# Patient Record
Sex: FEMALE | Race: WHITE | NOT HISPANIC OR LATINO | Employment: OTHER | URBAN - METROPOLITAN AREA
[De-identification: names, ages, dates, MRNs, and addresses within clinical notes are randomized per-mention and may not be internally consistent; named-entity substitution may affect disease eponyms.]

---

## 2017-12-19 PROBLEM — N93.8 OTHER SPECIFIED ABNORMAL UTERINE AND VAGINAL BLEEDING: Status: ACTIVE | Noted: 2017-12-19

## 2018-07-03 PROBLEM — I10 ESSENTIAL HYPERTENSION: Status: ACTIVE | Noted: 2018-07-03

## 2021-03-29 PROBLEM — J45.30 MILD PERSISTENT ASTHMA WITHOUT COMPLICATION: Chronic | Status: ACTIVE | Noted: 2021-03-29

## 2021-03-29 PROBLEM — E11.9 DIABETES MELLITUS (HCC): Chronic | Status: ACTIVE | Noted: 2021-03-29

## 2021-03-29 PROBLEM — F32.A DEPRESSION: Chronic | Status: ACTIVE | Noted: 2021-03-29

## 2021-03-29 PROBLEM — G45.9 TIA DUE TO EMBOLISM (HCC): Status: ACTIVE | Noted: 2021-03-29

## 2021-11-16 PROBLEM — E11.9 DIABETES MELLITUS (HCC): Chronic | Status: RESOLVED | Noted: 2021-03-29 | Resolved: 2021-11-16

## 2022-09-21 ENCOUNTER — OFFICE VISIT (OUTPATIENT)
Dept: URGENT CARE | Facility: CLINIC | Age: 52
End: 2022-09-21
Payer: COMMERCIAL

## 2022-09-21 ENCOUNTER — TELEPHONE (OUTPATIENT)
Dept: FAMILY MEDICINE CLINIC | Facility: CLINIC | Age: 52
End: 2022-09-21

## 2022-09-21 ENCOUNTER — HOSPITAL ENCOUNTER (EMERGENCY)
Facility: HOSPITAL | Age: 52
Discharge: HOME | End: 2022-09-21
Attending: EMERGENCY MEDICINE
Payer: COMMERCIAL

## 2022-09-21 ENCOUNTER — APPOINTMENT (EMERGENCY)
Dept: CT IMAGING | Facility: HOSPITAL | Age: 52
End: 2022-09-21
Payer: COMMERCIAL

## 2022-09-21 VITALS
OXYGEN SATURATION: 99 % | WEIGHT: 293 LBS | RESPIRATION RATE: 18 BRPM | SYSTOLIC BLOOD PRESSURE: 123 MMHG | TEMPERATURE: 97.8 F | BODY MASS INDEX: 47.09 KG/M2 | HEART RATE: 97 BPM | HEIGHT: 66 IN | DIASTOLIC BLOOD PRESSURE: 62 MMHG

## 2022-09-21 VITALS
HEART RATE: 83 BPM | TEMPERATURE: 98.6 F | RESPIRATION RATE: 18 BRPM | DIASTOLIC BLOOD PRESSURE: 80 MMHG | OXYGEN SATURATION: 99 % | SYSTOLIC BLOOD PRESSURE: 146 MMHG

## 2022-09-21 DIAGNOSIS — R11.0 NAUSEA: Primary | ICD-10-CM

## 2022-09-21 DIAGNOSIS — R19.7 DIARRHEA, UNSPECIFIED TYPE: ICD-10-CM

## 2022-09-21 DIAGNOSIS — R19.7 DIARRHEA: Primary | ICD-10-CM

## 2022-09-21 DIAGNOSIS — K21.9 GASTROESOPHAGEAL REFLUX DISEASE WITHOUT ESOPHAGITIS: Chronic | ICD-10-CM

## 2022-09-21 DIAGNOSIS — R14.2 BURPING: ICD-10-CM

## 2022-09-21 LAB
ALBUMIN SERPL BCP-MCNC: 4.2 G/DL (ref 3.5–5)
ALP SERPL-CCNC: 63 U/L (ref 34–104)
ALT SERPL W P-5'-P-CCNC: 11 U/L (ref 7–52)
ANION GAP SERPL CALCULATED.3IONS-SCNC: 9 MMOL/L (ref 4–13)
AST SERPL W P-5'-P-CCNC: 14 U/L (ref 13–39)
BASOPHILS # BLD AUTO: 0.01 THOUSANDS/ΜL (ref 0–0.1)
BASOPHILS NFR BLD AUTO: 0 % (ref 0–1)
BILIRUB SERPL-MCNC: 0.53 MG/DL (ref 0.2–1)
BUN SERPL-MCNC: 10 MG/DL (ref 5–25)
CALCIUM SERPL-MCNC: 9.1 MG/DL (ref 8.4–10.2)
CHLORIDE SERPL-SCNC: 105 MMOL/L (ref 96–108)
CO2 SERPL-SCNC: 23 MMOL/L (ref 21–32)
CREAT SERPL-MCNC: 1.09 MG/DL (ref 0.6–1.3)
EOSINOPHIL # BLD AUTO: 0.36 THOUSAND/ΜL (ref 0–0.61)
EOSINOPHIL NFR BLD AUTO: 6 % (ref 0–6)
ERYTHROCYTE [DISTWIDTH] IN BLOOD BY AUTOMATED COUNT: 13.1 % (ref 11.6–15.1)
GFR SERPL CREATININE-BSD FRML MDRD: 59 ML/MIN/1.73SQ M
GLUCOSE SERPL-MCNC: 114 MG/DL (ref 65–140)
HCT VFR BLD AUTO: 49.3 % (ref 34.8–46.1)
HGB BLD-MCNC: 15.6 G/DL (ref 11.5–15.4)
IMM GRANULOCYTES # BLD AUTO: 0.01 THOUSAND/UL (ref 0–0.2)
IMM GRANULOCYTES NFR BLD AUTO: 0 % (ref 0–2)
LIPASE SERPL-CCNC: 18 U/L (ref 11–82)
LYMPHOCYTES # BLD AUTO: 0.9 THOUSANDS/ΜL (ref 0.6–4.47)
LYMPHOCYTES NFR BLD AUTO: 16 % (ref 14–44)
MCH RBC QN AUTO: 28.5 PG (ref 26.8–34.3)
MCHC RBC AUTO-ENTMCNC: 31.6 G/DL (ref 31.4–37.4)
MCV RBC AUTO: 90 FL (ref 82–98)
MONOCYTES # BLD AUTO: 0.76 THOUSAND/ΜL (ref 0.17–1.22)
MONOCYTES NFR BLD AUTO: 13 % (ref 4–12)
NEUTROPHILS # BLD AUTO: 3.68 THOUSANDS/ΜL (ref 1.85–7.62)
NEUTS SEG NFR BLD AUTO: 65 % (ref 43–75)
NRBC BLD AUTO-RTO: 0 /100 WBCS
PLATELET # BLD AUTO: 161 THOUSANDS/UL (ref 149–390)
PMV BLD AUTO: 9.6 FL (ref 8.9–12.7)
POTASSIUM SERPL-SCNC: 3.7 MMOL/L (ref 3.5–5.3)
PROT SERPL-MCNC: 7.3 G/DL (ref 6.4–8.4)
RBC # BLD AUTO: 5.48 MILLION/UL (ref 3.81–5.12)
SODIUM SERPL-SCNC: 137 MMOL/L (ref 135–147)
WBC # BLD AUTO: 5.72 THOUSAND/UL (ref 4.31–10.16)

## 2022-09-21 PROCEDURE — 99284 EMERGENCY DEPT VISIT MOD MDM: CPT

## 2022-09-21 PROCEDURE — 83690 ASSAY OF LIPASE: CPT

## 2022-09-21 PROCEDURE — S9088 SERVICES PROVIDED IN URGENT: HCPCS | Performed by: PHYSICIAN ASSISTANT

## 2022-09-21 PROCEDURE — 85025 COMPLETE CBC W/AUTO DIFF WBC: CPT

## 2022-09-21 PROCEDURE — 80053 COMPREHEN METABOLIC PANEL: CPT

## 2022-09-21 PROCEDURE — 36415 COLL VENOUS BLD VENIPUNCTURE: CPT

## 2022-09-21 PROCEDURE — 99212 OFFICE O/P EST SF 10 MIN: CPT | Performed by: PHYSICIAN ASSISTANT

## 2022-09-21 PROCEDURE — 74177 CT ABD & PELVIS W/CONTRAST: CPT

## 2022-09-21 PROCEDURE — 96374 THER/PROPH/DIAG INJ IV PUSH: CPT

## 2022-09-21 RX ORDER — ONDANSETRON 4 MG/1
4 TABLET, ORALLY DISINTEGRATING ORAL EVERY 6 HOURS PRN
Qty: 20 TABLET | Refills: 0 | Status: SHIPPED | OUTPATIENT
Start: 2022-09-21

## 2022-09-21 RX ORDER — ONDANSETRON 2 MG/ML
4 INJECTION INTRAMUSCULAR; INTRAVENOUS ONCE
Status: COMPLETED | OUTPATIENT
Start: 2022-09-21 | End: 2022-09-21

## 2022-09-21 RX ADMIN — IOHEXOL 100 ML: 350 INJECTION, SOLUTION INTRAVENOUS at 20:14

## 2022-09-21 RX ADMIN — SODIUM CHLORIDE 1000 ML: 0.9 INJECTION, SOLUTION INTRAVENOUS at 19:45

## 2022-09-21 RX ADMIN — ONDANSETRON 4 MG: 2 INJECTION INTRAMUSCULAR; INTRAVENOUS at 19:25
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
## 2022-09-22 DIAGNOSIS — E11.65 TYPE 2 DIABETES MELLITUS WITH HYPERGLYCEMIA, WITH LONG-TERM CURRENT USE OF INSULIN (HCC): ICD-10-CM

## 2022-09-22 DIAGNOSIS — Z79.4 TYPE 2 DIABETES MELLITUS WITH HYPERGLYCEMIA, WITH LONG-TERM CURRENT USE OF INSULIN (HCC): ICD-10-CM

## 2022-09-22 RX ORDER — INSULIN PUMP CONTROLLER
1 EACH MISCELLANEOUS
Qty: 45 EACH | Refills: 1 | Status: SHIPPED | OUTPATIENT
Start: 2022-09-22

## 2022-10-07 DIAGNOSIS — Z79.4 TYPE 2 DIABETES MELLITUS WITH HYPERGLYCEMIA, WITH LONG-TERM CURRENT USE OF INSULIN (HCC): ICD-10-CM

## 2022-10-07 DIAGNOSIS — E11.65 TYPE 2 DIABETES MELLITUS WITH HYPERGLYCEMIA, WITH LONG-TERM CURRENT USE OF INSULIN (HCC): ICD-10-CM

## 2022-10-07 RX ORDER — EMPAGLIFLOZIN 25 MG/1
TABLET, FILM COATED ORAL
Qty: 30 TABLET | Refills: 2 | Status: SHIPPED | OUTPATIENT
Start: 2022-10-07

## 2022-10-11 NOTE — TELEPHONE ENCOUNTER
Called and left message with patient requesting a call back 
Jean Carlos Pop called and stated she has had Diarrhea for the past 4 days and not stopping  She is eating, but using the Brat Diet  She is burping a lot also  She is having a hard time keeping up her sugar and is increasing her intake of insulin    She is not taking OTC diarrhea medication    Please advise    Phone: [phone]
Patient called back was going to go over to Urgent care
Please call patient a verify what dose of insulin she has been taking  She should not be taking more insulin if she cannot keep her sugars up  Has she been hypoglycemic? What are her sugars? How many episodes of diarrhea per day?
She should continue to monitor sugars and hold insulin when low  Continues to increase fluids  Can take imodium as needed  Sounds like she might have gastroenteritis  Call the office if no improvement 
Spoke with Jorge Baig ( miss understood question on the first message about her insulin)  and she stated that she decreased the insulin  She is on a pump and just suspenses pump for a while  Has diarrhea to many time to count  Every thing she is eating is coming back out    Her sugers are running 70 to 150   And when the one time it was running low (60) is when she started to suspend the insulin    No belly pain, and constant burping   Drinking water so she is not dehydrated
Alcohol dependence with withdrawal

## 2022-10-12 ENCOUNTER — TELEPHONE (OUTPATIENT)
Dept: ENDOCRINOLOGY | Facility: CLINIC | Age: 52
End: 2022-10-12

## 2022-10-12 NOTE — TELEPHONE ENCOUNTER
Faxed over to [facility] patients Jacky BENITO paperwork to [phone]

## 2022-10-26 DIAGNOSIS — J45.30 MILD PERSISTENT ASTHMA WITHOUT COMPLICATION: Chronic | ICD-10-CM

## 2022-10-26 RX ORDER — ALBUTEROL SULFATE 90 UG/1
AEROSOL, METERED RESPIRATORY (INHALATION)
Qty: 18 G | Refills: 2 | Status: SHIPPED | OUTPATIENT
Start: 2022-10-26

## 2022-11-07 ENCOUNTER — TELEPHONE (OUTPATIENT)
Dept: ENDOCRINOLOGY | Facility: CLINIC | Age: 52
End: 2022-11-07

## 2022-11-07 DIAGNOSIS — E11.65 TYPE 2 DIABETES MELLITUS WITH HYPERGLYCEMIA, WITH LONG-TERM CURRENT USE OF INSULIN (HCC): Primary | ICD-10-CM

## 2022-11-07 DIAGNOSIS — Z79.4 TYPE 2 DIABETES MELLITUS WITH HYPERGLYCEMIA, WITH LONG-TERM CURRENT USE OF INSULIN (HCC): Primary | ICD-10-CM

## 2022-11-07 RX ORDER — BLOOD SUGAR DIAGNOSTIC
STRIP MISCELLANEOUS
Qty: 120 STRIP | Refills: 3 | Status: SHIPPED | OUTPATIENT
Start: 2022-11-07

## 2022-11-28 DIAGNOSIS — Z79.4 TYPE 2 DIABETES MELLITUS WITHOUT COMPLICATION, WITH LONG-TERM CURRENT USE OF INSULIN (HCC): ICD-10-CM

## 2022-11-28 DIAGNOSIS — E11.9 TYPE 2 DIABETES MELLITUS WITHOUT COMPLICATION, WITH LONG-TERM CURRENT USE OF INSULIN (HCC): ICD-10-CM

## 2022-11-28 DIAGNOSIS — E66.01 MORBID OBESITY (HCC): ICD-10-CM

## 2022-11-28 RX ORDER — SEMAGLUTIDE 1.34 MG/ML
INJECTION, SOLUTION SUBCUTANEOUS
Qty: 9 ML | Refills: 1 | Status: SHIPPED | OUTPATIENT
Start: 2022-11-28 | End: 2022-11-29 | Stop reason: SDUPTHER

## 2022-11-28 NOTE — TELEPHONE ENCOUNTER
Patient stated she went to  [facility] where they only gave her one pen instead of 3  Pharmacy is going to look back at the cameras and determine if they only gave her one pen or not

## 2022-11-29 DIAGNOSIS — E66.01 MORBID OBESITY (HCC): ICD-10-CM

## 2022-11-29 DIAGNOSIS — Z79.4 TYPE 2 DIABETES MELLITUS WITHOUT COMPLICATION, WITH LONG-TERM CURRENT USE OF INSULIN (HCC): ICD-10-CM

## 2022-11-29 DIAGNOSIS — E11.9 TYPE 2 DIABETES MELLITUS WITHOUT COMPLICATION, WITH LONG-TERM CURRENT USE OF INSULIN (HCC): ICD-10-CM

## 2022-11-29 RX ORDER — SEMAGLUTIDE 1.34 MG/ML
INJECTION, SOLUTION SUBCUTANEOUS
Qty: 9 ML | Refills: 1 | Status: SHIPPED | OUTPATIENT
Start: 2022-11-29

## 2022-11-29 NOTE — TELEPHONE ENCOUNTER
[facility] did not have patients ozempic in stock patient requested to have records transferred to [facility] from now on

## 2022-12-07 ENCOUNTER — HOSPITAL ENCOUNTER (OUTPATIENT)
Dept: MAMMOGRAPHY | Facility: HOSPITAL | Age: 52
Discharge: HOME | End: 2022-12-07

## 2022-12-07 VITALS — WEIGHT: 293 LBS | HEIGHT: 66 IN | BODY MASS INDEX: 47.09 KG/M2

## 2022-12-07 DIAGNOSIS — Z12.31 ENCOUNTER FOR SCREENING MAMMOGRAM FOR BREAST CANCER: ICD-10-CM

## 2022-12-07 DIAGNOSIS — Z01.419 ROUTINE GYNECOLOGICAL EXAMINATION: ICD-10-CM

## 2022-12-15 DIAGNOSIS — R10.10 PAIN OF UPPER ABDOMEN: ICD-10-CM

## 2022-12-15 RX ORDER — OMEPRAZOLE 40 MG/1
40 CAPSULE, DELAYED RELEASE ORAL DAILY
Qty: 30 CAPSULE | Refills: 1 | Status: SHIPPED | OUTPATIENT
Start: 2022-12-15

## 2022-12-15 NOTE — TELEPHONE ENCOUNTER
Patient requesting refill(s) of: Prilosec    Last filled: 10/21/22 #30x1  Last appt: 6/22/22  Next appt: none  Pharmacy: [facility]

## 2022-12-21 ENCOUNTER — APPOINTMENT (OUTPATIENT)
Dept: LAB | Facility: CLINIC | Age: 52
End: 2022-12-21

## 2022-12-21 ENCOUNTER — OFFICE VISIT (OUTPATIENT)
Dept: FAMILY MEDICINE CLINIC | Facility: CLINIC | Age: 52
End: 2022-12-21

## 2022-12-21 VITALS
SYSTOLIC BLOOD PRESSURE: 102 MMHG | DIASTOLIC BLOOD PRESSURE: 62 MMHG | TEMPERATURE: 96.9 F | BODY MASS INDEX: 47.09 KG/M2 | HEART RATE: 82 BPM | HEIGHT: 66 IN | RESPIRATION RATE: 18 BRPM | WEIGHT: 293 LBS | OXYGEN SATURATION: 99 %

## 2022-12-21 DIAGNOSIS — G47.09 OTHER INSOMNIA: Primary | ICD-10-CM

## 2022-12-21 DIAGNOSIS — F41.1 GAD (GENERALIZED ANXIETY DISORDER): ICD-10-CM

## 2022-12-21 DIAGNOSIS — Z79.4 TYPE 2 DIABETES MELLITUS WITH HYPERGLYCEMIA, WITH LONG-TERM CURRENT USE OF INSULIN (HCC): ICD-10-CM

## 2022-12-21 DIAGNOSIS — E11.65 TYPE 2 DIABETES MELLITUS WITH HYPERGLYCEMIA, WITH LONG-TERM CURRENT USE OF INSULIN (HCC): ICD-10-CM

## 2022-12-21 LAB
ALBUMIN SERPL BCP-MCNC: 3.6 G/DL (ref 3.5–5)
ALP SERPL-CCNC: 64 U/L (ref 46–116)
ALT SERPL W P-5'-P-CCNC: 18 U/L (ref 12–78)
ANION GAP SERPL CALCULATED.3IONS-SCNC: 3 MMOL/L (ref 4–13)
AST SERPL W P-5'-P-CCNC: 14 U/L (ref 5–45)
BILIRUB SERPL-MCNC: 0.69 MG/DL (ref 0.2–1)
BUN SERPL-MCNC: 18 MG/DL (ref 5–25)
CALCIUM SERPL-MCNC: 9.2 MG/DL (ref 8.3–10.1)
CHLORIDE SERPL-SCNC: 110 MMOL/L (ref 96–108)
CO2 SERPL-SCNC: 26 MMOL/L (ref 21–32)
CREAT SERPL-MCNC: 0.9 MG/DL (ref 0.6–1.3)
EST. AVERAGE GLUCOSE BLD GHB EST-MCNC: 126 MG/DL
GFR SERPL CREATININE-BSD FRML MDRD: 74 ML/MIN/1.73SQ M
GLUCOSE P FAST SERPL-MCNC: 106 MG/DL (ref 65–99)
HBA1C MFR BLD: 6 %
POTASSIUM SERPL-SCNC: 3.9 MMOL/L (ref 3.5–5.3)
PROT SERPL-MCNC: 7.2 G/DL (ref 6.4–8.4)
SODIUM SERPL-SCNC: 139 MMOL/L (ref 135–147)

## 2022-12-21 RX ORDER — BUSPIRONE HYDROCHLORIDE 5 MG/1
5 TABLET ORAL 3 TIMES DAILY
Qty: 90 TABLET | Refills: 1 | Status: SHIPPED | OUTPATIENT
Start: 2022-12-21

## 2022-12-21 RX ORDER — TRAZODONE HYDROCHLORIDE 50 MG/1
50 TABLET ORAL
Qty: 30 TABLET | Refills: 1 | Status: SHIPPED | OUTPATIENT
Start: 2022-12-21

## 2022-12-21 RX ORDER — DIPHENOXYLATE HYDROCHLORIDE AND ATROPINE SULFATE 2.5; .025 MG/1; MG/1
1 TABLET ORAL DAILY
COMMUNITY

## 2022-12-21 NOTE — PATIENT INSTRUCTIONS
Tips for Healthy Sleep   AMBULATORY CARE:   What you need to know about healthy sleep:  Healthy sleep, or sleep hygiene, is important to your physical, mental, and emotional health  Sleep affects almost every part of your body, including your brain, heart, metabolism, immune system, and mood  Good sleep habits can help you fall asleep and stay asleep during the night  Poor quality sleep or a long-term lack of sleep increases your risk for certain disorders  These include high blood pressure, cardiovascular disease, obesity, depression, and diabetes  What you need to know about sleep stages: The 2 main kinds of sleep are rapid eye movement (REM) and non-REM  You cycle through REM and non-REM many times during the night  Stage 1 non-REM sleep  is when you first fall asleep  This stage is short  Your brain waves slow and your body relaxes  Stage 2 non-REM sleep  is a period of light sleep before you move into deeper sleep  You spend the most time in this stage during the night  Your body temperature drops and your body relaxes even more  Stage 3 non-REM sleep  is a period of deep sleep that starts after stage 2  This stage is needed to feel refreshed in the morning  REM sleep  starts about 90 minutes after you fall asleep  Your eyes move from side to side  Your heart rate, blood pressure, and breathing become faster  Most of your dreams occur during REM sleep  As you age, you spend less time in REM sleep  How much sleep you need:  Each person needs a different amount of sleep  Your sleep patterns and needs change as you age  In general, school-aged children and teenagers need about 9 to 10 hours of sleep a night  Most adults need about 7 to 9 hours of sleep a night  After age 61 years, sleep may be lighter and for less time, with more periods of wakefulness  Older adults may fall asleep and wake up earlier than they did at a younger age   Medicines or conditions, such as chronic pain, may keep older adults awake  How to know you are getting healthy sleep:   Keep a 2-week log of your sleep  Write down what time you got up, what you did that day, and anything else that could affect your sleep  Keep a record of your sleep patterns, and any sleeping problems you have  Bring the record to your follow-up visits  Ask someone who lives with you if they notice anything about your sleep  For example, maybe you snore loudly or stop breathing for short periods  Tell your healthcare provider if your legs twitch or you feel like you can't keep them still  Your healthcare provider may refer to you a sleep specialist or cognitive behavioral therapy  Call your doctor if:   Someone has told you that you stop breathing when you sleep  Your legs twitch and it keeps you from sleeping  You begin to use drugs or alcohol to fall asleep  You have questions or concerns about your sleep habits  How to improve your sleep:  Your daily routines influence your sleep  Nutrition, medicines, your schedule, and what you do in the evenings can affect your sleep  Do the following to help improve your sleep:  Create a sleep schedule  Go to sleep and wake up at the same time every day  Be consistent, even on weekends or when you travel  Do not go to bed unless you are sleepy  Set up a calming routine before bed  Soak in a warm bath, listen to relaxing music, or read a book  Turn off all electronics at least 30 minutes before bedtime  Try not to watch television or use any electronics in the bedroom  Limit naps to 20 or 30 minutes, earlier in the day  Naps could make it hard for you to fall asleep at bedtime  Keep your bedroom cool, quiet, and dark  Turn on white noise, such as a fan, to help you relax  Get up if you do not fall asleep within 20 minutes  Move to another room and do something relaxing until you become sleepy  Limit caffeine, alcohol, and food to earlier in the day    Only drink caffeine in the morning  Do not drink alcohol within 6 hours of bedtime  Do not eat a heavy meal right before you go to bed  Limit how much liquid you drink in the evenings and right before bed  If you are prescribed diuretics, or water pills, take them early in the day  Exercise regularly  Daily exercise may help you sleep better  Do not exercise within 3 hours of bedtime  Follow up with your doctor as directed:  Bring your sleep log with you  Write down your questions so you remember to ask them during your visits  © Copyright [employer] 2022 Information is for End User's use only and may not be sold, redistributed or otherwise used for commercial purposes  All illustrations and images included in CareNotes® are the copyrighted property of [employer]  or [facility] Bethea   The above information is an  only  It is not intended as medical advice for individual conditions or treatments  Talk to your doctor, nurse or pharmacist before following any medical regimen to see if it is safe and effective for you
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
## 2022-12-22 ENCOUNTER — TELEPHONE (OUTPATIENT)
Dept: ENDOCRINOLOGY | Facility: CLINIC | Age: 52
End: 2022-12-22

## 2022-12-22 ENCOUNTER — OFFICE VISIT (OUTPATIENT)
Dept: ENDOCRINOLOGY | Facility: CLINIC | Age: 52
End: 2022-12-22

## 2022-12-22 VITALS
HEIGHT: 66 IN | HEART RATE: 82 BPM | DIASTOLIC BLOOD PRESSURE: 68 MMHG | BODY MASS INDEX: 47.09 KG/M2 | SYSTOLIC BLOOD PRESSURE: 118 MMHG | WEIGHT: 293 LBS | OXYGEN SATURATION: 98 %

## 2022-12-22 DIAGNOSIS — Z79.4 TYPE 2 DIABETES MELLITUS WITH HYPERGLYCEMIA, WITH LONG-TERM CURRENT USE OF INSULIN (HCC): Primary | ICD-10-CM

## 2022-12-22 DIAGNOSIS — E66.01 MORBID OBESITY (HCC): ICD-10-CM

## 2022-12-22 DIAGNOSIS — E11.65 TYPE 2 DIABETES MELLITUS WITH HYPERGLYCEMIA, WITH LONG-TERM CURRENT USE OF INSULIN (HCC): ICD-10-CM

## 2022-12-22 DIAGNOSIS — G47.33 OSA (OBSTRUCTIVE SLEEP APNEA): ICD-10-CM

## 2022-12-22 DIAGNOSIS — Z79.4 TYPE 2 DIABETES MELLITUS WITH HYPERGLYCEMIA, WITH LONG-TERM CURRENT USE OF INSULIN (HCC): ICD-10-CM

## 2022-12-22 DIAGNOSIS — I10 ESSENTIAL HYPERTENSION: ICD-10-CM

## 2022-12-22 DIAGNOSIS — E11.65 TYPE 2 DIABETES MELLITUS WITH HYPERGLYCEMIA, WITH LONG-TERM CURRENT USE OF INSULIN (HCC): Primary | ICD-10-CM

## 2022-12-22 RX ORDER — INSULIN PUMP CONTROLLER
1 EACH MISCELLANEOUS
Qty: 45 EACH | Refills: 1 | Status: SHIPPED | OUTPATIENT
Start: 2022-12-22 | End: 2022-12-22 | Stop reason: SDUPTHER

## 2022-12-22 RX ORDER — INSULIN PUMP CONTROLLER
1 EACH MISCELLANEOUS
Qty: 45 EACH | Refills: 1 | Status: SHIPPED | OUTPATIENT
Start: 2022-12-22
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
## 2022-12-22 NOTE — TELEPHONE ENCOUNTER
Pt is unable to get the Gen 4 Omni Pods at [facility]  They only carry the 5 Gen 6  She would like to have the Gen 4's sent to the [address] [place] [address] for now until she finds out if she is getting the gen 5

## 2022-12-22 NOTE — ASSESSMENT & PLAN NOTE
Patient is very well controlled at this time  While I do not prefer patients to be on such a high percent of basal insulin, patient is doing well on current regimen and she will not bolus more frequently as she does not eat frequently  Counseled again on the higher risk of hypoglycemia given her inconsistent eating patterns  Also counseled on the importance of checking her blood sugar with a finger stick if her CGM alerts her to hypoglycemia but she does not trust the reading/is asymptomatic  Patient knows to notify me with persistent hyperglycemia or episodes of hypoglycemia  F/U in 4 months     Lab Results   Component Value Date    HGBA1C 6 0 (H) 12/21/2022

## 2023-01-04 ENCOUNTER — RA CDI HCC (OUTPATIENT)
Dept: OTHER | Facility: HOSPITAL | Age: 53
End: 2023-01-04

## 2023-01-04 NOTE — PROGRESS NOTES
[facility]  coding opportunities       Chart reviewed, no opportunity found:   [address]        Patients Insurance     Medicare Insurance: [employer]

## 2023-01-11 ENCOUNTER — TELEPHONE (OUTPATIENT)
Dept: FAMILY MEDICINE CLINIC | Facility: CLINIC | Age: 53
End: 2023-01-11

## 2023-01-20 ENCOUNTER — OFFICE VISIT (OUTPATIENT)
Dept: FAMILY MEDICINE CLINIC | Facility: CLINIC | Age: 53
End: 2023-01-20

## 2023-01-20 VITALS
DIASTOLIC BLOOD PRESSURE: 82 MMHG | WEIGHT: 293 LBS | BODY MASS INDEX: 47.09 KG/M2 | HEART RATE: 80 BPM | OXYGEN SATURATION: 97 % | SYSTOLIC BLOOD PRESSURE: 134 MMHG | TEMPERATURE: 98.7 F | HEIGHT: 66 IN

## 2023-01-20 DIAGNOSIS — Z12.11 SCREENING FOR COLON CANCER: Primary | ICD-10-CM

## 2023-01-20 DIAGNOSIS — F41.1 GAD (GENERALIZED ANXIETY DISORDER): ICD-10-CM

## 2023-01-20 DIAGNOSIS — Z00.00 MEDICARE ANNUAL WELLNESS VISIT, SUBSEQUENT: ICD-10-CM

## 2023-01-20 DIAGNOSIS — C69.42 MALIGNANT MELANOMA OF UVEA OF LEFT EYE (HCC): ICD-10-CM

## 2023-01-20 DIAGNOSIS — E66.01 MORBID OBESITY (HCC): ICD-10-CM

## 2023-01-20 DIAGNOSIS — F51.01 PRIMARY INSOMNIA: ICD-10-CM

## 2023-01-20 DIAGNOSIS — E11.65 TYPE 2 DIABETES MELLITUS WITH HYPERGLYCEMIA, WITH LONG-TERM CURRENT USE OF INSULIN (HCC): ICD-10-CM

## 2023-01-20 DIAGNOSIS — E11.9 ENCOUNTER FOR DIABETIC FOOT EXAM (HCC): ICD-10-CM

## 2023-01-20 DIAGNOSIS — Z79.4 TYPE 2 DIABETES MELLITUS WITH HYPERGLYCEMIA, WITH LONG-TERM CURRENT USE OF INSULIN (HCC): ICD-10-CM

## 2023-01-20 RX ORDER — DOXEPIN HYDROCHLORIDE 10 MG/1
10 CAPSULE ORAL
Qty: 30 CAPSULE | Refills: 1 | Status: SHIPPED | OUTPATIENT
Start: 2023-01-20

## 2023-01-20 RX ORDER — BUSPIRONE HYDROCHLORIDE 10 MG/1
10 TABLET ORAL 2 TIMES DAILY
Qty: 60 TABLET | Refills: 3 | Status: SHIPPED | OUTPATIENT
Start: 2023-01-20
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
## 2023-01-20 NOTE — PATIENT INSTRUCTIONS
Medicare Preventive Visit Patient Instructions  Thank you for completing your Welcome to Medicare Visit or Medicare Annual Wellness Visit today  Your next wellness visit will be due in one year (1/21/2024)  The screening/preventive services that you may require over the next 5-10 years are detailed below  Some tests may not apply to you based off risk factors and/or age  Screening tests ordered at today's visit but not completed yet may show as past due  Also, please note that scanned in results may not display below  Preventive Screenings:  Service Recommendations Previous Testing/Comments   Colorectal Cancer Screening  * Colonoscopy    * Fecal Occult Blood Test (FOBT)/Fecal Immunochemical Test (FIT)  * Fecal DNA/Cologuard Test  * Flexible Sigmoidoscopy Age: 39-70 years old   Colonoscopy: every 10 years (may be performed more frequently if at higher risk)  OR  FOBT/FIT: every 1 year  OR  Cologuard: every 3 years  OR  Sigmoidoscopy: every 5 years  Screening may be recommended earlier than age 39 if at higher risk for colorectal cancer  Also, an individualized decision between you and your healthcare provider will decide whether screening between the ages of 74-80 would be appropriate  Colonoscopy: Not on file  FOBT/FIT: Not on file  Cologuard: Not on file  Sigmoidoscopy: Not on file          Breast Cancer Screening Age: 36 years old  Frequency: every 1-2 years  Not required if history of left and right mastectomy Mammogram: 12/07/2022    Screening Current   Cervical Cancer Screening Between the ages of 21-29, pap smear recommended once every 3 years  Between the ages of 33-67, can perform pap smear with HPV co-testing every 5 years     Recommendations may differ for women with a history of total hysterectomy, cervical cancer, or abnormal pap smears in past  Pap Smear: 05/10/2022    Screening Current   Hepatitis C Screening Once for adults born between 1945 and 1965  More frequently in patients at high risk for Hepatitis C Hep C Antibody: Not on file        Diabetes Screening 1-2 times per year if you're at risk for diabetes or have pre-diabetes Fasting glucose: 106 mg/dL (12/21/2022)  A1C: 6 0 % (12/21/2022)  Screening Not Indicated  History Diabetes   Cholesterol Screening Once every 5 years if you don't have a lipid disorder  May order more often based on risk factors  Lipid panel: 09/01/2022    Screening Not Indicated  History Lipid Disorder     Other Preventive Screenings Covered by Medicare:  1  Abdominal Aortic Aneurysm (AAA) Screening: covered once if your at risk  You're considered to be at risk if you have a family history of AAA  2  Lung Cancer Screening: covers low dose CT scan once per year if you meet all of the following conditions: (1) Age 50-69; (2) No signs or symptoms of lung cancer; (3) Current smoker or have quit smoking within the last 15 years; (4) You have a tobacco smoking history of at least 20 pack years (packs per day multiplied by number of years you smoked); (5) You get a written order from a healthcare provider  3  Glaucoma Screening: covered annually if you're considered high risk: (1) You have diabetes OR (2) Family history of glaucoma OR (3)  aged 48 and older OR (3)  American aged 72 and older  3  Osteoporosis Screening: covered every 2 years if you meet one of the following conditions: (1) You're estrogen deficient and at risk for osteoporosis based off medical history and other findings; (2) Have a vertebral abnormality; (3) On glucocorticoid therapy for more than 3 months; (4) Have primary hyperparathyroidism; (5) On osteoporosis medications and need to assess response to drug therapy  · Last bone density test (DXA Scan): Not on file  5  HIV Screening: covered annually if you're between the age of 12-76  Also covered annually if you are younger than 13 and older than 72 with risk factors for HIV infection   For pregnant patients, it is covered up to 3 times per pregnancy  Immunizations:  Immunization Recommendations   Influenza Vaccine Annual influenza vaccination during flu season is recommended for all persons aged >= 6 months who do not have contraindications   Pneumococcal Vaccine   * Pneumococcal conjugate vaccine = PCV13 (Prevnar 13), PCV15 (Vaxneuvance), PCV20 (Prevnar 20)  * Pneumococcal polysaccharide vaccine = PPSV23 (Pneumovax) Adults 25-60 years old: 1-3 doses may be recommended based on certain risk factors  Adults 72 years old: 1-2 doses may be recommended based off what pneumonia vaccine you previously received   Hepatitis B Vaccine 3 dose series if at intermediate or high risk (ex: diabetes, end stage renal disease, liver disease)   Tetanus (Td) Vaccine - COST NOT COVERED BY MEDICARE PART B Following completion of primary series, a booster dose should be given every 10 years to maintain immunity against tetanus  Td may also be given as tetanus wound prophylaxis  Tdap Vaccine - COST NOT COVERED BY MEDICARE PART B Recommended at least once for all adults  For pregnant patients, recommended with each pregnancy  Shingles Vaccine (Shingrix) - COST NOT COVERED BY MEDICARE PART B  2 shot series recommended in those aged 48 and above     Health Maintenance Due:      Topic Date Due   • Hepatitis C Screening  Never done   • HIV Screening  Never done   • Colorectal Cancer Screening  Never done   • Breast Cancer Screening: Mammogram  12/07/2023   • Cervical Cancer Screening  05/10/2027     Immunizations Due:      Topic Date Due   • COVID-19 Vaccine (1) Never done   • Influenza Vaccine (1) 09/01/2022   • Pneumococcal Vaccine: Pediatrics (0 to 5 Years) and At-Risk Patients (6 to 59 Years) (2 - PCV) 11/16/2022     Advance Directives   What are advance directives? Advance directives are legal documents that state your wishes and plans for medical care  These plans are made ahead of time in case you lose your ability to make decisions for yourself   Advance directives can apply to any medical decision, such as the treatments you want, and if you want to donate organs  What are the types of advance directives? There are many types of advance directives, and each state has rules about how to use them  You may choose a combination of any of the following:  · Living will: This is a written record of the treatment you want  You can also choose which treatments you do not want, which to limit, and which to stop at a certain time  This includes surgery, medicine, IV fluid, and tube feedings  · Durable power of  for healthcare [facility]): This is a written record that states who you want to make healthcare choices for you when you are unable to make them for yourself  This person, called a proxy, is usually a family member or a friend  You may choose more than 1 proxy  · Do not resuscitate (DNR) order:  A DNR order is used in case your heart stops beating or you stop breathing  It is a request not to have certain forms of treatment, such as CPR  A DNR order may be included in other types of advance directives  · Medical directive: This covers the care that you want if you are in a coma, near death, or unable to make decisions for yourself  You can list the treatments you want for each condition  Treatment may include pain medicine, surgery, blood transfusions, dialysis, IV or tube feedings, and a ventilator (breathing machine)  · Values history: This document has questions about your views, beliefs, and how you feel and think about life  This information can help others choose the care that you would choose  Why are advance directives important? An advance directive helps you control your care  Although spoken wishes may be used, it is better to have your wishes written down  Spoken wishes can be misunderstood, or not followed  Treatments may be given even if you do not want them   An advance directive may make it easier for your family to make difficult choices about your care  Cigarette Smoking and Your Health   Risks to your health if you smoke:  Nicotine and other chemicals found in tobacco damage every cell in your body  Even if you are a light smoker, you have an increased risk for cancer, heart disease, and lung disease  If you are pregnant or have diabetes, smoking increases your risk for complications  Benefits to your health if you stop smoking:   · You decrease respiratory symptoms such as coughing, wheezing, and shortness of breath  · You reduce your risk for cancers of the lung, mouth, throat, kidney, bladder, pancreas, stomach, and cervix  If you already have cancer, you increase the benefits of chemotherapy  You also reduce your risk for cancer returning or a second cancer from developing  · You reduce your risk for heart disease, blood clots, heart attack, and stroke  · You reduce your risk for lung infections, and diseases such as pneumonia, asthma, chronic bronchitis, and emphysema  · Your circulation improves  More oxygen can be delivered to your body  If you have diabetes, you lower your risk for complications, such as kidney, artery, and eye diseases  You also lower your risk for nerve damage  Nerve damage can lead to amputations, poor vision, and blindness  · You improve your body's ability to heal and to fight infections  For more information and support to stop smoking:   · [employer]  Phone: 7- 672 - 634-1204  Web Address: www I and love and you  Weight Management   Why it is important to manage your weight:  Being overweight increases your risk of health conditions such as heart disease, high blood pressure, type 2 diabetes, and certain types of cancer  It can also increase your risk for osteoarthritis, sleep apnea, and other respiratory problems  Aim for a slow, steady weight loss  Even a small amount of weight loss can lower your risk of health problems    How to lose weight safely:  A safe and healthy way to lose weight is to eat fewer calories and get regular exercise  You can lose up about 1 pound a week by decreasing the number of calories you eat by 500 calories each day  Healthy meal plan for weight management:  A healthy meal plan includes a variety of foods, contains fewer calories, and helps you stay healthy  A healthy meal plan includes the following:  · Eat whole-grain foods more often  A healthy meal plan should contain fiber  Fiber is the part of grains, fruits, and vegetables that is not broken down by your body  Whole-grain foods are healthy and provide extra fiber in your diet  Some examples of whole-grain foods are whole-wheat breads and pastas, oatmeal, brown rice, and bulgur  · Eat a variety of vegetables every day  Include dark, leafy greens such as spinach, kale, xochitl greens, and mustard greens  Eat yellow and orange vegetables such as carrots, sweet potatoes, and winter squash  · Eat a variety of fruits every day  Choose fresh or canned fruit (canned in its own juice or light syrup) instead of juice  Fruit juice has very little or no fiber  · Eat low-fat dairy foods  Drink fat-free (skim) milk or 1% milk  Eat fat-free yogurt and low-fat cottage cheese  Try low-fat cheeses such as mozzarella and other reduced-fat cheeses  · Choose meat and other protein foods that are low in fat  Choose beans or other legumes such as split peas or lentils  Choose fish, skinless poultry (chicken or turkey), or lean cuts of red meat (beef or pork)  Before you cook meat or poultry, cut off any visible fat  · Use less fat and oil  Try baking foods instead of frying them  Add less fat, such as margarine, sour cream, regular salad dressing and mayonnaise to foods  Eat fewer high-fat foods  Some examples of high-fat foods include french fries, doughnuts, ice cream, and cakes  · Eat fewer sweets  Limit foods and drinks that are high in sugar  This includes candy, cookies, regular soda, and sweetened drinks    Exercise: Exercise at least 30 minutes per day on most days of the week  Some examples of exercise include walking, biking, dancing, and swimming  You can also fit in more physical activity by taking the stairs instead of the elevator or parking farther away from stores  Ask your healthcare provider about the best exercise plan for you  © Copyright [employer] 2018 Information is for End User's use only and may not be sold, redistributed or otherwise used for commercial purposes  All illustrations and images included in CareNotes® are the copyrighted property of A D A M , Inc  or Mikey Bethea     Type 2 Diabetes Management for Adults   AMBULATORY CARE:   Type 2 diabetes  is a disease that affects how your body uses glucose (sugar)  Either your body cannot make enough insulin, or it cannot use the insulin correctly  It is important to keep diabetes controlled to prevent damage to your heart, blood vessels, and other organs  Have someone call your local emergency number (911 in the [address] [facility]) if:   · You cannot be woken  · You have signs of diabetic ketoacidosis:     ? confusion, fatigue    ? vomiting    ? rapid heartbeat    ? fruity smelling breath    ? extreme thirst    ? dry mouth and skin    · You have any of the following signs of a heart attack:      ? Squeezing, pressure, or pain in your chest    ? You may  also have any of the following:     § Discomfort or pain in your back, neck, jaw, stomach, or arm    § Shortness of breath    § Nausea or vomiting    § Lightheadedness or a sudden cold sweat    · You have any of the following signs of a stroke:      ? Numbness or drooping on one side of your face     ? Weakness in an arm or leg    ? Confusion or difficulty speaking    ? Dizziness, a severe headache, or vision loss    Call your doctor or diabetes care team if:   · You have a sore or wound that will not heal     · You have a change in the amount you urinate      · Your blood sugar levels are higher than your target goals  · You often have lower blood sugar levels than your target goals  · Your skin is red, dry, warm, or swollen  · You have trouble coping with diabetes, or you feel anxious or depressed  · You have questions or concerns about your condition or care  What you need to know about high blood sugar levels:  High blood sugar levels may not cause any symptoms  You may feel more thirsty or urinate more often than usual  Over time, high blood sugar levels can damage your nerves, blood vessels, tissues, and organs  The following can increase your blood sugar levels:  · Large meals or large amounts of carbohydrates at one time    · Less physical activity    · Stress    · Illness    · A lower dose of medicine or insulin, or a late dose    What you need to know about low blood sugar levels: You can prevent symptoms such as shakiness, dizziness, irritability, or confusion by preventing your blood sugar levels from going too low  · Treat a low blood sugar level right away  ? Drink 4 ounces of juice or have 1 tube of glucose gel  ? Check your blood sugar level again 10 to 15 minutes later  ? When the level goes back to normal, eat a meal or snack to prevent another decrease  · Keep glucose gel, raisins, or hard candy with you at all times to treat a low blood sugar level  · Your blood sugar level can get too low if you take diabetes medicine or insulin and do not eat enough food  · If you use insulin, check your blood sugar level before you exercise  ? If your blood sugar level is below 100 mg/dL, eat 4 crackers or 2 ounces of raisins, or drink 4 ounces of juice  ? Check your level every 30 minutes if you exercise more than 1 hour  ? You may need a snack during or after exercise  What you can do to manage your blood sugar levels:   · Check your blood sugar levels as directed and as needed  Several items are available to use to check your levels   You may need to check by testing a drop of blood in a glucose monitor  You may instead be given a continuous glucose monitoring (CGM) device  The device is worn at all times  The CGM checks your blood sugar level every 5 minutes  It sends results to an electronic device such as a smart phone  A CGM can be used with or without an insulin pump  Talk with your provider to find out which method is best for you  The goal for blood sugar levels before meals  is between 80 and 130 mg/dL and 2 hours after eating  is lower than 180 mg/dL  · Make healthy food choices  Work with a dietitian to develop a meal plan that works for you and your schedule  A dietitian can help you learn how to eat the right amount of carbohydrates during your meals and snacks  Carbohydrates can raise your blood sugar level if you eat too many at one time  Examples of foods that contain carbohydrates are breads, cereals, rice, pasta, and sweets  · Get regular physical activity  Physical activity can help you get to your target blood sugar level goal and manage your weight  Get at least 150 minutes of moderate to vigorous aerobic physical activity each week  Do not miss more than 2 days in a row  Do not sit longer than 30 minutes at a time  Your healthcare provider can help you create an activity plan  The plan can include the best activities for you and can help you build your strength and endurance  · Maintain a healthy weight  Ask your healthcare provider what a healthy weight is for you  Ask him or her to help you create a safe weight loss plan if you are overweight  · Take your diabetes medicine or insulin as directed  You may need diabetes medicine, insulin, or both to help control your blood sugar levels  Your healthcare provider will teach you how and when to take your diabetes medicine or insulin  You will also be taught about side effects oral diabetes medicine can cause   Insulin may be injected, or given through a pump or pen  You and your care team will discuss which method is best for you  ? An insulin pump  is an implanted device that gives your insulin 24 hours a day  An insulin pump prevents the need for multiple insulin injections in a day  ? An insulin pen  is a device prefilled with the right amount of insulin  ? You and your family members will be taught how to draw up and give insulin  if this is the best method for you  Your education team will also teach you how to dispose of needles and syringes  ? You will learn how much insulin you need  and when to give it  You will be taught when not to give insulin  You will also be taught what to do if your blood sugar level drops too low  This may happen if you take insulin and do not eat the right amount of carbohydrates  Other things you can do to manage type 2 diabetes:   · Wear medical alert identification  Wear medical alert jewelry or carry a card that says you have diabetes  Ask your provider where to get these items  · Do not smoke  Nicotine and other chemicals in cigarettes and cigars can cause lung and blood vessel damage  It also makes it more difficult to manage your diabetes  Ask your provider for information if you currently smoke and need help to quit  Do not use e-cigarettes or smokeless tobacco in place of cigarettes or to help you quit  They still contain nicotine  · Check your feet each day for cuts, scratches, calluses, or other wounds  Look for redness and swelling, and feel for warmth  Wear shoes that fit well  Check your shoes for rocks or other objects that can hurt your feet  Do not walk barefoot or wear shoes without socks  Wear cotton socks to help keep your feet dry  · Ask about vaccines you may need  You have a higher risk for serious illness if you get the flu, pneumonia, COVID-19, or hepatitis   Ask your provider if you should get vaccines to prevent these or other diseases, and when to get the vaccines  · Talk to your care team if you become stressed about diabetes care  Sometimes being able to fit diabetes care into your life can cause increased stress  The stress can cause you not to take care of yourself properly  Your care team can help by offering tips about self-care  Your care team may suggest you talk to a mental health provider  The provider can listen and offer help with self-care issues  Follow up with your doctor or diabetes care team as directed: You may need to have blood tests done before your follow-up visit  The test results will show if changes need to be made in your treatment or self-care  Write down your questions so you remember to ask them during your visits  Talk to your provider if you cannot afford your medicine  © Copyright [employer] 2022 Information is for End User's use only and may not be sold, redistributed or otherwise used for commercial purposes  All illustrations and images included in CareNotes® are the copyrighted property of A D A M , Inc  or [facility] Maria G Bethea   The above information is an  only  It is not intended as medical advice for individual conditions or treatments  Talk to your doctor, nurse or pharmacist before following any medical regimen to see if it is safe and effective for you

## 2023-01-23 ENCOUNTER — TELEPHONE (OUTPATIENT)
Dept: ADMINISTRATIVE | Facility: OTHER | Age: 53
End: 2023-01-23

## 2023-01-23 NOTE — TELEPHONE ENCOUNTER
Upon review of the In Basket request and the patient's chart, initial outreach has been made via fax to facility  Please see Contacts section for details       Thank you  Gold Morrissey

## 2023-01-23 NOTE — LETTER
[facility] Diabetic Eye Exam Form    Date Requested: 23  Patient: Lelia Sandhoff  Patient : [id number]   Referring Provider: DMITRY Tran      DIABETIC Eye Exam Date _______________________________      Type of Exam MUST be documented for Diabetic Eye Exams  Please CHECK ONE  Retinal Exam       Dilated Retinal Exam       OCT       Optomap-Iris Exam      Fundus Photography       Left Eye - Please check Retinopathy or No Retinopathy        Exam did show retinopathy    Exam did not show retinopathy       Right Eye - Please check Retinopathy or No Retinopathy       Exam did show retinopathy    Exam did not show retinopathy       Comments __ ________________________________________________________    Practice Providing Exam ______________________________________________    Exam Performed By (print name) _______________________________________      Provider Signature ___________________________________________________      These reports are needed for  compliance  Please fax this completed form and a copy of the Diabetic Eye Exam report to our office located at [place] [address] as soon as possible via Fax  Fax [phone] attention [employer]: Phone [phone]  We thank you for your assistance in treating our mutual patient

## 2023-01-23 NOTE — TELEPHONE ENCOUNTER
----- Message from Ronald Rubalcava sent at 1/20/2023  2:29 PM EST -----  Regarding: Diabetic Eye Exam  01/20/23 2:31 PM    Hello, our patient Conception Simple has had Diabetic Eye Exam completed/performed  Please assist in updating the patient chart by making an External outreach to [employer] located in [place], [place]  The date of service is around 11/20/2022      Thank you,  Ronald Key

## 2023-01-24 ENCOUNTER — TELEPHONE (OUTPATIENT)
Dept: GASTROENTEROLOGY | Facility: CLINIC | Age: 53
End: 2023-01-24

## 2023-01-24 ENCOUNTER — PREP FOR PROCEDURE (OUTPATIENT)
Dept: GASTROENTEROLOGY | Facility: CLINIC | Age: 53
End: 2023-01-24

## 2023-01-24 DIAGNOSIS — Z12.11 SCREENING FOR COLON CANCER: Primary | ICD-10-CM

## 2023-01-24 NOTE — TELEPHONE ENCOUNTER
OA colonoscopy scheduled  Prep instructions reviewed  Patient is diabetic  Will email prep instructions and diabetic instructions to patient as requested       Please approve [employer] to [employer]

## 2023-01-30 NOTE — TELEPHONE ENCOUNTER
Upon review of the In Basket request we have found as a result of outreach that patient did not have the requested item(s) completed  Any additional questions or concerns should be emailed to the Practice Liaisons via the appropriate education email address, please do not reply via In Basket      Thank you  Olivier Serrano

## 2023-03-03 DIAGNOSIS — F51.01 PRIMARY INSOMNIA: ICD-10-CM

## 2023-03-03 RX ORDER — DOXEPIN HYDROCHLORIDE 10 MG/1
10 CAPSULE ORAL
Qty: 30 CAPSULE | Refills: 1 | Status: SHIPPED | OUTPATIENT
Start: 2023-03-03

## 2023-03-15 ENCOUNTER — TELEPHONE (OUTPATIENT)
Dept: GASTROENTEROLOGY | Facility: CLINIC | Age: 53
End: 2023-03-15

## 2023-03-15 DIAGNOSIS — E11.65 TYPE 2 DIABETES MELLITUS WITH HYPERGLYCEMIA, WITH LONG-TERM CURRENT USE OF INSULIN (HCC): ICD-10-CM

## 2023-03-15 DIAGNOSIS — Z79.4 TYPE 2 DIABETES MELLITUS WITH HYPERGLYCEMIA, WITH LONG-TERM CURRENT USE OF INSULIN (HCC): ICD-10-CM

## 2023-03-15 DIAGNOSIS — E11.9 TYPE 2 DIABETES MELLITUS WITHOUT COMPLICATION, WITHOUT LONG-TERM CURRENT USE OF INSULIN (HCC): ICD-10-CM

## 2023-03-15 RX ORDER — INSULIN ASPART 100 [IU]/ML
INJECTION, SOLUTION INTRAVENOUS; SUBCUTANEOUS
Qty: 15 ML | Refills: 1 | Status: SHIPPED | OUTPATIENT
Start: 2023-03-15

## 2023-03-15 RX ORDER — INSULIN ASPART 100 [IU]/ML
INJECTION, SOLUTION INTRAVENOUS; SUBCUTANEOUS
Qty: 90 ML | Refills: 1 | Status: SHIPPED | OUTPATIENT
Start: 2023-03-15

## 2023-03-15 NOTE — TELEPHONE ENCOUNTER
Name of medication/s patient is requesting to be refilled  Novolog vials and Pens (hers previously , she uses these for emergency )      Medication dosage  - Pt doesn't know, it's in the pump      How often is medication being taken      Is patient requesting 30 or 90 day supply, 90      Name of [address] [place] [address]       Last Visit 2023  Next Visit Visit date not found

## 2023-03-27 ENCOUNTER — TELEPHONE (OUTPATIENT)
Dept: ENDOCRINOLOGY | Facility: CLINIC | Age: 53
End: 2023-03-27

## 2023-03-27 DIAGNOSIS — E11.9 TYPE 2 DIABETES MELLITUS WITHOUT COMPLICATION, WITHOUT LONG-TERM CURRENT USE OF INSULIN (HCC): ICD-10-CM

## 2023-03-27 DIAGNOSIS — Z79.4 TYPE 2 DIABETES MELLITUS WITH HYPERGLYCEMIA, WITH LONG-TERM CURRENT USE OF INSULIN (HCC): ICD-10-CM

## 2023-03-27 DIAGNOSIS — E11.65 TYPE 2 DIABETES MELLITUS WITH HYPERGLYCEMIA, WITH LONG-TERM CURRENT USE OF INSULIN (HCC): ICD-10-CM

## 2023-03-27 NOTE — TELEPHONE ENCOUNTER
Patient needs refill for the freestyle jeanie sensors and freestyle jeanie test strips her insurance told her that these need to [facility]

## 2023-03-28 DIAGNOSIS — E11.9 TYPE 2 DIABETES MELLITUS WITHOUT COMPLICATION, WITHOUT LONG-TERM CURRENT USE OF INSULIN (HCC): ICD-10-CM

## 2023-03-28 DIAGNOSIS — E11.65 TYPE 2 DIABETES MELLITUS WITH HYPERGLYCEMIA, WITH LONG-TERM CURRENT USE OF INSULIN (HCC): ICD-10-CM

## 2023-03-28 DIAGNOSIS — Z79.4 TYPE 2 DIABETES MELLITUS WITH HYPERGLYCEMIA, WITH LONG-TERM CURRENT USE OF INSULIN (HCC): ICD-10-CM

## 2023-03-28 RX ORDER — BLOOD SUGAR DIAGNOSTIC
STRIP MISCELLANEOUS
Qty: 120 STRIP | Refills: 3 | Status: SHIPPED | OUTPATIENT
Start: 2023-03-28

## 2023-03-28 RX ORDER — FLASH GLUCOSE SENSOR
KIT MISCELLANEOUS
Qty: 6 EACH | Refills: 1 | Status: SHIPPED | OUTPATIENT
Start: 2023-03-28

## 2023-03-28 NOTE — TELEPHONE ENCOUNTER
Called and spoke to Roger Osgood at [employer]  Will fax over documentation that is needed for patient to [phone]

## 2023-04-28 DIAGNOSIS — E11.65 TYPE 2 DIABETES MELLITUS WITH HYPERGLYCEMIA, WITH LONG-TERM CURRENT USE OF INSULIN (HCC): Primary | ICD-10-CM

## 2023-04-28 DIAGNOSIS — Z79.4 TYPE 2 DIABETES MELLITUS WITH HYPERGLYCEMIA, WITH LONG-TERM CURRENT USE OF INSULIN (HCC): Primary | ICD-10-CM

## 2023-05-03 DIAGNOSIS — F51.01 PRIMARY INSOMNIA: ICD-10-CM

## 2023-05-03 RX ORDER — DOXEPIN HYDROCHLORIDE 10 MG/1
10 CAPSULE ORAL
Qty: 30 CAPSULE | Refills: 1 | Status: SHIPPED | OUTPATIENT
Start: 2023-05-03

## 2023-05-31 ENCOUNTER — TELEPHONE (OUTPATIENT)
Dept: FAMILY MEDICINE CLINIC | Facility: CLINIC | Age: 53
End: 2023-05-31

## 2023-05-31 NOTE — TELEPHONE ENCOUNTER
"Patient called complaining of possible UTI  Antonette Lowers that she has been having burning towards the end of emptying her bladder  Asked if something could be sent into the pharmacy today for her  Informed that provider is no longer in the office  Patient asked if I could send msg to another provider to see if they would call something in because she is \"very uncomfortable\"  She is coming in tomorrow morning to give a urine sample but would like a medication sent in tonight  Please advise     "

## 2023-06-01 ENCOUNTER — OFFICE VISIT (OUTPATIENT)
Dept: FAMILY MEDICINE CLINIC | Facility: CLINIC | Age: 53
End: 2023-06-01

## 2023-06-01 ENCOUNTER — APPOINTMENT (OUTPATIENT)
Dept: RADIOLOGY | Facility: CLINIC | Age: 53
End: 2023-06-01
Payer: COMMERCIAL

## 2023-06-01 VITALS
WEIGHT: 293 LBS | BODY MASS INDEX: 47.09 KG/M2 | TEMPERATURE: 98.4 F | HEIGHT: 66 IN | SYSTOLIC BLOOD PRESSURE: 130 MMHG | HEART RATE: 80 BPM | OXYGEN SATURATION: 95 % | DIASTOLIC BLOOD PRESSURE: 80 MMHG

## 2023-06-01 DIAGNOSIS — R16.1 SPLENOMEGALY: ICD-10-CM

## 2023-06-01 DIAGNOSIS — R30.0 BURNING WITH URINATION: ICD-10-CM

## 2023-06-01 DIAGNOSIS — Z79.4 TYPE 2 DIABETES MELLITUS WITHOUT COMPLICATION, WITH LONG-TERM CURRENT USE OF INSULIN (HCC): ICD-10-CM

## 2023-06-01 DIAGNOSIS — R16.0 HEPATOMEGALY: ICD-10-CM

## 2023-06-01 DIAGNOSIS — N39.0 URINARY TRACT INFECTION WITHOUT HEMATURIA, SITE UNSPECIFIED: ICD-10-CM

## 2023-06-01 DIAGNOSIS — R35.0 URINE FREQUENCY: Primary | ICD-10-CM

## 2023-06-01 DIAGNOSIS — C69.42 MALIGNANT MELANOMA OF UVEA OF LEFT EYE (HCC): ICD-10-CM

## 2023-06-01 DIAGNOSIS — E11.9 TYPE 2 DIABETES MELLITUS WITHOUT COMPLICATION, WITH LONG-TERM CURRENT USE OF INSULIN (HCC): ICD-10-CM

## 2023-06-01 DIAGNOSIS — R35.0 URINE FREQUENCY: ICD-10-CM

## 2023-06-01 DIAGNOSIS — E66.01 MORBID OBESITY (HCC): ICD-10-CM

## 2023-06-01 LAB
SL AMB  POCT GLUCOSE, UA: NEGATIVE
SL AMB LEUKOCYTE ESTERASE,UA: ABNORMAL
SL AMB POCT BILIRUBIN,UA: NEGATIVE
SL AMB POCT BLOOD,UA: ABNORMAL
SL AMB POCT CLARITY,UA: ABNORMAL
SL AMB POCT COLOR,UA: YELLOW
SL AMB POCT KETONES,UA: NEGATIVE
SL AMB POCT NITRITE,UA: NEGATIVE
SL AMB POCT PH,UA: 5
SL AMB POCT SPECIFIC GRAVITY,UA: >=1.03
SL AMB POCT URINE PROTEIN: 30
SL AMB POCT UROBILINOGEN: 0.2

## 2023-06-01 PROCEDURE — 71046 X-RAY EXAM CHEST 2 VIEWS: CPT

## 2023-06-01 PROCEDURE — 87077 CULTURE AEROBIC IDENTIFY: CPT | Performed by: NURSE PRACTITIONER

## 2023-06-01 PROCEDURE — 87186 SC STD MICRODIL/AGAR DIL: CPT | Performed by: NURSE PRACTITIONER

## 2023-06-01 PROCEDURE — 87086 URINE CULTURE/COLONY COUNT: CPT | Performed by: NURSE PRACTITIONER

## 2023-06-01 RX ORDER — SULFAMETHOXAZOLE AND TRIMETHOPRIM 800; 160 MG/1; MG/1
1 TABLET ORAL EVERY 12 HOURS SCHEDULED
Qty: 10 TABLET | Refills: 0 | Status: SHIPPED | OUTPATIENT
Start: 2023-06-01 | End: 2023-06-06

## 2023-06-01 RX ORDER — PHENAZOPYRIDINE HYDROCHLORIDE 100 MG/1
100 TABLET, FILM COATED ORAL 3 TIMES DAILY PRN
Qty: 10 TABLET | Refills: 0 | Status: SHIPPED | OUTPATIENT
Start: 2023-06-01
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
## 2023-06-03 LAB
BACTERIA UR CULT: ABNORMAL
BACTERIA UR CULT: ABNORMAL

## 2023-06-05 ENCOUNTER — TELEPHONE (OUTPATIENT)
Dept: FAMILY MEDICINE CLINIC | Facility: CLINIC | Age: 53
End: 2023-06-05
Payer: COMMERCIAL

## 2023-06-05 DIAGNOSIS — C69.42 MALIGNANT MELANOMA OF UVEA OF LEFT EYE (HCC): Primary | ICD-10-CM

## 2023-06-05 PROCEDURE — 99214 OFFICE O/P EST MOD 30 MIN: CPT

## 2023-06-05 NOTE — TELEPHONE ENCOUNTER
Juan Miller stopped in and she will be going to see Dr Juan Seymour and Juan Seymour Md (Oncology) July 5th at 9:20 AM    She will need an MRI done before this appointment  And all other testing or results to be completed      She will scheduled her [address]    She wasn't sure if she needs a referral and insurance referral    MRI is of abdomen t focus on liver with and without contrast   A information page is scanned in media and placed in Jj folder    Please advise    Phone: [phone]

## 2023-06-06 NOTE — TELEPHONE ENCOUNTER
MRI ordered  BW orders are already in the system  Chest xray was already done  Attempted to contact patient to inform  Line was busy  Unable to speak to patient

## 2023-06-08 ENCOUNTER — TELEPHONE (OUTPATIENT)
Dept: PULMONOLOGY | Facility: CLINIC | Age: 53
End: 2023-06-08

## 2023-06-08 NOTE — TELEPHONE ENCOUNTER
Patient is having trouble with her  Freestyle Rico 2 sensors  It is tellling her that they are are not compatible  She would like to know if we have a sensor here for her?

## 2023-06-08 NOTE — TELEPHONE ENCOUNTER
Spoke to patient she will be stopping by the office for the sample sensor we have for the [place] 2

## 2023-06-20 ENCOUNTER — TELEPHONE (OUTPATIENT)
Dept: FAMILY MEDICINE CLINIC | Facility: CLINIC | Age: 53
End: 2023-06-20

## 2023-06-20 ENCOUNTER — APPOINTMENT (OUTPATIENT)
Dept: LAB | Facility: HOSPITAL | Age: 53
End: 2023-06-20
Payer: COMMERCIAL

## 2023-06-20 DIAGNOSIS — E11.65 TYPE 2 DIABETES MELLITUS WITH HYPERGLYCEMIA, WITH LONG-TERM CURRENT USE OF INSULIN (HCC): Primary | ICD-10-CM

## 2023-06-20 DIAGNOSIS — Z79.4 TYPE 2 DIABETES MELLITUS WITH HYPERGLYCEMIA, WITH LONG-TERM CURRENT USE OF INSULIN (HCC): Primary | ICD-10-CM

## 2023-06-20 DIAGNOSIS — E11.65 TYPE 2 DIABETES MELLITUS WITH HYPERGLYCEMIA, WITH LONG-TERM CURRENT USE OF INSULIN (HCC): ICD-10-CM

## 2023-06-20 DIAGNOSIS — Z79.4 TYPE 2 DIABETES MELLITUS WITH HYPERGLYCEMIA, WITH LONG-TERM CURRENT USE OF INSULIN (HCC): ICD-10-CM

## 2023-06-20 LAB
ANION GAP SERPL CALCULATED.3IONS-SCNC: 7 MMOL/L
BUN SERPL-MCNC: 18 MG/DL (ref 5–25)
CALCIUM SERPL-MCNC: 9.5 MG/DL (ref 8.4–10.2)
CHLORIDE SERPL-SCNC: 105 MMOL/L (ref 96–108)
CO2 SERPL-SCNC: 24 MMOL/L (ref 21–32)
CREAT SERPL-MCNC: 0.93 MG/DL (ref 0.6–1.3)
GFR SERPL CREATININE-BSD FRML MDRD: 71 ML/MIN/1.73SQ M
GLUCOSE P FAST SERPL-MCNC: 114 MG/DL (ref 65–99)
POTASSIUM SERPL-SCNC: 4.8 MMOL/L (ref 3.5–5.3)
SODIUM SERPL-SCNC: 136 MMOL/L (ref 135–147)

## 2023-06-20 PROCEDURE — 36415 COLL VENOUS BLD VENIPUNCTURE: CPT

## 2023-06-20 PROCEDURE — 80048 BASIC METABOLIC PNL TOTAL CA: CPT

## 2023-06-20 NOTE — TELEPHONE ENCOUNTER
Pt called back she said that she needs to have blood work done prior to her MRI and it needs to be ordered STAT    Told her we will call her to let her know that it is in they system

## 2023-06-21 ENCOUNTER — OFFICE VISIT (OUTPATIENT)
Dept: ENDOCRINOLOGY | Facility: CLINIC | Age: 53
End: 2023-06-21
Payer: COMMERCIAL

## 2023-06-21 ENCOUNTER — HOSPITAL ENCOUNTER (OUTPATIENT)
Dept: ULTRASOUND IMAGING | Facility: HOSPITAL | Age: 53
Discharge: HOME | End: 2023-06-21
Payer: COMMERCIAL

## 2023-06-21 ENCOUNTER — HOSPITAL ENCOUNTER (OUTPATIENT)
Dept: MRI IMAGING | Facility: HOSPITAL | Age: 53
Discharge: HOME | End: 2023-06-21
Payer: COMMERCIAL

## 2023-06-21 VITALS
HEART RATE: 80 BPM | SYSTOLIC BLOOD PRESSURE: 130 MMHG | DIASTOLIC BLOOD PRESSURE: 80 MMHG | HEIGHT: 66 IN | BODY MASS INDEX: 47.09 KG/M2 | RESPIRATION RATE: 18 BRPM | OXYGEN SATURATION: 97 % | WEIGHT: 293 LBS

## 2023-06-21 DIAGNOSIS — R16.1 SPLENOMEGALY: ICD-10-CM

## 2023-06-21 DIAGNOSIS — Z79.4 TYPE 2 DIABETES MELLITUS WITH HYPERGLYCEMIA, WITH LONG-TERM CURRENT USE OF INSULIN (HCC): Primary | ICD-10-CM

## 2023-06-21 DIAGNOSIS — R16.0 HEPATOMEGALY: ICD-10-CM

## 2023-06-21 DIAGNOSIS — C69.42 MALIGNANT MELANOMA OF UVEA OF LEFT EYE (HCC): ICD-10-CM

## 2023-06-21 DIAGNOSIS — E11.65 TYPE 2 DIABETES MELLITUS WITH HYPERGLYCEMIA, WITH LONG-TERM CURRENT USE OF INSULIN (HCC): Primary | ICD-10-CM

## 2023-06-21 DIAGNOSIS — E66.01 MORBID OBESITY (HCC): ICD-10-CM

## 2023-06-21 DIAGNOSIS — G47.33 OSA (OBSTRUCTIVE SLEEP APNEA): ICD-10-CM

## 2023-06-21 DIAGNOSIS — I10 ESSENTIAL HYPERTENSION: ICD-10-CM

## 2023-06-21 LAB — SL AMB POCT HEMOGLOBIN AIC: 6.8 (ref ?–6.5)

## 2023-06-21 PROCEDURE — 95251 CONT GLUC MNTR ANALYSIS I&R: CPT | Performed by: NURSE PRACTITIONER

## 2023-06-21 PROCEDURE — 99214 OFFICE O/P EST MOD 30 MIN: CPT | Performed by: NURSE PRACTITIONER

## 2023-06-21 PROCEDURE — 83036 HEMOGLOBIN GLYCOSYLATED A1C: CPT | Performed by: NURSE PRACTITIONER

## 2023-06-21 PROCEDURE — 76700 US EXAM ABDOM COMPLETE: CPT

## 2023-06-21 PROCEDURE — A9585 GADOBUTROL INJECTION: HCPCS | Performed by: NURSE PRACTITIONER

## 2023-06-21 PROCEDURE — 74183 MRI ABD W/O CNTR FLWD CNTR: CPT

## 2023-06-21 RX ADMIN — GADOBUTROL 14 ML: 604.72 INJECTION INTRAVENOUS at 07:42
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
## 2023-06-21 NOTE — ASSESSMENT & PLAN NOTE
Patient remains well controlled with minimal episodes of hyperglycemia  No changes made to pump settings today  Reminded patient to bolus prior to meals/high carb snacks  Instructions provided on how to switch her PDM devices  Patient knows to notify me with persistent hyperglycemia or episodes of hypoglycemia  F/u in 4 months    Lab Results   Component Value Date    HGBA1C 6 8 (A) 06/21/2023

## 2023-07-05 ENCOUNTER — HOSPITAL ENCOUNTER (EMERGENCY)
Facility: HOSPITAL | Age: 53
Discharge: HOME | End: 2023-07-05
Attending: EMERGENCY MEDICINE
Payer: COMMERCIAL

## 2023-07-05 ENCOUNTER — OFFICE VISIT (OUTPATIENT)
Dept: URGENT CARE | Facility: CLINIC | Age: 53
End: 2023-07-05
Payer: COMMERCIAL

## 2023-07-05 VITALS
HEART RATE: 87 BPM | RESPIRATION RATE: 16 BRPM | HEIGHT: 66 IN | BODY MASS INDEX: 47.09 KG/M2 | WEIGHT: 293 LBS | TEMPERATURE: 98.6 F | SYSTOLIC BLOOD PRESSURE: 132 MMHG | DIASTOLIC BLOOD PRESSURE: 80 MMHG | OXYGEN SATURATION: 96 %

## 2023-07-05 VITALS
WEIGHT: 293 LBS | SYSTOLIC BLOOD PRESSURE: 157 MMHG | HEART RATE: 79 BPM | OXYGEN SATURATION: 96 % | TEMPERATURE: 97.2 F | RESPIRATION RATE: 16 BRPM | DIASTOLIC BLOOD PRESSURE: 72 MMHG | BODY MASS INDEX: 50.04 KG/M2

## 2023-07-05 DIAGNOSIS — L03.312 CELLULITIS OF SKIN OF BACK: Primary | ICD-10-CM

## 2023-07-05 DIAGNOSIS — L08.9 INFECTED WOUND: Primary | ICD-10-CM

## 2023-07-05 DIAGNOSIS — R73.9 HYPERGLYCEMIA: ICD-10-CM

## 2023-07-05 DIAGNOSIS — T14.8XXA INFECTED WOUND: Primary | ICD-10-CM

## 2023-07-05 LAB
ALBUMIN SERPL BCP-MCNC: 3.9 G/DL (ref 3.5–5)
ALP SERPL-CCNC: 65 U/L (ref 34–104)
ALT SERPL W P-5'-P-CCNC: 12 U/L (ref 7–52)
ANION GAP SERPL CALCULATED.3IONS-SCNC: 9 MMOL/L
APTT PPP: 29 SECONDS (ref 23–37)
AST SERPL W P-5'-P-CCNC: 12 U/L (ref 13–39)
BASOPHILS # BLD AUTO: 0.02 THOUSANDS/ÂΜL (ref 0–0.1)
BASOPHILS NFR BLD AUTO: 0 % (ref 0–1)
BILIRUB SERPL-MCNC: 0.46 MG/DL (ref 0.2–1)
BUN SERPL-MCNC: 11 MG/DL (ref 5–25)
CALCIUM SERPL-MCNC: 9.1 MG/DL (ref 8.4–10.2)
CHLORIDE SERPL-SCNC: 105 MMOL/L (ref 96–108)
CO2 SERPL-SCNC: 23 MMOL/L (ref 21–32)
CREAT SERPL-MCNC: 0.81 MG/DL (ref 0.6–1.3)
EOSINOPHIL # BLD AUTO: 0.2 THOUSAND/ÂΜL (ref 0–0.61)
EOSINOPHIL NFR BLD AUTO: 3 % (ref 0–6)
ERYTHROCYTE [DISTWIDTH] IN BLOOD BY AUTOMATED COUNT: 12.7 % (ref 11.6–15.1)
GFR SERPL CREATININE-BSD FRML MDRD: 84 ML/MIN/1.73SQ M
GLUCOSE SERPL-MCNC: 177 MG/DL (ref 65–140)
HCT VFR BLD AUTO: 44.5 % (ref 34.8–46.1)
HGB BLD-MCNC: 14.2 G/DL (ref 11.5–15.4)
IMM GRANULOCYTES # BLD AUTO: 0.01 THOUSAND/UL (ref 0–0.2)
IMM GRANULOCYTES NFR BLD AUTO: 0 % (ref 0–2)
INR PPP: 1.02 (ref 0.84–1.19)
LACTATE SERPL-SCNC: 1.3 MMOL/L (ref 0.5–2)
LYMPHOCYTES # BLD AUTO: 1.44 THOUSANDS/ÂΜL (ref 0.6–4.47)
LYMPHOCYTES NFR BLD AUTO: 22 % (ref 14–44)
MCH RBC QN AUTO: 28.5 PG (ref 26.8–34.3)
MCHC RBC AUTO-ENTMCNC: 31.9 G/DL (ref 31.4–37.4)
MCV RBC AUTO: 89 FL (ref 82–98)
MONOCYTES # BLD AUTO: 0.67 THOUSAND/ÂΜL (ref 0.17–1.22)
MONOCYTES NFR BLD AUTO: 10 % (ref 4–12)
NEUTROPHILS # BLD AUTO: 4.08 THOUSANDS/ÂΜL (ref 1.85–7.62)
NEUTS SEG NFR BLD AUTO: 65 % (ref 43–75)
NRBC BLD AUTO-RTO: 0 /100 WBCS
PLATELET # BLD AUTO: 196 THOUSANDS/UL (ref 149–390)
PMV BLD AUTO: 9.6 FL (ref 8.9–12.7)
POTASSIUM SERPL-SCNC: 3.8 MMOL/L (ref 3.5–5.3)
PROCALCITONIN SERPL-MCNC: <0.05 NG/ML
PROT SERPL-MCNC: 6.9 G/DL (ref 6.4–8.4)
PROTHROMBIN TIME: 13.4 SECONDS (ref 11.6–14.5)
RBC # BLD AUTO: 4.99 MILLION/UL (ref 3.81–5.12)
SODIUM SERPL-SCNC: 137 MMOL/L (ref 135–147)
WBC # BLD AUTO: 6.42 THOUSAND/UL (ref 4.31–10.16)

## 2023-07-05 PROCEDURE — 36415 COLL VENOUS BLD VENIPUNCTURE: CPT | Performed by: EMERGENCY MEDICINE

## 2023-07-05 PROCEDURE — 83605 ASSAY OF LACTIC ACID: CPT | Performed by: EMERGENCY MEDICINE

## 2023-07-05 PROCEDURE — 85610 PROTHROMBIN TIME: CPT | Performed by: EMERGENCY MEDICINE

## 2023-07-05 PROCEDURE — 84145 PROCALCITONIN (PCT): CPT | Performed by: EMERGENCY MEDICINE

## 2023-07-05 PROCEDURE — 80053 COMPREHEN METABOLIC PANEL: CPT | Performed by: EMERGENCY MEDICINE

## 2023-07-05 PROCEDURE — 99213 OFFICE O/P EST LOW 20 MIN: CPT | Performed by: PHYSICIAN ASSISTANT

## 2023-07-05 PROCEDURE — 85730 THROMBOPLASTIN TIME PARTIAL: CPT | Performed by: EMERGENCY MEDICINE

## 2023-07-05 PROCEDURE — 85025 COMPLETE CBC W/AUTO DIFF WBC: CPT | Performed by: EMERGENCY MEDICINE

## 2023-07-05 PROCEDURE — S9088 SERVICES PROVIDED IN URGENT: HCPCS | Performed by: PHYSICIAN ASSISTANT

## 2023-07-05 PROCEDURE — 87040 BLOOD CULTURE FOR BACTERIA: CPT | Performed by: EMERGENCY MEDICINE

## 2023-07-05 RX ORDER — DOXYCYCLINE HYCLATE 100 MG/1
100 CAPSULE ORAL ONCE
Status: COMPLETED | OUTPATIENT
Start: 2023-07-05 | End: 2023-07-05

## 2023-07-05 RX ORDER — DOXYCYCLINE 100 MG/1
100 TABLET ORAL 2 TIMES DAILY
Qty: 14 TABLET | Refills: 0 | Status: SHIPPED | OUTPATIENT
Start: 2023-07-05 | End: 2023-07-12

## 2023-07-05 RX ADMIN — DOXYCYCLINE 100 MG: 100 CAPSULE ORAL at 16:35
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
## 2023-07-05 NOTE — DISCHARGE INSTRUCTIONS
Please start the medication as prescribed to by previous provider specifically the doxycycline. If you do not see any significant movement in the next already 48 -72 hours or start to have any fever, chills return without delay to a medical provider.
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
## 2023-07-06 PROBLEM — L03.312 CELLULITIS OF SKIN OF BACK: Status: ACTIVE | Noted: 2023-07-06

## 2023-07-09 LAB
BACTERIA BLD CULT: NORMAL
BACTERIA BLD CULT: NORMAL

## 2023-07-10 LAB
BACTERIA BLD CULT: NORMAL
BACTERIA BLD CULT: NORMAL

## 2023-07-10 PROCEDURE — 99283 EMERGENCY DEPT VISIT LOW MDM: CPT

## 2023-07-11 ENCOUNTER — HOSPITAL ENCOUNTER (EMERGENCY)
Facility: HOSPITAL | Age: 53
Discharge: HOME | End: 2023-07-11
Attending: EMERGENCY MEDICINE
Payer: COMMERCIAL

## 2023-07-11 VITALS
HEIGHT: 66 IN | OXYGEN SATURATION: 100 % | WEIGHT: 293 LBS | DIASTOLIC BLOOD PRESSURE: 90 MMHG | BODY MASS INDEX: 47.09 KG/M2 | RESPIRATION RATE: 18 BRPM | SYSTOLIC BLOOD PRESSURE: 179 MMHG | HEART RATE: 77 BPM

## 2023-07-11 DIAGNOSIS — S81.812A LACERATION OF LEFT LOWER EXTREMITY, INITIAL ENCOUNTER: Primary | ICD-10-CM

## 2023-07-11 RX ORDER — CEPHALEXIN 500 MG/1
500 CAPSULE ORAL EVERY 8 HOURS SCHEDULED
Qty: 21 CAPSULE | Refills: 0 | Status: SHIPPED | OUTPATIENT
Start: 2023-07-11 | End: 2023-07-18

## 2023-07-11 RX ORDER — LIDOCAINE HYDROCHLORIDE AND EPINEPHRINE 10; 10 MG/ML; UG/ML
1 INJECTION, SOLUTION INFILTRATION; PERINEURAL ONCE
Status: COMPLETED | OUTPATIENT
Start: 2023-07-11 | End: 2023-07-11

## 2023-07-11 RX ORDER — CEPHALEXIN 500 MG/1
500 CAPSULE ORAL ONCE
Status: COMPLETED | OUTPATIENT
Start: 2023-07-11 | End: 2023-07-11

## 2023-07-11 RX ADMIN — CEPHALEXIN 500 MG: 500 CAPSULE ORAL at 00:55

## 2023-07-11 RX ADMIN — LIDOCAINE HYDROCHLORIDE,EPINEPHRINE BITARTRATE 1 ML: 10; .01 INJECTION, SOLUTION INFILTRATION; PERINEURAL at 00:41
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
## 2023-07-18 ENCOUNTER — OFFICE VISIT (OUTPATIENT)
Dept: URGENT CARE | Facility: CLINIC | Age: 53
End: 2023-07-18
Payer: COMMERCIAL

## 2023-07-18 VITALS
OXYGEN SATURATION: 99 % | SYSTOLIC BLOOD PRESSURE: 126 MMHG | WEIGHT: 293 LBS | BODY MASS INDEX: 47.09 KG/M2 | HEART RATE: 68 BPM | DIASTOLIC BLOOD PRESSURE: 80 MMHG | RESPIRATION RATE: 18 BRPM | HEIGHT: 66 IN

## 2023-07-18 DIAGNOSIS — Z48.02 ENCOUNTER FOR REMOVAL OF SUTURES: Primary | ICD-10-CM

## 2023-07-18 PROCEDURE — 99211 OFF/OP EST MAY X REQ PHY/QHP: CPT
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
## 2023-07-20 ENCOUNTER — RA CDI HCC (OUTPATIENT)
Dept: OTHER | Facility: HOSPITAL | Age: 53
End: 2023-07-20

## 2023-07-20 NOTE — PROGRESS NOTES
[facility] [address] [facility] coding opportunities       Chart reviewed, no opportunity found: CHART REVIEWED, [facility]     Patients Insurance     Medicare Insurance: [employer]

## 2023-07-25 ENCOUNTER — TELEPHONE (OUTPATIENT)
Dept: ENDOCRINOLOGY | Facility: CLINIC | Age: 53
End: 2023-07-25

## 2023-07-25 NOTE — TELEPHONE ENCOUNTER
Pt is getting winston bladder surgery on august 9th. Patient is supposed to start a liquid diet tomorrow, but is concerned about going on this with having her insulin pump.      Please advise

## 2023-07-26 NOTE — TELEPHONE ENCOUNTER
Spoke to patient tried guiding her through how to make the changes to her basal rate per hour. She stated her dash pdm did not go down to 0.8 units per hour. Advised patient we will have our Omnipod Rep to contact her for assistance.

## 2023-07-26 NOTE — TELEPHONE ENCOUNTER
I recommend that patient reduce her basal rate to 0.8 units per hour. Do not bolus for carbs. She will likely run higher than she prefers but this is preferable to fighting low blood sugars.

## 2023-07-31 ENCOUNTER — APPOINTMENT (OUTPATIENT)
Dept: LAB | Facility: HOSPITAL | Age: 53
End: 2023-07-31
Payer: COMMERCIAL

## 2023-07-31 ENCOUNTER — OFFICE VISIT (OUTPATIENT)
Dept: LAB | Facility: HOSPITAL | Age: 53
End: 2023-07-31
Payer: COMMERCIAL

## 2023-07-31 DIAGNOSIS — Z01.818 PRE-OP TESTING: ICD-10-CM

## 2023-07-31 LAB
ATRIAL RATE: 62 BPM
P AXIS: 39 DEGREES
PR INTERVAL: 204 MS
QRS AXIS: 12 DEGREES
QRSD INTERVAL: 88 MS
QT INTERVAL: 428 MS
QTC INTERVAL: 434 MS
T WAVE AXIS: 39 DEGREES
VENTRICULAR RATE: 62 BPM

## 2023-07-31 PROCEDURE — 93010 ELECTROCARDIOGRAM REPORT: CPT | Performed by: INTERNAL MEDICINE

## 2023-07-31 PROCEDURE — 93005 ELECTROCARDIOGRAM TRACING: CPT

## 2023-08-02 ENCOUNTER — CONSULT (OUTPATIENT)
Dept: FAMILY MEDICINE CLINIC | Facility: CLINIC | Age: 53
End: 2023-08-02
Payer: COMMERCIAL

## 2023-08-02 VITALS
DIASTOLIC BLOOD PRESSURE: 84 MMHG | HEIGHT: 66 IN | TEMPERATURE: 98.2 F | WEIGHT: 293 LBS | OXYGEN SATURATION: 98 % | BODY MASS INDEX: 47.09 KG/M2 | SYSTOLIC BLOOD PRESSURE: 134 MMHG | HEART RATE: 80 BPM

## 2023-08-02 DIAGNOSIS — K81.9 CHOLECYSTITIS: ICD-10-CM

## 2023-08-02 DIAGNOSIS — I10 ESSENTIAL HYPERTENSION: ICD-10-CM

## 2023-08-02 DIAGNOSIS — Z79.4 TYPE 2 DIABETES MELLITUS WITH HYPERGLYCEMIA, WITH LONG-TERM CURRENT USE OF INSULIN (HCC): ICD-10-CM

## 2023-08-02 DIAGNOSIS — E11.65 TYPE 2 DIABETES MELLITUS WITH HYPERGLYCEMIA, WITH LONG-TERM CURRENT USE OF INSULIN (HCC): ICD-10-CM

## 2023-08-02 DIAGNOSIS — R10.10 PAIN OF UPPER ABDOMEN: ICD-10-CM

## 2023-08-02 DIAGNOSIS — K21.9 GASTROESOPHAGEAL REFLUX DISEASE WITHOUT ESOPHAGITIS: Chronic | ICD-10-CM

## 2023-08-02 DIAGNOSIS — Z01.818 PRE-OP EVALUATION: Primary | ICD-10-CM

## 2023-08-02 PROCEDURE — 99214 OFFICE O/P EST MOD 30 MIN: CPT | Performed by: NURSE PRACTITIONER

## 2023-08-02 RX ORDER — EMPAGLIFLOZIN 25 MG/1
25 TABLET, FILM COATED ORAL EVERY MORNING
Qty: 90 TABLET | Refills: 1 | Status: SHIPPED | OUTPATIENT
Start: 2023-08-02

## 2023-08-02 RX ORDER — OMEPRAZOLE 40 MG/1
40 CAPSULE, DELAYED RELEASE ORAL DAILY
Qty: 90 CAPSULE | Refills: 3 | Status: SHIPPED | OUTPATIENT
Start: 2023-08-02

## 2023-08-02 RX ORDER — INSULIN ASPART 100 [IU]/ML
INJECTION, SOLUTION INTRAVENOUS; SUBCUTANEOUS
Qty: 90 ML | Refills: 1 | Status: SHIPPED | OUTPATIENT
Start: 2023-08-02
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
## 2023-08-16 ENCOUNTER — TELEPHONE (OUTPATIENT)
Dept: DIABETES SERVICES | Facility: CLINIC | Age: 53
End: 2023-08-16

## 2023-08-16 NOTE — TELEPHONE ENCOUNTER
Called and spoke to patient. She said the screen cracked on her [employer] PDM so they sent her a replacement. She needs help setting it up.  Referred to Omnipod rep

## 2023-08-16 NOTE — TELEPHONE ENCOUNTER
Akshat Sauceda,     Pt left message that she needs to set up an appointment to be trained with her new pump she recieved- Omnipod Dash.      Thank You

## 2023-08-22 ENCOUNTER — TELEPHONE (OUTPATIENT)
Dept: ENDOCRINOLOGY | Facility: CLINIC | Age: 53
End: 2023-08-22

## 2023-08-22 DIAGNOSIS — E11.9 TYPE 2 DIABETES MELLITUS WITHOUT COMPLICATION, WITHOUT LONG-TERM CURRENT USE OF INSULIN (HCC): ICD-10-CM

## 2023-08-22 DIAGNOSIS — Z79.4 TYPE 2 DIABETES MELLITUS WITHOUT COMPLICATION, WITH LONG-TERM CURRENT USE OF INSULIN (HCC): ICD-10-CM

## 2023-08-22 DIAGNOSIS — E11.9 TYPE 2 DIABETES MELLITUS WITHOUT COMPLICATION, WITH LONG-TERM CURRENT USE OF INSULIN (HCC): ICD-10-CM

## 2023-08-22 RX ORDER — INSULIN ASPART 100 [IU]/ML
INJECTION, SOLUTION INTRAVENOUS; SUBCUTANEOUS
Qty: 15 ML | Refills: 1 | Status: SHIPPED | OUTPATIENT
Start: 2023-08-22

## 2023-08-22 RX ORDER — INSULIN DETEMIR 100 [IU]/ML
36 INJECTION, SOLUTION SUBCUTANEOUS
Qty: 30 ML | Refills: 1 | Status: SHIPPED | OUTPATIENT
Start: 2023-08-22

## 2023-08-22 RX ORDER — FLURBIPROFEN SODIUM 0.3 MG/ML
SOLUTION/ DROPS OPHTHALMIC
Qty: 300 EACH | Refills: 1 | Status: SHIPPED | OUTPATIENT
Start: 2023-08-22

## 2023-08-22 NOTE — TELEPHONE ENCOUNTER
The following refills have been sent:    Pen needles to be used 4x daily to inject insulin. As patient is not currently using her pump:    Inject 36 units of levemir nightly.      Novolog flexpen: Inject 12-14 units three times daily before meals based on following sliding scale:    1 units for ever 5 grams of carbohydrates  1 units of insulin for every 20 mg/dL above 100 mg/dL      Her previous pump settings are:    Current Insulin pump settings:  Basal rate:1.6 u/hr  Insulin to carb ratio:1:5  Insulin sensitivity factor: 20  BG target:100 mg/dL  Active Insulin time: 4     Type of insulin:  Novolog

## 2023-08-22 NOTE — TELEPHONE ENCOUNTER
Patient needs a refill for the novolog flexpen refill sent to [facility].     Patient also called reuqesting a sliding scale script for her old pump, due to her pump breaking

## 2023-08-22 NOTE — TELEPHONE ENCOUNTER
Pt called for a status update of request.     Pt states she needs a refill of her Treadwell. Pt states she is going to a [facility] either today or tomorrow and is asking for a rush on this.

## 2023-08-23 NOTE — PROGRESS NOTES
Patient is admitting herself to [facility]. Staff is not medical. Specific insulin administration orders requested as patient is not currently using her Omnipod 5 pump. Communication form faxed to [phone].

## 2023-08-23 NOTE — TELEPHONE ENCOUNTER
Per Marcelle Maldonado to ask patient if she has any idea when she'd be getting back on her pump. I called Pt and spoke with her. She stated that she doesn't know. She spoke to someone a few days ago from [address] She states someone from [address] is supposed to be coming out to Pt's home to help get her set up but she has not heard back from [address]

## 2023-08-23 NOTE — LETTER
August 23, 2023     Vi Motta    Patient: Femi Bright   YOB: 1970   Date of Visit: 8/23/2023         Insulin Administration Orders    Inject 36 units of levemir nightly. Inject 12 units of Novolog three times daily before breakfast, lunch, and dinner. Please administer approximately 15 minutes before meal.    In case of blood sugar less than 70 mg/dL, please administer 15 g of carbohydrates. Wait 15 minutes. If blood sugar remains below 70 mg/dL, administer another 15 g of carbohydrates. Repeat until blood sugar is above 70 mg/dL, then allow patient to eat a small meal with protein/fat (peanut butter crackers, cheese and crackers, apple and peanut butter). If patient is having frequent episodes of blood sugars less than 70 mg/dL, please call the office.      If blood sugar is greater than 180 mg/dL prior to meals, use correction scale in addition to pre-meal bolus of 12 units:    180-200 +1  201-220 +2  221-240 +3  241-260 +4  261-280 +5  281-300 +6  >300 mg/dL Please call office and provide blood sugars for review      If patient is able to resume her pump, which is preferable, please use following pump settings:    Basal rate: 1.6 units/hr  Insulin to carb ratio: 1:5  Insulin sensitivity factor: 20  BG target: 100 mg/dL  Active insulin time: 4 hours    Type of insulin to be used in pump: Novolog

## 2023-09-05 DIAGNOSIS — Z79.4 TYPE 2 DIABETES MELLITUS WITH HYPERGLYCEMIA, WITH LONG-TERM CURRENT USE OF INSULIN (HCC): ICD-10-CM

## 2023-09-05 DIAGNOSIS — E11.65 TYPE 2 DIABETES MELLITUS WITH HYPERGLYCEMIA, WITH LONG-TERM CURRENT USE OF INSULIN (HCC): ICD-10-CM

## 2023-09-05 RX ORDER — INSULIN ASPART 100 [IU]/ML
INJECTION, SOLUTION INTRAVENOUS; SUBCUTANEOUS
Qty: 90 ML | Refills: 1 | Status: SHIPPED | OUTPATIENT
Start: 2023-09-05

## 2023-09-11 DIAGNOSIS — F51.01 PRIMARY INSOMNIA: ICD-10-CM

## 2023-09-13 RX ORDER — DOXEPIN HYDROCHLORIDE 10 MG/1
10 CAPSULE ORAL
Qty: 30 CAPSULE | Refills: 1 | Status: SHIPPED | OUTPATIENT
Start: 2023-09-13

## 2023-09-20 DIAGNOSIS — E78.00 HYPERCHOLESTEROLEMIA: ICD-10-CM

## 2023-09-20 RX ORDER — ATORVASTATIN CALCIUM 10 MG/1
10 TABLET, FILM COATED ORAL DAILY
Qty: 90 TABLET | Refills: 3 | Status: ON HOLD | OUTPATIENT
Start: 2023-09-20 | End: 2023-10-11 | Stop reason: SDUPTHER

## 2023-10-02 ENCOUNTER — HOSPITAL ENCOUNTER (EMERGENCY)
Facility: HOSPITAL | Age: 53
End: 2023-10-04
Attending: EMERGENCY MEDICINE | Admitting: EMERGENCY MEDICINE
Payer: COMMERCIAL

## 2023-10-02 DIAGNOSIS — J45.30 MILD PERSISTENT ASTHMA WITHOUT COMPLICATION: ICD-10-CM

## 2023-10-02 DIAGNOSIS — E11.9 DM (DIABETES MELLITUS) (HCC): ICD-10-CM

## 2023-10-02 DIAGNOSIS — R45.851 DEPRESSION WITH SUICIDAL IDEATION: Primary | ICD-10-CM

## 2023-10-02 DIAGNOSIS — F32.A DEPRESSION WITH SUICIDAL IDEATION: Primary | ICD-10-CM

## 2023-10-02 DIAGNOSIS — K21.9 GASTROESOPHAGEAL REFLUX DISEASE WITHOUT ESOPHAGITIS: ICD-10-CM

## 2023-10-02 DIAGNOSIS — E11.65 TYPE 2 DIABETES MELLITUS WITH HYPERGLYCEMIA, WITH LONG-TERM CURRENT USE OF INSULIN (HCC): ICD-10-CM

## 2023-10-02 DIAGNOSIS — Z79.4 TYPE 2 DIABETES MELLITUS WITH HYPERGLYCEMIA, WITH LONG-TERM CURRENT USE OF INSULIN (HCC): ICD-10-CM

## 2023-10-02 DIAGNOSIS — I10 ESSENTIAL HYPERTENSION: ICD-10-CM

## 2023-10-02 DIAGNOSIS — E66.9 OBESITY: ICD-10-CM

## 2023-10-02 LAB
ALBUMIN SERPL BCP-MCNC: 4.3 G/DL (ref 3.5–5)
ALP SERPL-CCNC: 75 U/L (ref 34–104)
ALT SERPL W P-5'-P-CCNC: 16 U/L (ref 7–52)
AMPHETAMINES SERPL QL SCN: NEGATIVE
ANION GAP SERPL CALCULATED.3IONS-SCNC: 10 MMOL/L
AST SERPL W P-5'-P-CCNC: 19 U/L (ref 13–39)
BARBITURATES UR QL: NEGATIVE
BASOPHILS # BLD AUTO: 0.03 THOUSANDS/ÂΜL (ref 0–0.1)
BASOPHILS NFR BLD AUTO: 1 % (ref 0–1)
BENZODIAZ UR QL: NEGATIVE
BILIRUB SERPL-MCNC: 0.8 MG/DL (ref 0.2–1)
BUN SERPL-MCNC: 12 MG/DL (ref 5–25)
CALCIUM SERPL-MCNC: 9.1 MG/DL (ref 8.4–10.2)
CHLORIDE SERPL-SCNC: 106 MMOL/L (ref 96–108)
CO2 SERPL-SCNC: 23 MMOL/L (ref 21–32)
COCAINE UR QL: NEGATIVE
CREAT SERPL-MCNC: 0.95 MG/DL (ref 0.6–1.3)
EOSINOPHIL # BLD AUTO: 0.19 THOUSAND/ÂΜL (ref 0–0.61)
EOSINOPHIL NFR BLD AUTO: 3 % (ref 0–6)
ERYTHROCYTE [DISTWIDTH] IN BLOOD BY AUTOMATED COUNT: 13 % (ref 11.6–15.1)
ETHANOL SERPL-MCNC: <10 MG/DL
GFR SERPL CREATININE-BSD FRML MDRD: 70 ML/MIN/1.73SQ M
GLUCOSE SERPL-MCNC: 135 MG/DL (ref 65–140)
HCT VFR BLD AUTO: 47.3 % (ref 34.8–46.1)
HGB BLD-MCNC: 15.2 G/DL (ref 11.5–15.4)
IMM GRANULOCYTES # BLD AUTO: 0.01 THOUSAND/UL (ref 0–0.2)
IMM GRANULOCYTES NFR BLD AUTO: 0 % (ref 0–2)
LYMPHOCYTES # BLD AUTO: 1.51 THOUSANDS/ÂΜL (ref 0.6–4.47)
LYMPHOCYTES NFR BLD AUTO: 23 % (ref 14–44)
MCH RBC QN AUTO: 28.6 PG (ref 26.8–34.3)
MCHC RBC AUTO-ENTMCNC: 32.1 G/DL (ref 31.4–37.4)
MCV RBC AUTO: 89 FL (ref 82–98)
MONOCYTES # BLD AUTO: 0.49 THOUSAND/ÂΜL (ref 0.17–1.22)
MONOCYTES NFR BLD AUTO: 7 % (ref 4–12)
NEUTROPHILS # BLD AUTO: 4.35 THOUSANDS/ÂΜL (ref 1.85–7.62)
NEUTS SEG NFR BLD AUTO: 66 % (ref 43–75)
NRBC BLD AUTO-RTO: 0 /100 WBCS
OPIATES UR QL SCN: NEGATIVE
OXYCODONE+OXYMORPHONE UR QL SCN: NEGATIVE
PCP UR QL: NEGATIVE
PLATELET # BLD AUTO: 195 THOUSANDS/UL (ref 149–390)
PMV BLD AUTO: 9.2 FL (ref 8.9–12.7)
POTASSIUM SERPL-SCNC: 4 MMOL/L (ref 3.5–5.3)
PROT SERPL-MCNC: 7.2 G/DL (ref 6.4–8.4)
RBC # BLD AUTO: 5.32 MILLION/UL (ref 3.81–5.12)
SODIUM SERPL-SCNC: 139 MMOL/L (ref 135–147)
THC UR QL: NEGATIVE
TSH SERPL DL<=0.05 MIU/L-ACNC: 0.9 UIU/ML (ref 0.45–4.5)
WBC # BLD AUTO: 6.58 THOUSAND/UL (ref 4.31–10.16)

## 2023-10-02 PROCEDURE — 84703 CHORIONIC GONADOTROPIN ASSAY: CPT | Performed by: EMERGENCY MEDICINE

## 2023-10-02 PROCEDURE — 80307 DRUG TEST PRSMV CHEM ANLYZR: CPT | Performed by: EMERGENCY MEDICINE

## 2023-10-02 PROCEDURE — 85025 COMPLETE CBC W/AUTO DIFF WBC: CPT | Performed by: EMERGENCY MEDICINE

## 2023-10-02 PROCEDURE — 99291 CRITICAL CARE FIRST HOUR: CPT | Performed by: EMERGENCY MEDICINE

## 2023-10-02 PROCEDURE — 84443 ASSAY THYROID STIM HORMONE: CPT | Performed by: EMERGENCY MEDICINE

## 2023-10-02 PROCEDURE — 99285 EMERGENCY DEPT VISIT HI MDM: CPT | Performed by: EMERGENCY MEDICINE

## 2023-10-02 PROCEDURE — 80053 COMPREHEN METABOLIC PANEL: CPT | Performed by: EMERGENCY MEDICINE

## 2023-10-02 PROCEDURE — 82077 ASSAY SPEC XCP UR&BREATH IA: CPT | Performed by: EMERGENCY MEDICINE

## 2023-10-02 PROCEDURE — 99285 EMERGENCY DEPT VISIT HI MDM: CPT

## 2023-10-02 PROCEDURE — 93005 ELECTROCARDIOGRAM TRACING: CPT

## 2023-10-02 PROCEDURE — 36415 COLL VENOUS BLD VENIPUNCTURE: CPT | Performed by: EMERGENCY MEDICINE

## 2023-10-02 NOTE — LETTER
[address] [address]  Dept: [phone]      EMTALA TRANSFER CONSENT    NAME Vega Sauer                                         1970                              MRN [id number]    I have been informed of my rights regarding examination, treatment, and transfer   by Dr. Lizzie Barnes DO    Benefits: Specialized equipment and/or services available at the receiving facility (Include comment)________________________    Risks: Potential for delay in receiving treatment      Transfer Request   I acknowledge that my medical condition has been evaluated and explained to me by the emergency department physician or other qualified medical person and/or my attending physician who has recommended and offered to me further medical examination and treatment. I understand the Hospital's obligation with respect to the treatment and stabilization of my emergency medical condition. I nevertheless request to be transferred. I release the Hospital, the doctor, and any other persons caring for me from all responsibility or liability for any injury or ill effects that may result from my transfer and agree to accept all responsibility for the consequences of my choice to transfer, rather than receive stabilizing treatment at the Hospital. I understand that because the transfer is my request, my insurance may not provide reimbursement for the services. The Hospital will assist and direct me and my family in how to make arrangements for transfer, but the hospital is not liable for any fees charged by the transport service. In spite of this understanding, I refuse to consent to further medical examination and treatment which has been offered to me, and request transfer to [address] [place] [address] [address] Name, [facility] [facility] : [facility].  I authorize the performance of emergency medical procedures and treatments upon me in both transit and upon arrival at the receiving facility. Additionally, I authorize the release of any and all medical records to the receiving facility and request they be transported with me, if possible. I authorize the performance of emergency medical procedures and treatments upon me in both transit and upon arrival at the receiving facility. Additionally, I authorize the release of any and all medical records to the receiving facility and request they be transported with me, if possible. I understand that the safest mode of transportation during a medical emergency is an ambulance and that the Hospital advocates the use of this mode of transport. Risks of traveling to the receiving facility by car, including absence of medical control, life sustaining equipment, such as oxygen, and medical personnel has been explained to me and I fully understand them. (WILLY CORRECT BOX BELOW)  [  ]  I consent to the stated transfer and to be transported by ambulance/helicopter. [  ]  I consent to the stated transfer, but refuse transportation by ambulance and accept full responsibility for my transportation by car. I understand the risks of non-ambulance transfers and I exonerate the Hospital and its staff from any deterioration in my condition that results from this refusal.    X___________________________________________    DATE  10/04/23  TIME________  Signature of patient or legally responsible individual signing on patient behalf           RELATIONSHIP TO PATIENT_________________________          Provider Certification    NAME Gloria Garcia                                        [facility] 1970                              MRN [id number]    A medical screening exam was performed on the above named patient. Based on the examination:    Condition Necessitating Transfer The primary encounter diagnosis was Depression with suicidal ideation.  Diagnoses of Obesity, DM (diabetes mellitus) ([address]), Type 2 diabetes mellitus with hyperglycemia, with long-term current use of insulin ([address]), Gastroesophageal reflux disease without esophagitis, Mild persistent asthma without complication, and Essential hypertension were also pertinent to this visit. Patient Condition: The patient has been stabilized such that within reasonable medical probability, no material deterioration of the patient condition or the condition of the unborn child(yadira) is likely to result from the transfer    Reason for Transfer: Level of Care needed not available at this facility    Transfer Requirements: [address] available and qualified personnel available for treatment as acknowledged by Juliette Estrada  Agreed to accept transfer and to provide appropriate medical treatment as acknowledged by       Dr. Kyle Vargas  Appropriate medical records of the examination and treatment of the patient are provided at the time of transfer   [address] _______  Transfer will be performed by qualified personnel from [facility]  and appropriate transfer equipment as required, including the use of necessary and appropriate life support measures.     Provider Certification: I have examined the patient and explained the following risks and benefits of being transferred/refusing transfer to the patient/family:  General risk, such as traffic hazards, adverse weather conditions, rough terrain or turbulence, possible failure of equipment (including vehicle or aircraft), or consequences of actions of persons outside the control of the transport personnel      Based on these reasonable risks and benefits to the patient and/or the unborn child(yadira), and based upon the information available at the time of the patient’s examination, I certify that the medical benefits reasonably to be expected from the provision of appropriate medical treatments at another medical facility outweigh the increasing risks, if any, to the individual’s medical condition, and in the case of labor to the unborn child, from effecting the transfer.     X____________________________________________ DATE 10/04/23        TIME_______      ORIGINAL - SEND TO MEDICAL RECORDS   COPY - SEND WITH PATIENT DURING TRANSFER

## 2023-10-03 ENCOUNTER — DOCUMENTATION (OUTPATIENT)
Dept: BEHAVIORAL/MENTAL HEALTH CLINIC | Facility: CLINIC | Age: 53
End: 2023-10-03

## 2023-10-03 LAB
ATRIAL RATE: 67 BPM
EXT PREGNANCY TEST URINE: NEGATIVE
EXT. CONTROL: NORMAL
FLUAV RNA RESP QL NAA+PROBE: NEGATIVE
FLUBV RNA RESP QL NAA+PROBE: NEGATIVE
GLUCOSE SERPL-MCNC: 121 MG/DL (ref 65–140)
GLUCOSE SERPL-MCNC: 136 MG/DL (ref 65–140)
GLUCOSE SERPL-MCNC: 137 MG/DL (ref 65–140)
GLUCOSE SERPL-MCNC: 161 MG/DL (ref 65–140)
HCG SERPL QL: NEGATIVE
P AXIS: 58 DEGREES
PR INTERVAL: 190 MS
QRS AXIS: 76 DEGREES
QRSD INTERVAL: 88 MS
QT INTERVAL: 438 MS
QTC INTERVAL: 462 MS
RSV RNA RESP QL NAA+PROBE: NEGATIVE
SARS-COV-2 RNA RESP QL NAA+PROBE: NEGATIVE
T WAVE AXIS: 42 DEGREES
VENTRICULAR RATE: 67 BPM

## 2023-10-03 PROCEDURE — 81025 URINE PREGNANCY TEST: CPT | Performed by: EMERGENCY MEDICINE

## 2023-10-03 PROCEDURE — 93010 ELECTROCARDIOGRAM REPORT: CPT | Performed by: INTERNAL MEDICINE

## 2023-10-03 PROCEDURE — 0241U HB NFCT DS VIR RESP RNA 4 TRGT: CPT | Performed by: EMERGENCY MEDICINE

## 2023-10-03 PROCEDURE — 96372 THER/PROPH/DIAG INJ SC/IM: CPT

## 2023-10-03 PROCEDURE — 82948 REAGENT STRIP/BLOOD GLUCOSE: CPT

## 2023-10-03 RX ORDER — BUSPIRONE HYDROCHLORIDE 5 MG/1
10 TABLET ORAL 2 TIMES DAILY
Status: DISCONTINUED | OUTPATIENT
Start: 2023-10-03 | End: 2023-10-04 | Stop reason: HOSPADM

## 2023-10-03 RX ORDER — INSULIN LISPRO 100 [IU]/ML
2-12 INJECTION, SOLUTION INTRAVENOUS; SUBCUTANEOUS
Status: DISCONTINUED | OUTPATIENT
Start: 2023-10-03 | End: 2023-10-04

## 2023-10-03 RX ORDER — HALOPERIDOL 5 MG/ML
5 INJECTION INTRAMUSCULAR ONCE
Status: COMPLETED | OUTPATIENT
Start: 2023-10-03 | End: 2023-10-03

## 2023-10-03 RX ORDER — LORAZEPAM 2 MG/ML
INJECTION INTRAMUSCULAR
Status: COMPLETED
Start: 2023-10-03 | End: 2023-10-03

## 2023-10-03 RX ORDER — DOXEPIN HYDROCHLORIDE 10 MG/1
10 CAPSULE ORAL
Status: DISCONTINUED | OUTPATIENT
Start: 2023-10-03 | End: 2023-10-04 | Stop reason: HOSPADM

## 2023-10-03 RX ORDER — OMEPRAZOLE 20 MG/1
40 CAPSULE, DELAYED RELEASE ORAL DAILY
Status: DISCONTINUED | OUTPATIENT
Start: 2023-10-03 | End: 2023-10-03

## 2023-10-03 RX ORDER — LORAZEPAM 2 MG/ML
2 INJECTION INTRAMUSCULAR ONCE
Status: COMPLETED | OUTPATIENT
Start: 2023-10-03 | End: 2023-10-03

## 2023-10-03 RX ORDER — HALOPERIDOL 5 MG/ML
INJECTION INTRAMUSCULAR
Status: COMPLETED
Start: 2023-10-03 | End: 2023-10-03

## 2023-10-03 RX ORDER — PHENAZOPYRIDINE HYDROCHLORIDE 100 MG/1
100 TABLET, FILM COATED ORAL 3 TIMES DAILY PRN
Status: DISCONTINUED | OUTPATIENT
Start: 2023-10-03 | End: 2023-10-04 | Stop reason: HOSPADM

## 2023-10-03 RX ORDER — FLUTICASONE PROPIONATE 50 MCG
1 SPRAY, SUSPENSION (ML) NASAL DAILY
Status: DISCONTINUED | OUTPATIENT
Start: 2023-10-03 | End: 2023-10-04 | Stop reason: HOSPADM

## 2023-10-03 RX ORDER — ATORVASTATIN CALCIUM 10 MG/1
10 TABLET, FILM COATED ORAL
Status: DISCONTINUED | OUTPATIENT
Start: 2023-10-03 | End: 2023-10-04 | Stop reason: HOSPADM

## 2023-10-03 RX ADMIN — ESCITALOPRAM OXALATE 15 MG: 5 TABLET, FILM COATED ORAL at 09:33

## 2023-10-03 RX ADMIN — LORAZEPAM 2 MG: 2 INJECTION INTRAMUSCULAR; INTRAVENOUS at 23:05

## 2023-10-03 RX ADMIN — INSULIN DETEMIR 36 UNITS: 100 INJECTION, SOLUTION SUBCUTANEOUS at 22:15

## 2023-10-03 RX ADMIN — Medication 40 MG: at 09:25

## 2023-10-03 RX ADMIN — FLUTICASONE PROPIONATE 1 SPRAY: 50 SPRAY, METERED NASAL at 09:27

## 2023-10-03 RX ADMIN — PHENAZOPYRIDINE 100 MG: 100 TABLET ORAL at 09:24

## 2023-10-03 RX ADMIN — ATORVASTATIN CALCIUM 10 MG: 10 TABLET, FILM COATED ORAL at 19:11

## 2023-10-03 RX ADMIN — INSULIN LISPRO 2 UNITS: 100 INJECTION, SOLUTION INTRAVENOUS; SUBCUTANEOUS at 13:31

## 2023-10-03 RX ADMIN — HALOPERIDOL LACTATE 5 MG: 5 INJECTION, SOLUTION INTRAMUSCULAR at 23:04

## 2023-10-03 RX ADMIN — EMPAGLIFLOZIN 25 MG: 25 TABLET, FILM COATED ORAL at 09:25

## 2023-10-03 RX ADMIN — LORAZEPAM 2 MG: 2 INJECTION INTRAMUSCULAR at 23:05

## 2023-10-03 RX ADMIN — DOXEPIN HYDROCHLORIDE 10 MG: 10 CAPSULE ORAL at 21:17

## 2023-10-03 RX ADMIN — HALOPERIDOL 5 MG: 5 INJECTION INTRAMUSCULAR at 23:04

## 2023-10-03 RX ADMIN — BUSPIRONE HYDROCHLORIDE 10 MG: 5 TABLET ORAL at 21:17

## 2023-10-03 RX ADMIN — BUSPIRONE HYDROCHLORIDE 10 MG: 5 TABLET ORAL at 09:24

## 2023-10-03 RX ADMIN — B-COMPLEX W/ C & FOLIC ACID TAB 1 TABLET: TAB at 09:24
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
## 2023-10-03 NOTE — ED NOTES
Per [facility] admissions, appropriate bed not available to accommodate pt as he has insulin pump.

## 2023-10-03 NOTE — ED NOTES
Patient was brought to the ED due to fleeting suicidal thoughts after finding out her SSD has been cut off. Patient has a history of mental health: Depression, PTSD, and Anxiety. Patient is struggling with racing thoughts triggered by the possibility of losing her home, which patient shares with her . Patient verbalized she would "blow her brains out. .. for her  to have the insurance money to have a place to live." Patient states if she is to go home she could "overdose" on her diabetic medication. Patient prefers to go [facility] ([facility] and if there is no bed available is willing to go inpatient. Crisis worker contacted [facility], referal will be faxed, possible bed, and [facility] will follow up with AM crisis worker.

## 2023-10-03 NOTE — ED NOTES
Crisis reached out to [facility]. If bed available pt to be reviewed in the morning by treatment team due to medical issues.

## 2023-10-03 NOTE — ED NOTES
See previous [facility] Suicide Risk Assessment. Re-screening not required unless change in behavior or suicidal ideation. Behavioral Health Assessment deferred as patient is sleeping and would benefit from additional rest.  Vital signs deferred until patient awake, no signs or symptoms of respiratory distress at this time. Once patient is awake and able to participate, will complete assessments.       Gianna, [address] [place] [address]  10/03/23 5279

## 2023-10-03 NOTE — ED NOTES
Pt was denied due to presenting to the ED with a gun and planning on killing herself while OD on medication. The patient was not able to immediately contract for safety with [facility] as well.

## 2023-10-03 NOTE — ED NOTES
201 bed search:    Juliana Rinaldi, per [facility] for review. [facility] admissions-send for review. Sent clinicals & 201 to [address] for review.

## 2023-10-03 NOTE — ED NOTES
When going through patient belongings, pt notified this writer about a gun in her purse. Heidi RN notified, Security notified, and Hospital Supervisor notified. [facility] notified. Officer Justice Early came to retrieve firearm.         Savannah Frausto  10/02/23 9611

## 2023-10-03 NOTE — ED CARE HANDOFF
Emergency Department Sign Out Note        Sign out and transfer of care from Kai Gilliam. See Separate Emergency Department note. The patient, Richard Rodriguez, was evaluated by the previous provider for psychiatric evaluation. Workup Completed:  Psychiatric evaluation    ED Course / Workup Pending (followup): None, pending placement. Medically cleared for psychiatric placement                                     Procedures  MDM        Disposition  Final diagnoses:   Depression with suicidal ideation   Obesity   DM (diabetes mellitus) ([address])     Time reflects when diagnosis was documented in both MDM as applicable and the Disposition within this note     Time User Action Codes Description Comment    10/3/2023 12:32 AM You Ormond Add [R45.851] Suicidal ideations     10/3/2023 12:32 AM You Ormond Remove [R45.851] Suicidal ideations     10/3/2023 12:32 AM You Ormond Add Dominick.Baptise. A,  R45.851] Depression with suicidal ideation     10/3/2023 12:32 AM You Ormond Add [E66.9] Obesity     10/3/2023 12:32 AM You Ormond Add [E11.9] DM (diabetes mellitus) [facility])       ED Disposition     None      MD Documentation    Aurea Siddiqui Most Recent Value   Sending MD Dr Neisha Upton      Follow-up Information    None       Patient's Medications   Discharge Prescriptions    No medications on file     No discharge procedures on file.        ED Provider  Electronically Signed by     Carmita Baig MD  10/03/23 9180

## 2023-10-03 NOTE — ED NOTES
Patient also has belongings in bubble (too big to fit in locker) and is labeled     Debbi Sadaflane  10/03/23 [address] Bailee Diogenes  10/03/23 0187

## 2023-10-04 ENCOUNTER — DOCUMENTATION (OUTPATIENT)
Dept: PSYCHIATRY | Facility: CLINIC | Age: 53
End: 2023-10-04

## 2023-10-04 ENCOUNTER — HOSPITAL ENCOUNTER (INPATIENT)
Facility: HOSPITAL | Age: 53
LOS: 8 days | Discharge: HOME | DRG: 885 | End: 2023-10-12
Attending: PSYCHIATRY & NEUROLOGY | Admitting: PSYCHIATRY & NEUROLOGY
Payer: COMMERCIAL

## 2023-10-04 VITALS
DIASTOLIC BLOOD PRESSURE: 74 MMHG | TEMPERATURE: 97.9 F | RESPIRATION RATE: 20 BRPM | OXYGEN SATURATION: 97 % | HEART RATE: 80 BPM | SYSTOLIC BLOOD PRESSURE: 128 MMHG

## 2023-10-04 DIAGNOSIS — F17.200 SMOKER: ICD-10-CM

## 2023-10-04 DIAGNOSIS — E55.9 VITAMIN D DEFICIENCY: ICD-10-CM

## 2023-10-04 DIAGNOSIS — J45.30 MILD PERSISTENT ASTHMA WITHOUT COMPLICATION: ICD-10-CM

## 2023-10-04 DIAGNOSIS — F43.10 PTSD (POST-TRAUMATIC STRESS DISORDER): ICD-10-CM

## 2023-10-04 DIAGNOSIS — I10 ESSENTIAL HYPERTENSION: ICD-10-CM

## 2023-10-04 DIAGNOSIS — Z79.4 TYPE 2 DIABETES MELLITUS WITH HYPERGLYCEMIA, WITH LONG-TERM CURRENT USE OF INSULIN (HCC): ICD-10-CM

## 2023-10-04 DIAGNOSIS — K21.9 GASTROESOPHAGEAL REFLUX DISEASE WITHOUT ESOPHAGITIS: ICD-10-CM

## 2023-10-04 DIAGNOSIS — F41.1 GENERALIZED ANXIETY DISORDER: ICD-10-CM

## 2023-10-04 DIAGNOSIS — L30.4 INTERTRIGO: ICD-10-CM

## 2023-10-04 DIAGNOSIS — E53.8 VITAMIN B12 DEFICIENCY: ICD-10-CM

## 2023-10-04 DIAGNOSIS — E78.00 HYPERCHOLESTEROLEMIA: ICD-10-CM

## 2023-10-04 DIAGNOSIS — F33.2 SEVERE EPISODE OF RECURRENT MAJOR DEPRESSIVE DISORDER, WITHOUT PSYCHOTIC FEATURES (HCC): Primary | ICD-10-CM

## 2023-10-04 DIAGNOSIS — R10.10 PAIN OF UPPER ABDOMEN: ICD-10-CM

## 2023-10-04 DIAGNOSIS — E11.65 TYPE 2 DIABETES MELLITUS WITH HYPERGLYCEMIA, WITH LONG-TERM CURRENT USE OF INSULIN (HCC): ICD-10-CM

## 2023-10-04 LAB
GLUCOSE SERPL-MCNC: 117 MG/DL (ref 65–140)
GLUCOSE SERPL-MCNC: 121 MG/DL (ref 65–140)
GLUCOSE SERPL-MCNC: 143 MG/DL (ref 65–140)
GLUCOSE SERPL-MCNC: 186 MG/DL (ref 65–140)

## 2023-10-04 PROCEDURE — 90686 IIV4 VACC NO PRSV 0.5 ML IM: CPT | Performed by: FAMILY MEDICINE

## 2023-10-04 PROCEDURE — 82948 REAGENT STRIP/BLOOD GLUCOSE: CPT

## 2023-10-04 PROCEDURE — G0008 ADMIN INFLUENZA VIRUS VAC: HCPCS | Performed by: FAMILY MEDICINE

## 2023-10-04 RX ORDER — PHENAZOPYRIDINE HYDROCHLORIDE 100 MG/1
100 TABLET, FILM COATED ORAL 3 TIMES DAILY PRN
Status: CANCELLED | OUTPATIENT
Start: 2023-10-04

## 2023-10-04 RX ORDER — LORAZEPAM 0.5 MG/1
0.5 TABLET ORAL
Status: DISCONTINUED | OUTPATIENT
Start: 2023-10-04 | End: 2023-10-12 | Stop reason: HOSPADM

## 2023-10-04 RX ORDER — BUSPIRONE HYDROCHLORIDE 10 MG/1
10 TABLET ORAL 2 TIMES DAILY
Status: DISCONTINUED | OUTPATIENT
Start: 2023-10-04 | End: 2023-10-05

## 2023-10-04 RX ORDER — PHENAZOPYRIDINE HYDROCHLORIDE 100 MG/1
100 TABLET, FILM COATED ORAL 3 TIMES DAILY PRN
Status: DISCONTINUED | OUTPATIENT
Start: 2023-10-04 | End: 2023-10-12 | Stop reason: HOSPADM

## 2023-10-04 RX ORDER — INSULIN LISPRO 100 [IU]/ML
2-12 INJECTION, SOLUTION INTRAVENOUS; SUBCUTANEOUS
Status: CANCELLED | OUTPATIENT
Start: 2023-10-04

## 2023-10-04 RX ORDER — BENZTROPINE MESYLATE 0.5 MG/1
0.5 TABLET ORAL
Status: DISCONTINUED | OUTPATIENT
Start: 2023-10-04 | End: 2023-10-12 | Stop reason: HOSPADM

## 2023-10-04 RX ORDER — LORAZEPAM 1 MG/1
1 TABLET ORAL
Status: CANCELLED | OUTPATIENT
Start: 2023-10-04

## 2023-10-04 RX ORDER — RISPERIDONE 0.25 MG/1
0.25 TABLET ORAL
Status: DISCONTINUED | OUTPATIENT
Start: 2023-10-04 | End: 2023-10-12 | Stop reason: HOSPADM

## 2023-10-04 RX ORDER — LORAZEPAM 2 MG/ML
1 INJECTION INTRAMUSCULAR
Status: DISCONTINUED | OUTPATIENT
Start: 2023-10-04 | End: 2023-10-12 | Stop reason: HOSPADM

## 2023-10-04 RX ORDER — RISPERIDONE 1 MG/1
1 TABLET ORAL
Status: CANCELLED | OUTPATIENT
Start: 2023-10-04

## 2023-10-04 RX ORDER — LORAZEPAM 2 MG/ML
1 INJECTION INTRAMUSCULAR
Status: CANCELLED | OUTPATIENT
Start: 2023-10-04

## 2023-10-04 RX ORDER — RISPERIDONE 0.25 MG/1
0.25 TABLET ORAL
Status: CANCELLED | OUTPATIENT
Start: 2023-10-04

## 2023-10-04 RX ORDER — HYDROXYZINE 50 MG/1
25 TABLET, FILM COATED ORAL
Status: CANCELLED | OUTPATIENT
Start: 2023-10-04

## 2023-10-04 RX ORDER — DOXEPIN HYDROCHLORIDE 10 MG/1
10 CAPSULE ORAL
Status: DISCONTINUED | OUTPATIENT
Start: 2023-10-04 | End: 2023-10-05

## 2023-10-04 RX ORDER — ALBUTEROL SULFATE 90 UG/1
2 AEROSOL, METERED RESPIRATORY (INHALATION) EVERY 4 HOURS PRN
Status: DISCONTINUED | OUTPATIENT
Start: 2023-10-04 | End: 2023-10-12 | Stop reason: HOSPADM

## 2023-10-04 RX ORDER — INSULIN LISPRO 100 [IU]/ML
2-12 INJECTION, SOLUTION INTRAVENOUS; SUBCUTANEOUS
Status: DISCONTINUED | OUTPATIENT
Start: 2023-10-04 | End: 2023-10-04 | Stop reason: HOSPADM

## 2023-10-04 RX ORDER — INSULIN LISPRO 100 [IU]/ML
2-12 INJECTION, SOLUTION INTRAVENOUS; SUBCUTANEOUS
Status: DISCONTINUED | OUTPATIENT
Start: 2023-10-04 | End: 2023-10-12 | Stop reason: HOSPADM

## 2023-10-04 RX ORDER — LANOLIN ALCOHOL/MO/W.PET/CERES
3 CREAM (GRAM) TOPICAL
Status: DISCONTINUED | OUTPATIENT
Start: 2023-10-04 | End: 2023-10-12 | Stop reason: HOSPADM

## 2023-10-04 RX ORDER — RISPERIDONE 0.25 MG/1
0.5 TABLET ORAL
Status: CANCELLED | OUTPATIENT
Start: 2023-10-04

## 2023-10-04 RX ORDER — HALOPERIDOL 5 MG/ML
5 INJECTION INTRAMUSCULAR
Status: DISCONTINUED | OUTPATIENT
Start: 2023-10-04 | End: 2023-10-12 | Stop reason: HOSPADM

## 2023-10-04 RX ORDER — VENLAFAXINE HYDROCHLORIDE 75 MG/1
75 CAPSULE, EXTENDED RELEASE ORAL DAILY
COMMUNITY
End: 2023-10-12

## 2023-10-04 RX ORDER — ACETAMINOPHEN 325 MG/1
650 TABLET ORAL EVERY 6 HOURS PRN
Status: DISCONTINUED | OUTPATIENT
Start: 2023-10-04 | End: 2023-10-12 | Stop reason: HOSPADM

## 2023-10-04 RX ORDER — RISPERIDONE 0.5 MG/1
0.5 TABLET ORAL
Status: DISCONTINUED | OUTPATIENT
Start: 2023-10-04 | End: 2023-10-12 | Stop reason: HOSPADM

## 2023-10-04 RX ORDER — NICOTINE 21 MG/24HR
1 PATCH, TRANSDERMAL 24 HOURS TRANSDERMAL DAILY
Status: DISCONTINUED | OUTPATIENT
Start: 2023-10-04 | End: 2023-10-12 | Stop reason: HOSPADM

## 2023-10-04 RX ORDER — INSULIN LISPRO 100 [IU]/ML
2-12 INJECTION, SOLUTION INTRAVENOUS; SUBCUTANEOUS
Status: DISCONTINUED | OUTPATIENT
Start: 2023-10-04 | End: 2023-10-05

## 2023-10-04 RX ORDER — FLUTICASONE PROPIONATE 50 MCG
1 SPRAY, SUSPENSION (ML) NASAL DAILY
Status: CANCELLED | OUTPATIENT
Start: 2023-10-05

## 2023-10-04 RX ORDER — LANOLIN ALCOHOL/MO/W.PET/CERES
3 CREAM (GRAM) TOPICAL
Status: CANCELLED | OUTPATIENT
Start: 2023-10-04

## 2023-10-04 RX ORDER — BUSPIRONE HYDROCHLORIDE 5 MG/1
10 TABLET ORAL 2 TIMES DAILY
Status: CANCELLED | OUTPATIENT
Start: 2023-10-04

## 2023-10-04 RX ORDER — LISINOPRIL 2.5 MG/1
2.5 TABLET ORAL DAILY
Status: DISCONTINUED | OUTPATIENT
Start: 2023-10-04 | End: 2023-10-05

## 2023-10-04 RX ORDER — HALOPERIDOL 5 MG/ML
5 INJECTION INTRAMUSCULAR
Status: CANCELLED | OUTPATIENT
Start: 2023-10-04

## 2023-10-04 RX ORDER — FLUTICASONE PROPIONATE 50 MCG
1 SPRAY, SUSPENSION (ML) NASAL DAILY
Status: DISCONTINUED | OUTPATIENT
Start: 2023-10-05 | End: 2023-10-12 | Stop reason: HOSPADM

## 2023-10-04 RX ORDER — DOXEPIN HYDROCHLORIDE 10 MG/1
10 CAPSULE ORAL
Status: CANCELLED | OUTPATIENT
Start: 2023-10-04

## 2023-10-04 RX ORDER — HYDROXYZINE HYDROCHLORIDE 25 MG/1
25 TABLET, FILM COATED ORAL
Status: DISCONTINUED | OUTPATIENT
Start: 2023-10-04 | End: 2023-10-05

## 2023-10-04 RX ORDER — ATORVASTATIN CALCIUM 10 MG/1
10 TABLET, FILM COATED ORAL
Status: DISCONTINUED | OUTPATIENT
Start: 2023-10-04 | End: 2023-10-12 | Stop reason: HOSPADM

## 2023-10-04 RX ORDER — BENZTROPINE MESYLATE 0.5 MG/1
0.5 TABLET ORAL
Status: CANCELLED | OUTPATIENT
Start: 2023-10-04

## 2023-10-04 RX ORDER — LORAZEPAM 1 MG/1
1 TABLET ORAL
Status: DISCONTINUED | OUTPATIENT
Start: 2023-10-04 | End: 2023-10-12 | Stop reason: HOSPADM

## 2023-10-04 RX ORDER — LORAZEPAM 1 MG/1
0.5 TABLET ORAL
Status: CANCELLED | OUTPATIENT
Start: 2023-10-04

## 2023-10-04 RX ORDER — RISPERIDONE 1 MG/1
1 TABLET ORAL
Status: DISCONTINUED | OUTPATIENT
Start: 2023-10-04 | End: 2023-10-12 | Stop reason: HOSPADM

## 2023-10-04 RX ORDER — PANTOPRAZOLE SODIUM 40 MG/1
40 TABLET, DELAYED RELEASE ORAL
Status: DISCONTINUED | OUTPATIENT
Start: 2023-10-05 | End: 2023-10-12 | Stop reason: HOSPADM

## 2023-10-04 RX ORDER — ATORVASTATIN CALCIUM 10 MG/1
10 TABLET, FILM COATED ORAL
Status: CANCELLED | OUTPATIENT
Start: 2023-10-04

## 2023-10-04 RX ADMIN — ATORVASTATIN CALCIUM 10 MG: 10 TABLET, FILM COATED ORAL at 16:42

## 2023-10-04 RX ADMIN — EMPAGLIFLOZIN 25 MG: 25 TABLET, FILM COATED ORAL at 08:08

## 2023-10-04 RX ADMIN — BUSPIRONE HYDROCHLORIDE 10 MG: 5 TABLET ORAL at 08:09

## 2023-10-04 RX ADMIN — ESCITALOPRAM OXALATE 15 MG: 5 TABLET, FILM COATED ORAL at 08:09

## 2023-10-04 RX ADMIN — FLUTICASONE PROPIONATE 1 SPRAY: 50 SPRAY, METERED NASAL at 08:09

## 2023-10-04 RX ADMIN — NICOTINE 1 PATCH: 21 PATCH, EXTENDED RELEASE TRANSDERMAL at 16:44

## 2023-10-04 RX ADMIN — BUSPIRONE HYDROCHLORIDE 10 MG: 10 TABLET ORAL at 21:29

## 2023-10-04 RX ADMIN — SEMAGLUTIDE 1 MG: 1.34 INJECTION, SOLUTION SUBCUTANEOUS at 16:47

## 2023-10-04 RX ADMIN — INFLUENZA VIRUS VACCINE 0.5 ML: 15; 15; 15; 15 SUSPENSION INTRAMUSCULAR at 16:45

## 2023-10-04 RX ADMIN — INSULIN DETEMIR 36 UNITS: 100 INJECTION, SOLUTION SUBCUTANEOUS at 21:29

## 2023-10-04 RX ADMIN — B-COMPLEX W/ C & FOLIC ACID TAB 1 TABLET: TAB at 08:09

## 2023-10-04 RX ADMIN — INSULIN LISPRO 2 UNITS: 100 INJECTION, SOLUTION INTRAVENOUS; SUBCUTANEOUS at 16:43

## 2023-10-04 RX ADMIN — DOXEPIN HYDROCHLORIDE 10 MG: 10 CAPSULE ORAL at 21:29

## 2023-10-04 RX ADMIN — Medication 40 MG: at 10:13

## 2023-10-04 RX ADMIN — HYDROXYZINE HYDROCHLORIDE 25 MG: 25 TABLET ORAL at 21:34

## 2023-10-04 NOTE — ED NOTES
See previous [facility] Suicide Risk Assessment. Re-screening not required unless change in behavior or suicidal ideation. Behavioral Health Assessment deferred as patient is sleeping and would benefit from additional rest.  Vital signs deferred until patient awake, no signs or symptoms of respiratory distress at this time. Once patient is awake and able to participate, will complete assessments.        Joshua Sandhoff, RN  10/04/23 5778

## 2023-10-04 NOTE — ED NOTES
201 not signed. Intake 201 not signed [facility]. Jose obtained signatures for the pt and attending Ricardo Guerra. Pt requested to wait for in network.

## 2023-10-04 NOTE — ED NOTES
Insurance Authorization for admission:   Phone call placed to Stuart Cheatham number: [phone]. Spoke to [place]    3 days approved.   Level of care: 201/IP  Review on 10/6/2023  Authorization # [id number]

## 2023-10-04 NOTE — ED NOTES
See previous [facility] Suicide Risk Assessment. Re-screening not required unless change in behavior or suicidal ideation. Behavioral Health Assessment deferred as patient is sleeping and would benefit from additional rest.  Vital signs deferred until patient awake, no signs or symptoms of respiratory distress at this time. Once patient is awake and able to participate, will complete assessments.       [place], [place]  10/04/23 5802

## 2023-10-04 NOTE — ED CARE HANDOFF
Emergency Department Sign Out Note        Sign out and transfer of care from Dr. Linda Melo See Separate Emergency Department note. The patient, Huy Campoverde, was evaluated by the previous provider for psych eval.    Workup Completed:  Psych clearance labs    ED Course / Workup Pending (followup): Patient medically cleared by previous provider, patient pending bed placement      No issues overnight, continue home medications                                     Procedures  MDM        Disposition  Final diagnoses:   Depression with suicidal ideation   Obesity   DM (diabetes mellitus) ([address])     Time reflects when diagnosis was documented in both MDM as applicable and the Disposition within this note     Time User Action Codes Description Comment    10/3/2023 12:32 AM Rosemary Incki Add [R45.851] Suicidal ideations     10/3/2023 12:32 AM Rosemary Nicki Remove [R45.851] Suicidal ideations     10/3/2023 12:32 AM Rosemary Nicki Add Feroz.Kristina. A,  R45.851] Depression with suicidal ideation     10/3/2023 12:32 AM Rosemary Nicki Add [E66.9] Obesity     10/3/2023 12:32 AM Rosemary Nicki Add [E11.9] DM (diabetes mellitus) [facility])       ED Disposition     None      MD Documentation    Lenin Howard Most Recent Value   Sending MD Dr Rocío Tee      Follow-up Information    None       Patient's Medications   Discharge Prescriptions    No medications on file     No discharge procedures on file.        ED Provider  Electronically Signed by     Nigel Valiente MD  10/04/23 3761

## 2023-10-04 NOTE — PLAN OF CARE
Problem: Ineffective Coping  Goal: Cooperates with admission process  Description: Interventions:   - Complete admission process  Outcome: Progressing  Goal: Identifies ineffective coping skills  Outcome: Progressing  Goal: Identifies healthy coping skills  Outcome: Progressing  Goal: Demonstrates healthy coping skills  Outcome: Progressing  Goal: Participates in unit activities  Description: Interventions:  - Provide therapeutic environment   - Provide required programming   - Redirect inappropriate behaviors   Outcome: Progressing  Goal: Patient/Family participate in treatment and DC plans  Description: Interventions:  - Provide therapeutic environment  Outcome: Progressing  Goal: Patient/Family verbalizes awareness of resources  Outcome: Progressing  Goal: Understands least restrictive measures  Description: Interventions:  - Utilize least restrictive behavior  Outcome: Progressing  Goal: Free from restraint events  Description: - Utilize least restrictive measures   - Provide behavioral interventions   - Redirect inappropriate behaviors   Outcome: Progressing     Problem: Risk for Self Injury/Neglect  Goal: Treatment Goal: Remain safe during length of stay, learn and adopt new coping skills, and be free of self-injurious ideation, impulses and acts at the time of discharge  Outcome: Progressing  Goal: Verbalize thoughts and feelings  Description: Interventions:  - Assess and re-assess patient's lethality and potential for self-injury  - Engage patient in 1:1 interactions, daily, for a minimum of 15 minutes  - Encourage patient to express feelings, fears, frustrations, hopes  - Establish rapport/trust with patient   Outcome: Progressing  Goal: Refrain from harming self  Description: Interventions:  - Monitor patient closely, per order  - Develop a trusting relationship  - Supervise medication ingestion, monitor effects and side effects   Outcome: Progressing  Goal: Attend and participate in unit activities, including therapeutic, recreational, and educational groups  Description: Interventions:  - Provide therapeutic and educational activities daily, encourage attendance and participation, and document same in the medical record  - Obtain collateral information, encourage visitation and family involvement in care   Outcome: Progressing  Goal: Recognize maladaptive responses and adopt new coping mechanisms  Outcome: Progressing  Goal: Complete daily ADLs, including personal hygiene independently, as able  Description: Interventions:  - Observe, teach, and assist patient with ADLS  - Monitor and promote a balance of rest/activity, with adequate nutrition and elimination  Outcome: Progressing     Problem: Depression  Goal: Treatment Goal: Demonstrate behavioral control of depressive symptoms, verbalize feelings of improved mood/affect, and adopt new coping skills prior to discharge  Outcome: Progressing  Goal: Verbalize thoughts and feelings  Description: Interventions:  - Assess and re-assess patient's level of risk   - Engage patient in 1:1 interactions, daily, for a minimum of 15 minutes   - Encourage patient to express feelings, fears, frustrations, hopes   Outcome: Progressing  Goal: Refrain from harming self  Description: Interventions:  - Monitor patient closely, per order   - Supervise medication ingestion, monitor effects and side effects   Outcome: Progressing  Goal: Refrain from isolation  Description: Interventions:  - Develop a trusting relationship   - Encourage socialization   Outcome: Progressing  Goal: Refrain from self-neglect  Outcome: Progressing  Goal: Attend and participate in unit activities, including therapeutic, recreational, and educational groups  Description: Interventions:  - Provide therapeutic and educational activities daily, encourage attendance and participation, and document same in the medical record   Outcome: Progressing  Goal: Complete daily ADLs, including personal hygiene independently, as able  Description: Interventions:  - Observe, teach, and assist patient with ADLS  -  Monitor and promote a balance of rest/activity, with adequate nutrition and elimination   Outcome: Progressing     Problem: Anxiety  Goal: Anxiety is at manageable level  Description: Interventions:  - Assess and monitor patient's anxiety level. - Monitor for signs and symptoms (heart palpitations, chest pain, shortness of breath, headaches, nausea, feeling jumpy, restlessness, irritable, apprehensive). - Collaborate with interdisciplinary team and initiate plan and interventions as ordered.   - Adah patient to unit/surroundings  - Explain treatment plan  - Encourage participation in care  - Encourage verbalization of concerns/fears  - Identify coping mechanisms  - Assist in developing anxiety-reducing skills  - Administer/offer alternative therapies  - Limit or eliminate stimulants  Outcome: Progressing

## 2023-10-04 NOTE — ED NOTES
Patient is accepted at [facility]  Patient is accepted by Dr. Jayla Chen per [address] is arranged with [employer]. Transportation is TBD        Nurse report is to be called to [phone] prior to patient transfer.

## 2023-10-04 NOTE — H&P
Bobbi Desai#  [id number]  THERESE:[id number]    VSS:[id number]  Adm Date: 10/4/2023 1433  2:33 PM   ATT PHY: Rodney Otero, [facility]         Chief Complaint: Worsening depression and anxiety    History of Presenting Illness: Mayur Redding is a(n) 50y.o. year old female was admitted through ED with worsening depression and anxiety along with a plan to kill herself using her gun or overdosing herself with her medications. Patient examined at bedside. Patient denied any active suicidal homicidal ideations. Allergies   Allergen Reactions   • Hydrocodone Other (See Comments) and Shortness Of Breath     Chest tightness   • Morphine Other (See Comments)     reports " I flip out"  Many years ago and had a very violent outburst,,,"tore phone off wall and siderails off the bed and can't remember doing it"   • Oxycodone Other (See Comments) and Shortness Of Breath     Chest tightness   • Penicillins Rash   • Percocet [Oxycodone-Acetaminophen] Shortness Of Breath and Other (See Comments)     reports "chest tightness"  Darvocet and Vicodin also    • Propoxyphene Other (See Comments) and Shortness Of Breath     Chest tightness   • Vancomycin Other (See Comments)     "shuts down my kidney's"   • Acetaminophen Other (See Comments)     chest tightness   • Aspirin Other (See Comments)     Told by a doctor not to take due to kidney problem in the past   • Heparin Other (See Comments)     States cant take strong blood thinners   • Lasix [Furosemide]      Pt reports also told by her doctor to not take lasix due to kidneys   • Morphine Other (See Comments)     aggression   • Nsaids Other (See Comments)     Told by a doctor to not take due to kidney problem in the past  Told by a doctor to not take due to kidney problem in the past   • Vancomycin Other (See Comments)     .        Current Facility-Administered Medications on File Prior to Encounter   Medication Dose Route Frequency Provider Last Rate Last Admin   • [COMPLETED] haloperidol lactate (HALDOL) injection 5 mg  5 mg Intramuscular Once Ha Quinteros MD   5 mg at 10/03/23 2304   • [COMPLETED] LORazepam (ATIVAN) injection 2 mg  2 mg Intramuscular Once Ha MD Aldair   2 mg at 10/03/23 2305   • [DISCONTINUED] atorvastatin (LIPITOR) tablet 10 mg  10 mg Oral Daily With [address] III, DO   10 mg at 10/03/23 1911   • [DISCONTINUED] busPIRone (BUSPAR) tablet 10 mg  10 mg Oral BID Cierra Seeds III, DO   10 mg at 10/04/23 4493   • [DISCONTINUED] doxepin (SINEquan) capsule 10 mg  10 mg Oral HS Cierra Seeds III, DO   10 mg at 10/03/23 2117   • [DISCONTINUED] Empagliflozin TABS 25 mg  25 mg Oral QAM Cierra Seeds III, DO   25 mg at 10/04/23 2174   • [DISCONTINUED] escitalopram (LEXAPRO) tablet 15 mg  15 mg Oral Daily Cierra Seeds III, DO   15 mg at 10/04/23 0809   • [DISCONTINUED] fluticasone (FLONASE) 50 mcg/act nasal spray 1 spray  1 spray Nasal Daily Cierra Seeds III, DO   1 spray at 10/04/23 0809   • [DISCONTINUED] insulin detemir (LEVEMIR) subcutaneous injection 36 Units  36 Units Subcutaneous HS Cierra Seeds III, DO   36 Units at 10/03/23 2215   • [DISCONTINUED] insulin lispro (HumaLOG) 100 units/mL subcutaneous injection 2-12 Units  2-12 Units Subcutaneous TID [facility] Cristian Hebert MD   2 Units at 10/03/23 1331   • [DISCONTINUED] insulin lispro (HumaLOG) 100 units/mL subcutaneous injection 2-12 Units  2-12 Units Subcutaneous TID AC Wilmer Stroud DO       • [DISCONTINUED] insulin lispro (HumaLOG) 100 units/mL subcutaneous injection 2-12 Units  2-12 Units Subcutaneous HS Kena Torrez DO       • [DISCONTINUED] multivitamin stress formula tablet 1 tablet  1 tablet Oral Daily Cierra Seeds III, DO   1 tablet at 10/04/23 0809   • [DISCONTINUED] omeprazole (PRILOSEC) suspension 2 mg/mL  40 mg Oral Daily Tinnrodriguez Leticia Diogenes III, DO   40 mg at 10/04/23 1013   • [DISCONTINUED] phenazopyridine (PYRIDIUM) tablet 100 mg  100 mg Oral TID PRN Judson Sellers III, DO   100 mg at 10/03/23 9809     Current Outpatient Medications on File Prior to Encounter   Medication Sig Dispense Refill   • acetaminophen (TYLENOL) 500 mg tablet Take 500 mg by mouth every 6 (six) hours as needed for mild pain     • albuterol (PROVENTIL HFA,VENTOLIN HFA) 90 mcg/act inhaler inhale 2 puffs by mouth and INTO THE LUNGS every 4 hours if needed for wheezing 18 g 2   • atorvastatin (LIPITOR) 10 mg tablet Take 1 tablet (10 mg total) by mouth daily 90 tablet 3   • B-D UF III MINI PEN NEEDLES 31G X 5 MM MISC Use to inject insulin 4 times daily in case of pump failure. 300 each 1   • busPIRone (BUSPAR) 10 mg tablet Take 1 tablet (10 mg total) by mouth 2 (two) times a day 60 tablet 3   • Continuous Blood Gluc  (FREESTYLE ZHAO READER) ORIN 1 Device by Does not apply route 4 (four) times a day 1 Device 0   • Continuous Blood Gluc Sensor (FreeStyle Zhao Sensor System) [facility] Use one sensor every 14 days for continuous glucose monitoring.  6 each 1   • doxepin (SINEquan) 10 mg capsule TAKE 1 CAPSULE (10 MG TOTAL) BY MOUTH DAILY AT BEDTIME 30 capsule 1   • escitalopram (LEXAPRO) 10 mg tablet take 1 AND 1/2 tablets by mouth daily 45 tablet 6   • etonogestrel (NEXPLANON) subdermal implant Inject under the skin     • fluticasone (FLONASE) 50 mcg/act nasal spray 1 spray into each nostril daily 1 Bottle 2   • glucose blood (FREESTYLE TEST STRIPS) test strip Use to check blood sugar four times a day in case of CGM failure 120 strip 3   • insulin detemir (Levemir FlexTouch) 100 Units/mL injection pen Inject 36 Units under the skin daily at bedtime 30 mL 1   • Insulin Disposable Pump (Omnipod DASH Pods, Gen 4,) MISC Use 1 Cartridge every third day 45 each 1   • Jardiance 25 MG TABS TAKE 1 TABLET (25 MG TOTAL) BY MOUTH EVERY MORNING 90 tablet 1   • multivitamin (THERAGRAN) TABS Take 1 tablet by mouth daily     • NovoLOG 100 UNIT/ML injection INJECT 300 UNITS INTO OMNIPOD EVERY 72 HOURS 90 mL 1   • NovoLOG FlexPen 100 units/mL injection pen INJECT 12-14 UNITS PRIOR TO EACH MEAL 3 TIMES A DAY INCASE OF PUMP FAILURE 15 mL 1   • omeprazole (PriLOSEC) 40 MG capsule Take 1 capsule (40 mg total) by mouth daily 90 capsule 3   • phenazopyridine (PYRIDIUM) 100 mg tablet Take 1 tablet (100 mg total) by mouth 3 (three) times a day as needed for bladder spasms 10 tablet 0   • polyethylene glycol (GOLYTELY) 4000 mL solution Take 4,000 mL by mouth once for 1 dose 4000 mL 0   • semaglutide, 1 mg/dose, (Ozempic, 1 MG/DOSE,) 4 mg/3 mL injection pen Inject under the skin 1mg weekly 9 mL 1       Active Ambulatory Problems     Diagnosis Date Noted   • S/P dilatation and curettage 12/19/2017   • Other specified abnormal uterine and vaginal bleeding 12/19/2017   • Septate uterus 02/19/2018   • Type 2 diabetes mellitus with hyperglycemia, with long-term current use of insulin ([address]) 07/20/2016   • History of DVT (deep vein thrombosis) 02/19/2018   • History of TIA (transient ischemic attack) 02/19/2018   • Morbid obesity ([address]) 02/19/2018   • Smoker 02/19/2018   • Nexplanon insertion 03/15/2018   • Abnormal uterine bleeding 03/15/2018   • Essential hypertension 07/03/2018   • 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   • Encounter for smoking cessation counseling 03/27/2020   • AURELIO (obstructive sleep apnea)    • 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) 08/25/2020   • Bell's palsy 03/29/2021   • Mild persistent asthma without complication 11/88/8842   • Gastroesophageal reflux disease without esophagitis 03/29/2021   • Cellulitis of skin of back 07/06/2023     Resolved Ambulatory Problems     Diagnosis Date Noted   • No Resolved Ambulatory Problems     Past Medical History:   Diagnosis Date   • Acid reflux    • Anemia    • Anxiety    • Asthma    • Blind left eye    • Broken tooth • Cancer ([address])    • Depression    • Diabetes mellitus ([address])    • Eye cancer, left ([address])    • Genital warts    • Heavy menses    • History of cellulitis    • History of foot surgery    • History of pneumonia    • History of radiation therapy    • History of sore throat    • Kidney failure    • PONV (postoperative nausea and vomiting)    • Risk for falls    • Teeth missing    • TIA (transient ischemic attack)    • Tobacco abuse    • Uveal melanoma, anterior, unspecified laterality (HCC)    • Varicose vein of leg    • Wears glasses        Past Surgical History:   Procedure Laterality Date   •  SECTION     •  SECTION     • DILATION AND CURETTAGE OF UTERUS N/A 2017    Procedure: DILATATION AND CURETTAGE (D&C); Surgeon: Blaise Babinski, MD;  Location: [facility];  Service: Gynecology   • EYE SURGERY Left     and also "goes to [facility] 4 months for injections" left eye   • HYSTEROSCOPY N/A 2017    Procedure: HYSTEROSCOPY;  Surgeon: Blaise Babinski, MD;  Location: [facility] OR;  Service: Gynecology   • MYRINGOTOMY W/ TUBES Bilateral    • TONSILLECTOMY     • TUBAL LIGATION     • WISDOM TOOTH EXTRACTION         Social History:   Social History     Socioeconomic History   • Marital status: Single     Spouse name: None   • Number of children: None   • Years of education: None   • Highest education level: None   Occupational History   • None   Tobacco Use   • Smoking status: Every Day     Packs/day: 0.50     Years: 31.00     Total pack years: 15.50     Types: Cigarettes   • Smokeless tobacco: Never   Vaping Use   • Vaping Use: Never used   Substance and Sexual Activity   • Alcohol use: Not Currently     Alcohol/week: 1.0 standard drink of alcohol     Types: 1 Cans of beer per week     Comment: rarely   • Drug use: Not Currently     Types: Cocaine, Marijuana     Comment: Patient has abstained for the past 25 years.    • Sexual activity: Not Currently     Partners: Male     Birth control/protection: Female Sterilization, I.U.D. Comment: IUD 2018   Other Topics Concern   • None   Social History Narrative    ** Merged History Encounter **          Social Determinants of Health     Financial Resource Strain: Low Risk  (1/20/2023)    Overall Financial Resource Strain (CARDIA)    • Difficulty of Paying Living Expenses: Not hard at all   Food Insecurity: Not on file   Transportation Needs: No Transportation Needs (1/20/2023)    PRAPARE - Transportation    • Lack of Transportation (Medical): No    • Lack of Transportation (Non-Medical): No   Physical Activity: Not on file   Stress: Not on file   Social Connections: Not on file   Intimate Partner Violence: Not on file   Housing Stability: Not on file       Family History:   Family History   Problem Relation Age of Onset   • Cancer Mother    • Breast cancer Mother 40   • Diabetes Mother    • Sleep apnea Mother    • Heart disease Father    • Heart attack Father    • Stroke Father    • Coronary artery disease Father    • Hypertension Father    • Diabetes Sister    • No Known Problems Maternal Grandmother    • No Known Problems Paternal Grandmother    • No Known Problems Maternal Aunt    • No Known Problems Maternal Aunt    • No Known Problems Maternal Aunt    • No Known Problems Maternal Aunt    • No Known Problems Maternal Aunt    • No Known Problems Maternal Aunt        Review of Systems   Musculoskeletal: Positive for arthralgias. Neurological: Positive for headaches. All other systems reviewed and are negative. Physical Exam   Vitals: Blood pressure 128/80, pulse 72, temperature 97.5 °F (36.4 °C), temperature source Temporal, resp. rate 19, SpO2 94 %. ,There is no height or weight on file to calculate BMI. Constitutional: Awake and Alert. Well-developed and well-nourished. No distress. HENT: PERR,EOMI, conjunctiva normal  Head: Normocephalic and atraumatic.    Mouth/Throat: Oropharynx is clear and moist.    Eyes: Conjunctivae and EOM are normal. Pupils are equal, round, and reactive to light. Right and left eye exhibits no discharge. Neck: Neck supple. No tracheal deviation present. No thyromegaly present. Cardiovascular: Normal rate, regular rhythm and normal heart sounds. Exam reveals no friction rub. No murmur heard. Pulmonary/Chest: Effort normal and breath sounds normal. No respiratory distress. She has no wheezes. Abdominal: Soft. Bowel sounds are normal. She exhibits no distension. There is no tenderness. There is no rebound and no guarding. Neurological: Cranial Nerves grossly intact. No sensory deficit. Coordination normal.   Musculoskeletal:   Nontender spine  Skin: Skin is warm and dry. No rash noted. No diaphoresis. No erythema. No edema. No cyanosis. Assessment     Eloisa Ram is a(n) 50y.o. year old female with MDD    1. Cardiac with history of hypertension and dyslipidemia. Patient is on Lipitor 10 mg daily. I will put the patient on lisinopril 2.5 mg daily. 2.  Type 2 diabetes mellitus requiring insulin. Patient was on insulin pump. In the unit we will use Levemir 36 units at bedtime along with Humalog insulin sliding scale coverage 4 times daily with Accu-Cheks. It also gets Jardiance 25 mg daily and Ozempic 1 mg subcutaneously weekly. Patient will be put on carb controlled diet. Patient's last hemoglobin A1c was 6.8 on 6/21/2023.  3.  Allergic Rhinitis. Patient is on Flonase nasal spray. 4.  GERD. Patient is on Protonix 40 mg daily. 5.  Tobacco abuse. Patient will be put on nicotine transdermal patch 21 mg daily along with nicotine gum as needed. 6.  History of Asthma. Patient may get albuterol inhaler as needed. 7.  Arthralgia/headache. Patient may get Tylenol as needed. 8.  Psych with MDD. Patient is being managed by psych. Prognosis: Fair. Discharge Plan: In progress. Advanced Directives: I have discussed in detail the patient the advanced directives.   Patient has not appointed anybody as her POA and has no living will with advanced healthcare directives. Patient's first contact is her  Marta Leggett and his phone number is [phone]. When discussing cardiac and pulmonary resuscitation efforts with the patient, the patient wishes to be FULL CODE. I have spent more than 50 minutes gathering data, doing physical examination, and discussing the advanced directives, which was witnessed by caring staff.

## 2023-10-04 NOTE — PROGRESS NOTES
Patient Belongings:     In Patient Room:  - eyeglasses  - blue underwear (1)  - black tshirt (1)    Patient Cabinet:  - white sneakers (with laces)  - misc papers  - deodorant  - blue shorts (with string)  - white socks (1)  - red purse    Contraband:  - blue glucometer  - small back (with dogs on it)  - pack of cigarettes (12 count)  - cell phone  - glucometer cord  - cell phone cord  - omnipod    Security: #[id number]  - misc cards  - cash $11.00  - coin $1.16  - 's license

## 2023-10-04 NOTE — NURSING NOTE
Patient admitted to unit via transport by wheelchair. Patient is alert and oriented x 4, verbalizes needs. Patient came to ED, agitated, with SI to shoot self in the head due to the potential of being homeless. Upon admit, patient was pleasant and cooperative in interaction, periods of cursing due to her SSD benefits being stopped. Affect flat. Per patient, "I had a breakdown", "Seven years ago I was diagnosed with melanoma of my left eye". "Then I received SSI and now they want to take it away". "I was told I only have 5-15 years to live, so how am I better?" "How am I going to have medical insurance?" "Previously I lived on the streets, I'd shoot myself in the head before we live on the streets". "I'd make sure my  can collect". Patient expressed financial concerns, support provided. Patient stated she is safe while in hospital, would come to staff if she felt like harming herself however does not feel safe outside the hospital. Patient stated the Police took her one gun at the ED but has access to firearms at home since  is a vane. Safeguards to inhibit SI include her son, , and her dogs. Patient oriented to room and unit. Skin check performed. Noted multiple scars on abdomen where patient had gallbladder removed in August. Skin under breasts reddened. No open areas noted upon assessment. CSSRS-Lifetime and CSSRS-Daily low. No previous suicide attempts per patient. Patient did state most of her pain medication allergies are marked as allergies due to previous drug and alcohol addiction 28 years ago. No glycemic reactions. Remains medication compliant and on 7" checks for safety and behaviors.

## 2023-10-04 NOTE — ED NOTES
Insurance Authorization for admission:   Phone call placed to [employer]. Phone number: [phone]. Spoke to [place]. ** days approved. Level of care: [address] Review on **. Authorization # **.        [facility] Auth Form to be faxed to [phone] with supporting clinicals.
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
## 2023-10-05 LAB
25(OH)D3 SERPL-MCNC: 21.4 NG/ML (ref 30–100)
EST. AVERAGE GLUCOSE BLD GHB EST-MCNC: 146 MG/DL
FOLATE SERPL-MCNC: 13.9 NG/ML
GLUCOSE SERPL-MCNC: 116 MG/DL (ref 65–140)
GLUCOSE SERPL-MCNC: 144 MG/DL (ref 65–140)
GLUCOSE SERPL-MCNC: 163 MG/DL (ref 65–140)
GLUCOSE SERPL-MCNC: 169 MG/DL (ref 65–140)
HBA1C MFR BLD: 6.7 %
VIT B12 SERPL-MCNC: 381 PG/ML (ref 180–914)

## 2023-10-05 PROCEDURE — 82607 VITAMIN B-12: CPT | Performed by: FAMILY MEDICINE

## 2023-10-05 PROCEDURE — 82948 REAGENT STRIP/BLOOD GLUCOSE: CPT

## 2023-10-05 PROCEDURE — 82746 ASSAY OF FOLIC ACID SERUM: CPT | Performed by: FAMILY MEDICINE

## 2023-10-05 PROCEDURE — 82306 VITAMIN D 25 HYDROXY: CPT | Performed by: FAMILY MEDICINE

## 2023-10-05 PROCEDURE — 99223 1ST HOSP IP/OBS HIGH 75: CPT | Performed by: PSYCHIATRY & NEUROLOGY

## 2023-10-05 PROCEDURE — 83036 HEMOGLOBIN GLYCOSYLATED A1C: CPT | Performed by: FAMILY MEDICINE

## 2023-10-05 RX ORDER — VENLAFAXINE HYDROCHLORIDE 75 MG/1
75 CAPSULE, EXTENDED RELEASE ORAL DAILY
Status: DISCONTINUED | OUTPATIENT
Start: 2023-10-05 | End: 2023-10-06

## 2023-10-05 RX ORDER — GABAPENTIN 100 MG/1
100 CAPSULE ORAL 2 TIMES DAILY
Status: DISCONTINUED | OUTPATIENT
Start: 2023-10-05 | End: 2023-10-06

## 2023-10-05 RX ORDER — NYSTATIN 100000 [USP'U]/G
POWDER TOPICAL 2 TIMES DAILY
Status: DISCONTINUED | OUTPATIENT
Start: 2023-10-05 | End: 2023-10-12 | Stop reason: HOSPADM

## 2023-10-05 RX ORDER — DOXEPIN HYDROCHLORIDE 25 MG/1
25 CAPSULE ORAL
Status: DISCONTINUED | OUTPATIENT
Start: 2023-10-05 | End: 2023-10-10

## 2023-10-05 RX ORDER — INSULIN LISPRO 100 [IU]/ML
2-12 INJECTION, SOLUTION INTRAVENOUS; SUBCUTANEOUS
Status: DISCONTINUED | OUTPATIENT
Start: 2023-10-05 | End: 2023-10-12 | Stop reason: HOSPADM

## 2023-10-05 RX ORDER — HYDROXYZINE 50 MG/1
50 TABLET, FILM COATED ORAL
Status: DISCONTINUED | OUTPATIENT
Start: 2023-10-05 | End: 2023-10-12 | Stop reason: HOSPADM

## 2023-10-05 RX ADMIN — HYDROXYZINE HYDROCHLORIDE 50 MG: 50 TABLET, FILM COATED ORAL at 15:36

## 2023-10-05 RX ADMIN — BUSPIRONE HYDROCHLORIDE 10 MG: 10 TABLET ORAL at 08:18

## 2023-10-05 RX ADMIN — EMPAGLIFLOZIN 25 MG: 25 TABLET, FILM COATED ORAL at 12:14

## 2023-10-05 RX ADMIN — PANTOPRAZOLE SODIUM 40 MG: 40 TABLET, DELAYED RELEASE ORAL at 05:54

## 2023-10-05 RX ADMIN — INSULIN LISPRO 2 UNITS: 100 INJECTION, SOLUTION INTRAVENOUS; SUBCUTANEOUS at 08:21

## 2023-10-05 RX ADMIN — NYSTATIN 1 APPLICATION: 100000 POWDER TOPICAL at 17:07

## 2023-10-05 RX ADMIN — NICOTINE 1 PATCH: 21 PATCH, EXTENDED RELEASE TRANSDERMAL at 08:19

## 2023-10-05 RX ADMIN — ESCITALOPRAM OXALATE 15 MG: 5 TABLET, FILM COATED ORAL at 08:18

## 2023-10-05 RX ADMIN — FLUTICASONE PROPIONATE 1 SPRAY: 50 SPRAY, METERED NASAL at 08:22

## 2023-10-05 RX ADMIN — Medication 3 MG: at 23:03

## 2023-10-05 RX ADMIN — VENLAFAXINE HYDROCHLORIDE 75 MG: 75 CAPSULE, EXTENDED RELEASE ORAL at 12:14

## 2023-10-05 RX ADMIN — GABAPENTIN 100 MG: 100 CAPSULE ORAL at 17:08

## 2023-10-05 RX ADMIN — INSULIN DETEMIR 36 UNITS: 100 INJECTION, SOLUTION SUBCUTANEOUS at 21:29

## 2023-10-05 RX ADMIN — INSULIN LISPRO 2 UNITS: 100 INJECTION, SOLUTION INTRAVENOUS; SUBCUTANEOUS at 12:15

## 2023-10-05 RX ADMIN — B-COMPLEX W/ C & FOLIC ACID TAB 1 TABLET: TAB at 08:21

## 2023-10-05 RX ADMIN — DOXEPIN HYDROCHLORIDE 25 MG: 25 CAPSULE ORAL at 21:30

## 2023-10-05 RX ADMIN — ATORVASTATIN CALCIUM 10 MG: 10 TABLET, FILM COATED ORAL at 17:08

## 2023-10-05 NOTE — NURSING NOTE
No agitation or suicidal ideations overnight. Patient observed sleeping during shift. No acute behaviors noted. No change in medical condition. Maintained on q 7 minute safety checks. Will continue to monitor.

## 2023-10-05 NOTE — NURSING NOTE
Pt medicated for c/o increased anxiety @ 1536 with Atarax 50 mg po with relief obtained. Ammy Hy - 16 @ that time. Q 7 min checks maintained to monitor pt's behavior & safety.

## 2023-10-05 NOTE — PROGRESS NOTES
Progress Note - Elyse Hong 50 y.o. female MRN: [id number]    Unit/Bed#: [facility] 201-01 Encounter: [id number]        Subjective:   Patient seen and examined at bedside after reviewing the chart and discussing the case with the caring staff. Patient examined at bedside. Patient complaining of a rash under her bilateral breast.    Physical Exam   Vitals: Blood pressure 107/54, pulse 75, temperature 98.1 °F (36.7 °C), temperature source Temporal, resp. rate 18, height 5' 6" (1.676 m), weight 134 kg (294 lb 6.4 oz), SpO2 96 %. ,Body mass index is 47.52 kg/m². Constitutional: Patient appears well-developed. HEENT: PERR, EOMI, MMM. Cardiovascular: Normal rate and regular rhythm. Pulmonary/Chest: Effort normal and breath sounds normal.   Abdomen: Soft, + BS, NT. Assessment/Plan:  Elyse Hong is a(n) 50y.o. year old female with MDD.     1.  Cardiac with history of HTN, dyslipidemia. Continue atorvastatin 10 mg daily. Lisinopril 2.5 mg discontinued as patient refusing. 2.  Type 2 diabetes mellitus requiring insulin. Patient using insulin pump at home. On unit will use Levemir 36 units at bedtime. Humalog insulin sliding scale coverage 4 times daily with accu-cheks ac/hs. Continue Jardiance 25 mg daily and Ozempic 1 mg subcutaneously weekly. Carb controlled diet. Hgb A1c 6.8% on 6/21/2023. Will reach out to endocrinology if needed for insulin dosing recommendations. 3.  Allergic rhinitis. Patient is on Flonase nasal spray. 4.  GERD. Patient is on Protonix 40 mg daily. 5.  Tobacco abuse. NRT. 6.  Asthma. Stable. Albuterol inhaler as needed. 7. Intertrigo. Start nystatin powder twice daily. The patient was discussed with Dr. Valerie Ewing and he is in agreement with the above note.

## 2023-10-05 NOTE — NURSING NOTE
Lisinopril held this AM due to BP - 107/54. Pt denies any hallucinations, suicidal or homicidal ideations. Pt c/o mild depression & mild anxiety. Pt is pleasant & socializes with staff & other patients. Pt is cooperative & compliant with medications. Pt up ad yoav & gait is steady. Q 7 min checks maintained to monitor pt's behavior & safety.
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
## 2023-10-05 NOTE — PROGRESS NOTES
10/05/23 1100   Team Meeting   Meeting Type Tx Team Meeting   Team Members Present   Team Members Present Physician;Nurse;   Physician Team Member Dr. Sohail Hinton MD   Nursing Team Member Torrie Berkowitz, RN   Care Management Team Member Ludwin Reilly MS, [facility] [place] [facility], [facility]   Patient/Family Present   Patient Present Yes   Patient's Family Present No     PT met with [facility] team, reviewed [place] plan and goals, all in agreement and signed.

## 2023-10-05 NOTE — NUTRITION
10/05/23 1405   Biochemical Data,Medical Tests, and Procedures   Biochemical Data/Medical Tests/Procedures Lab values reviewed; Meds reviewed   Labs (Comment) 10/2 CMP WNL, RBC:5.32, Hct:47.3. 6/21 A1c:6.8%   Meds (Comment) lipitor, cogentin, lexapro, haldol, levemir, humalog, zestril, ativan, MVI, melatonin, protonix, venlafaxine   Nutrition-Focused Physical Exam   Nutrition-Focused Physical Exam Findings RN skin assessment reviewed; No skin issues documented   Nutrition-Focused Physical Exam Findings Smoker   Medical-Related Concerns acid reflux, anxiety, anemia, DM, depression, morbid obesity, TIA   Adequacy of Intake   Nutrition Modality PO   Feeding Route   PO Independent   Current PO Intake   Current Diet Order CCD 2 diet thin liquids   Current Meal Intake %   Estimated calorie intake compared to estimated need Nutrient needs met. PES Statement   Problem Clinical   Weight (3) Overweight/obesity NC-3.3   Overweight/ obesity specific to Obese, Class III (5)   Related to Energy intake>energy output over time   As evidenced by: BMI   Recommendations/Interventions   Malnutrition/BMI Present Yes  (energy intake > energy output over time)   Adult BMI Classifications Morbid Obesity 45-49.9   Summary 14-23#; MST-2. High BMI. CCD 2 diet thin liquids. Meal completions 100%. Reports her appetite is okay. Reports avoiding candy and consumes sweets in moderation. States she consumes 2 meals per day. Reports skipping breakfast. Cooks for herself. Utilizes [employer] to manage BG at home. Reports her BG often ranges 75-120mg/dL. 10/4/#; 7/25/#, 17#(5%) weight loss; 1/20/#. No significant weight changes. Reports recent weight loss was related to a gallbaldder surgery in August 2023. She states she is happy about her weight loss and would like to lose more weight. Skin intact. Current diet explained. Patient states she follows with Endocrinology in [facility].    Interventions/Recommendations Continue current diet order   Education Assessment   Education Patient declined nutrition education   Patient Nutrition Goals   Goal Avoid weight gain;Glucose WNL   Goal Status Initiated   Timeframe to complete goal by next f/u   Nutrition Complexity Risk   Nutrition complexity level Low risk   Follow up date 10/19/23

## 2023-10-05 NOTE — PLAN OF CARE
Problem: Risk for Self Injury/Neglect  Goal: Treatment Goal: Remain safe during length of stay, learn and adopt new coping skills, and be free of self-injurious ideation, impulses and acts at the time of discharge  Outcome: Progressing  Goal: Verbalize thoughts and feelings  Description: Interventions:  - Assess and re-assess patient's lethality and potential for self-injury  - Engage patient in 1:1 interactions, daily, for a minimum of 15 minutes  - Encourage patient to express feelings, fears, frustrations, hopes  - Establish rapport/trust with patient   Outcome: Progressing  Goal: Refrain from harming self  Description: Interventions:  - Monitor patient closely, per order  - Develop a trusting relationship  - Supervise medication ingestion, monitor effects and side effects   Outcome: Progressing  Goal: Attend and participate in unit activities, including therapeutic, recreational, and educational groups  Description: Interventions:  - Provide therapeutic and educational activities daily, encourage attendance and participation, and document same in the medical record  - Obtain collateral information, encourage visitation and family involvement in care   Outcome: Progressing  Goal: Recognize maladaptive responses and adopt new coping mechanisms  Outcome: Progressing  Goal: Complete daily ADLs, including personal hygiene independently, as able  Description: Interventions:  - Observe, teach, and assist patient with ADLS  - Monitor and promote a balance of rest/activity, with adequate nutrition and elimination  Outcome: Progressing     Problem: Depression  Goal: Treatment Goal: Demonstrate behavioral control of depressive symptoms, verbalize feelings of improved mood/affect, and adopt new coping skills prior to discharge  Outcome: Progressing  Goal: Verbalize thoughts and feelings  Description: Interventions:  - Assess and re-assess patient's level of risk   - Engage patient in 1:1 interactions, daily, for a minimum of 15 minutes   - Encourage patient to express feelings, fears, frustrations, hopes   Outcome: Progressing  Goal: Refrain from harming self  Description: Interventions:  - Monitor patient closely, per order   - Supervise medication ingestion, monitor effects and side effects   Outcome: Progressing  Goal: Refrain from isolation  Description: Interventions:  - Develop a trusting relationship   - Encourage socialization   Outcome: Progressing  Goal: Refrain from self-neglect  Outcome: Progressing  Goal: Attend and participate in unit activities, including therapeutic, recreational, and educational groups  Description: Interventions:  - Provide therapeutic and educational activities daily, encourage attendance and participation, and document same in the medical record   Outcome: Progressing  Goal: Complete daily ADLs, including personal hygiene independently, as able  Description: Interventions:  - Observe, teach, and assist patient with ADLS  -  Monitor and promote a balance of rest/activity, with adequate nutrition and elimination   Outcome: Progressing     Problem: Anxiety  Goal: Anxiety is at manageable level  Description: Interventions:  - Assess and monitor patient's anxiety level. - Monitor for signs and symptoms (heart palpitations, chest pain, shortness of breath, headaches, nausea, feeling jumpy, restlessness, irritable, apprehensive). - Collaborate with interdisciplinary team and initiate plan and interventions as ordered.   - [place] patient to unit/surroundings  - Explain treatment plan  - Encourage participation in care  - Encourage verbalization of concerns/fears  - Identify coping mechanisms  - Assist in developing anxiety-reducing skills  - Administer/offer alternative therapies  - Limit or eliminate stimulants  Outcome: Progressing

## 2023-10-05 NOTE — MALNUTRITION/BMI
This medical record reflects one or more clinical indicators suggestive of malnutrition and/or morbid obesity. BMI Findings:  Adult BMI Classifications: Morbid Obesity 45-49.9     Energy intake > energy output overtime. Body mass index is 47.52 kg/m². See Nutrition note dated 10/5/2023  for additional details. Completed nutrition assessment is viewable in the nutrition documentation.

## 2023-10-05 NOTE — TREATMENT PLAN
TREATMENT PLAN REVIEW - [address] 50 y.o. 1970 female MRN: [id number]    [address] [address] Room / Bed: Alexander Arce 785-87 Encounter: [id number]          Admit Date/Time:  10/4/2023  2:33 PM    Treatment Team: Attending Provider: Miguel Govea MD; Consulting Physician: Anne Love MD; Patient Care Assistant: Chavez Cuellar; Certified Nursing Assistant: Candy Ramirez; Patient Care Assistant: Joey You;  Recreational Therapist: Fly Stockton; Patient Care Assistant: Mellissa Baires; Patient Care Assistant: Mona Weber; : Ronaldo Melgar; Registered Nurse: Deni Chaney RN; Dietitian: Sanjuanita Ambrosio RD; Certified Nursing Assistant: Herbert Moreno; Registered Nurse: Sergio Price RN    Diagnosis: Active Problems:    Type 2 diabetes mellitus with hyperglycemia, with long-term current use of insulin ([address])    History of DVT (deep vein thrombosis)    History of TIA (transient ischemic attack)    Morbid obesity ([address])    Essential hypertension    AURELIO (obstructive sleep apnea)    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 ([address])    Bell's palsy    Mild persistent asthma without complication    Gastroesophageal reflux disease without esophagitis      Patient Strengths/Assets: cooperative, negotiates basic needs, patient is on a voluntary commitment    Patient Barriers/Limitations: difficulty adapting, poor physical health, self-care deficit    Short Term Goals: decrease in depressive symptoms, decrease in anxiety symptoms, decrease in suicidal thoughts, ability to stay safe on the unit, improvement in reasoning ability, improvement in self care, sleep improvement, improvement in appetite, mood stabilization, increase in socialization with peers on the unit, acceptance of need for psychiatric treatment, acceptance of psychiatric medications    Long Term Goals: improvement in depression, improvement in anxiety, stabilization of mood, free of suicidal thoughts, improvement in reasoning ability, improved insight, acceptance of need for psychiatric medications, acceptance of need for psychiatric treatment, acceptance of need for psychiatric follow up after discharge, acceptance of psychiatric diagnosis, adequate self care, adequate sleep, adequate appetite, adequate oral intake, appropriate interaction with peers    Progress Towards Goals: starting psychiatric medications as prescribed    Recommended Treatment: medication management, patient medication education, group therapy, milieu therapy, continued Behavioral Health psychiatric evaluation/assessment process, medical follow up with medical team    Treatment Frequency: daily medication monitoring, group and milieu therapy daily, monitoring through interdisciplinary rounds, monitoring through weekly patient care conferences    Expected Discharge Date: 10 midnights     Discharge Plan: will be determined    Treatment Plan Created/Updated By: Elder Erazo MD

## 2023-10-05 NOTE — NURSING NOTE
No cursing or yelling out this evening. Patient was compliant with medication regime. Attended and participated in unit activities (snack and television). Pt. refrained from self-harm, maintained appropriate behaviors, and had no suicidal ideations. Anxiety level is stated as moderate . Depression level is stated as moderate. Denies current homicidal ideations and/or audio or visual hallucinations. Safety maintained via clutter-free environment, ID band, and given non-skid socks. Fluids maintained at beside to promote hydrations. Medication education given. Care Plan reviewed and amended. Maintained on q 7 minute safety checks. Will continue to monitor.

## 2023-10-06 LAB
GLUCOSE SERPL-MCNC: 134 MG/DL (ref 65–140)
GLUCOSE SERPL-MCNC: 146 MG/DL (ref 65–140)
GLUCOSE SERPL-MCNC: 166 MG/DL (ref 65–140)
GLUCOSE SERPL-MCNC: 171 MG/DL (ref 65–140)

## 2023-10-06 PROCEDURE — 99233 SBSQ HOSP IP/OBS HIGH 50: CPT | Performed by: PSYCHIATRY & NEUROLOGY

## 2023-10-06 PROCEDURE — 82948 REAGENT STRIP/BLOOD GLUCOSE: CPT

## 2023-10-06 RX ORDER — VENLAFAXINE HYDROCHLORIDE 150 MG/1
150 CAPSULE, EXTENDED RELEASE ORAL DAILY
Status: DISCONTINUED | OUTPATIENT
Start: 2023-10-07 | End: 2023-10-10

## 2023-10-06 RX ORDER — CYANOCOBALAMIN 1000 UG/ML
1000 INJECTION, SOLUTION INTRAMUSCULAR; SUBCUTANEOUS
Status: DISCONTINUED | OUTPATIENT
Start: 2023-10-07 | End: 2023-10-12 | Stop reason: HOSPADM

## 2023-10-06 RX ORDER — MELATONIN
1000 DAILY
Status: DISCONTINUED | OUTPATIENT
Start: 2023-11-12 | End: 2023-10-12 | Stop reason: HOSPADM

## 2023-10-06 RX ORDER — GABAPENTIN 100 MG/1
100 CAPSULE ORAL 3 TIMES DAILY
Status: DISCONTINUED | OUTPATIENT
Start: 2023-10-06 | End: 2023-10-09

## 2023-10-06 RX ORDER — ERGOCALCIFEROL 1.25 MG/1
50000 CAPSULE ORAL WEEKLY
Status: DISCONTINUED | OUTPATIENT
Start: 2023-10-07 | End: 2023-10-12 | Stop reason: HOSPADM

## 2023-10-06 RX ADMIN — GABAPENTIN 100 MG: 100 CAPSULE ORAL at 08:41

## 2023-10-06 RX ADMIN — ATORVASTATIN CALCIUM 10 MG: 10 TABLET, FILM COATED ORAL at 17:37

## 2023-10-06 RX ADMIN — EMPAGLIFLOZIN 25 MG: 25 TABLET, FILM COATED ORAL at 08:41

## 2023-10-06 RX ADMIN — GABAPENTIN 100 MG: 100 CAPSULE ORAL at 17:37

## 2023-10-06 RX ADMIN — NYSTATIN: 100000 POWDER TOPICAL at 22:44

## 2023-10-06 RX ADMIN — NICOTINE 1 PATCH: 21 PATCH, EXTENDED RELEASE TRANSDERMAL at 08:43

## 2023-10-06 RX ADMIN — VENLAFAXINE HYDROCHLORIDE 75 MG: 75 CAPSULE, EXTENDED RELEASE ORAL at 08:41

## 2023-10-06 RX ADMIN — FLUTICASONE PROPIONATE 1 SPRAY: 50 SPRAY, METERED NASAL at 08:44

## 2023-10-06 RX ADMIN — DOXEPIN HYDROCHLORIDE 25 MG: 25 CAPSULE ORAL at 22:41

## 2023-10-06 RX ADMIN — INSULIN LISPRO 2 UNITS: 100 INJECTION, SOLUTION INTRAVENOUS; SUBCUTANEOUS at 22:42

## 2023-10-06 RX ADMIN — NYSTATIN: 100000 POWDER TOPICAL at 08:43

## 2023-10-06 RX ADMIN — INSULIN LISPRO 2 UNITS: 100 INJECTION, SOLUTION INTRAVENOUS; SUBCUTANEOUS at 12:00

## 2023-10-06 RX ADMIN — GABAPENTIN 100 MG: 100 CAPSULE ORAL at 22:41

## 2023-10-06 RX ADMIN — B-COMPLEX W/ C & FOLIC ACID TAB 1 TABLET: TAB at 08:41

## 2023-10-06 RX ADMIN — PANTOPRAZOLE SODIUM 40 MG: 40 TABLET, DELAYED RELEASE ORAL at 06:25

## 2023-10-06 RX ADMIN — INSULIN DETEMIR 36 UNITS: 100 INJECTION, SOLUTION SUBCUTANEOUS at 22:43

## 2023-10-06 NOTE — NURSING NOTE
Patient was observed to be visible in the community this evening; spending the majority of the evening in the dining area with peers. Lusi Avery did participate in our night time group. She has been pleasant and cooperative during staff interactions; social with peers. She was medication compliant at  ; held sliding scale insulin coverage for . Patient did receive 36 units of Levemir insulin as per order. Positive for snack and fluids tonight. Continuous safety rounding in progress.
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
## 2023-10-06 NOTE — SOCIAL WORK
LEANNE met with pt in small group room. Obtained BOBY's:    Phyllis Andrade (/contact)  Ph: [phone]    [facility] (pcp)  [address] [facility] [address]  [address] [facility]  Ph: [phone]    [facility] (psych)  [address] [address]  Ph: [phone]    SW placed call to pt's  to discuss pt treatment. LEANNE left  providing SW's, nurses' station and pt phone numbers. Encouraged  to call nurses' station over the weekend for updates and SW will contact  early next week. LEANNE placed call to pt's pcp. Office closed. SW to contact PCP early next week. LEANNE placed call to pt's psych provider. LEANNE left vm with SW's number requesting callback with fax number for d/c summary and scheduled pt appts. SW to call back early next week.

## 2023-10-06 NOTE — NURSING NOTE
Patient visible in milieu, pleasant and cooperative in interaction. Social with staff and peers. Affect flat. Patient endorses moderate anxiety and depression, denies SI/HI, hallucinations. Attended 2 groups thus far. Remains medication compliant and on 7" checks for safety and behaviors.

## 2023-10-06 NOTE — NURSING NOTE
Patient appears to have slept through the overnight hours without issue after receiving PRN Melatonin at 2303. PRN Melatonin seemingly effective for this patient. No further complaints of difficulty sleeping voiced. No acute behaviors observed. Patient remains in bed sleeping at this time. Continuous safety rounding in progress.

## 2023-10-06 NOTE — PROGRESS NOTES
Progress Note - [address] [facility],2Nd Floor 50 y.o. female MRN: [id number]   Unit/Bed#: Manuel Orellana 201-01 Encounter: [id number]    Behavior over the last 24 hours: some improvement. Gilda Rees was seen for continuing care today. She reports slight reduction in her depressed mood, anxiety but remains hopeless with passive wish to die periodically. States she slept better with use of Melatonin last night and Gabapentin helped her with her anxiety. Patient states she has been adjusting well in the unit and has been selectively interacting with peers. She has been compliant with medication and denies any current side effect. Staff report fair interaction in groups. Sleep: slept better  Appetite: normal  Medication side effects: denies  ROS: no complaints, all other systems are negative    Mental Status Evaluation:    Appearance:  age appropriate, overweight   Behavior:  cooperative   Speech:  delayed   Mood:  depressed, dysphoric   Affect:  constricted   Thought Process:  goal directed   Associations: intact associations   Thought Content:  no overt delusions   Perceptual Disturbances: denies auditory hallucinations when asked   Risk Potential: Suicidal ideation - Yes, passive death wish  Homicidal ideation - None   Sensorium:  oriented to person, place and time/date   Memory:  recent and remote memory grossly intact   Consciousness:  alert and awake   Attention: attention span and concentration are age appropriate   Insight:  limited   Judgment: fair   Gait/Station: normal gait/station   Motor Activity: no abnormal movements     Vital signs in last 24 hours:    Temp:  [96.7 °F (35.9 °C)-97.7 °F (36.5 °C)] 96.7 °F (35.9 °C)  HR:  [64-96] 64  Resp:  [18-19] 18  BP: (106-147)/(52-67) 106/52    Laboratory results:   I have personally reviewed all pertinent laboratory/tests results.   Most Recent Labs:   Lab Results   Component Value Date    WBC 6.58 10/02/2023    RBC 5.32 (H) 10/02/2023    HGB 15.2 10/02/2023    HCT 47.3 (H) 10/02/2023     10/02/2023    RDW 13.0 10/02/2023    NEUTROABS 4.35 10/02/2023    SODIUM 139 10/02/2023    K 4.0 10/02/2023     10/02/2023    CO2 23 10/02/2023    BUN 12 10/02/2023    CREATININE 0.95 10/02/2023    GLUC 135 10/02/2023    GLUF 114 (H) 06/20/2023    CALCIUM 9.1 10/02/2023    AST 19 10/02/2023    ALT 16 10/02/2023    ALKPHOS 75 10/02/2023    TP 7.2 10/02/2023    ALB 4.3 10/02/2023    TBILI 0.80 10/02/2023    CHOLESTEROL 155 09/01/2022    HDL 37 (L) 09/01/2022    TRIG 140 09/01/2022    LDLCALC 90 09/01/2022    [address] 09/30/2021    [id number] 0.902 10/02/2023    PREGSERUM Negative 10/02/2023    HGBA1C 6.7 (H) 10/05/2023     10/05/2023       Assessment/Plan   Active Problems:    Type 2 diabetes mellitus with hyperglycemia, with long-term current use of insulin (HCC)    History of DVT (deep vein thrombosis)    History of TIA (transient ischemic attack)    Morbid obesity ([address])    Essential hypertension    AURELIO (obstructive sleep apnea)    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     Current Facility-Administered Medications   Medication Dose Route Frequency Provider Last Rate   • acetaminophen  650 mg Oral Q6H PRN Taco Alanis MD     • albuterol  2 puff Inhalation Q4H PRN Taco Alanis MD     • atorvastatin  10 mg Oral Daily With DMITRY Bro     • benztropine  0.5 mg Oral Q4H PRN Max 6/day Sherian Cornet, CRNP     • doxepin  25 mg Oral HS Ej Martínez MD     • Empagliflozin  25 mg Oral QAM DMITRY Singh     • fluticasone  1 spray Nasal Daily Porfirio Garciales, CRNP     • gabapentin  100 mg Oral TID Sweetie Carter MD     • haloperidol lactate  5 mg Intramuscular Q4H PRN Max 4/day Porfirio Garciales, CRNP     • hydrOXYzine HCL  50 mg Oral Q6H PRN Max 4/day Sweetie Carter MD     • insulin detemir  36 Units Subcutaneous HS Porfirio Garciales, CRNP     • insulin lispro  2-12 Units Subcutaneous HS Dciece Joellen, CRNP     • insulin lispro  2-12 Units Subcutaneous TID AC Heidi Delacruz PA-C     • LORazepam  1 mg Intramuscular Q6H PRN Max 3/day Berniece Dateland, CRNP     • LORazepam  0.5 mg Oral Q6H PRN Max 4/day Dciece Dateland, CRNP     • LORazepam  1 mg Oral Q6H PRN Max 3/day Berniece Dateland, CRNP     • melatonin  3 mg Oral HS PRN Natashace Joellen, CRNP     • multivitamin stress formula  1 tablet Oral Daily Porfirio Garciales, CRNP     • nicotine  1 patch Transdermal Daily Isaak Montes MD     • nicotine polacrilex  4 mg Oral Q2H PRN Isaak Montes MD     • nystatin   Topical BID Heidi Delacruz PA-C     • pantoprazole  40 mg Oral Early Morning Isaak Montes MD     • phenazopyridine  100 mg Oral TID PRN Porfirio Joellen, CRNP     • risperiDONE  0.25 mg Oral Q4H PRN Max 6/day Berniece Dateland, CRNP     • risperiDONE  0.5 mg Oral Q4H PRN Max 3/day Porfirio Dateland, CRNP     • risperiDONE  1 mg Oral Q2H PRN Max 3/day Berniece Joellen, CRNP     • semaglutide (1 mg/dose)  1 mg Subcutaneous Q7 Days Isaak Montes MD     • [START ON 10/7/2023] venlafaxine  150 mg Oral Daily Sweeite Carter MD         Risks / Benefits of Treatment:    Risks, benefits, and possible side effects of medications explained to patient and patient verbalizes understanding and agreement for treatment. Counseling / Coordination of Care:    Patient's progress discussed with staff in treatment team meeting. Medications, treatment progress and treatment plan reviewed with patient. Supportive therapy provided to patient.   Group attendance encouraged.     Mando Delcid MD 10/06/23

## 2023-10-06 NOTE — PROGRESS NOTES
Progress Note - Julio César Toro 50 y.o. female MRN: [id number]    Unit/Bed#: Romel Schumacher 201-01 Encounter: [id number]        Subjective:   Patient seen and examined at bedside after reviewing the chart and discussing the case with the caring staff. Patient examined at bedside. Patient has no acute complaints. Physical Exam   Vitals: Blood pressure 106/52, pulse 64, temperature (!) 96.7 °F (35.9 °C), temperature source Temporal, resp. rate 18, height 5' 6" (1.676 m), weight 134 kg (294 lb 6.4 oz), SpO2 100 %. ,Body mass index is 47.52 kg/m². Constitutional: Patient appears well-developed. HEENT: PERR, EOMI, MMM. Cardiovascular: Normal rate and regular rhythm. Pulmonary/Chest: Effort normal and breath sounds normal.   Abdomen: Soft, + BS, NT. Assessment/Plan:  Julio César Toro is a(n) 50y.o. year old female with MDD.     1.  Cardiac with history of HTN, dyslipidemia. Continue atorvastatin 10 mg daily. Lisinopril 2.5 mg discontinued as patient refusing. 2.  Type 2 diabetes mellitus requiring insulin. Patient using insulin pump at home. On unit will use Levemir 36 units at bedtime. Humalog insulin sliding scale coverage 4 times daily with accu-cheks ac/hs. Continue Jardiance 25 mg daily and Ozempic 1 mg subcutaneously weekly. Carb controlled diet. Hgb A1c 6.7% on 10/5/2023. Will reach out to endocrinology if needed for insulin dosing recommendations. 3.  Allergic rhinitis. Patient is on Flonase nasal spray. 4.  GERD. Patient is on Protonix 40 mg daily. 5.  Tobacco abuse. NRT. 6.  Asthma. Stable. Albuterol inhaler as needed. 7. Intertrigo. Start nystatin powder twice daily. 8.  Vitamin D deficiency. Patient started on vitamin D2 50,000 units weekly for 6 weeks followed by vitamin D3 1000 units daily. 9.  Vitamin B12 deficiency. Patient started on monthly vitamin B12 injections. The patient was discussed with Dr. Adin Marshall and he is in agreement with the above note.

## 2023-10-06 NOTE — NURSING NOTE
Patient presents to the nurses station to inform that she is having difficulty initiating sleep and requests PRN medication. Administered PRN Melatonin as per order. Will monitor for medication effectiveness.

## 2023-10-06 NOTE — PLAN OF CARE
Problem: Ineffective Coping  Goal: Demonstrates healthy coping skills  Outcome: Progressing  Goal: Participates in unit activities  Description: Interventions:  - Provide therapeutic environment   - Provide required programming   - Redirect inappropriate behaviors   Outcome: Progressing  Goal: Patient/Family participate in treatment and DC plans  Description: Interventions:  - Provide therapeutic environment  Outcome: Progressing  Goal: Patient/Family verbalizes awareness of resources  Outcome: Progressing     Problem: Risk for Self Injury/Neglect  Goal: Treatment Goal: Remain safe during length of stay, learn and adopt new coping skills, and be free of self-injurious ideation, impulses and acts at the time of discharge  Outcome: Progressing  Goal: Verbalize thoughts and feelings  Description: Interventions:  - Assess and re-assess patient's lethality and potential for self-injury  - Engage patient in 1:1 interactions, daily, for a minimum of 15 minutes  - Encourage patient to express feelings, fears, frustrations, hopes  - Establish rapport/trust with patient   Outcome: Progressing  Goal: Refrain from harming self  Description: Interventions:  - Monitor patient closely, per order  - Develop a trusting relationship  - Supervise medication ingestion, monitor effects and side effects   Outcome: Progressing  Goal: Complete daily ADLs, including personal hygiene independently, as able  Description: Interventions:  - Observe, teach, and assist patient with ADLS  - Monitor and promote a balance of rest/activity, with adequate nutrition and elimination  Outcome: Progressing     Problem: Depression  Goal: Treatment Goal: Demonstrate behavioral control of depressive symptoms, verbalize feelings of improved mood/affect, and adopt new coping skills prior to discharge  Outcome: Progressing  Goal: Refrain from isolation  Description: Interventions:  - Develop a trusting relationship   - Encourage socialization   Outcome: Progressing  Goal: Refrain from self-neglect  Outcome: Progressing  Goal: Complete daily ADLs, including personal hygiene independently, as able  Description: Interventions:  - Observe, teach, and assist patient with ADLS  -  Monitor and promote a balance of rest/activity, with adequate nutrition and elimination   Outcome: Progressing     Problem: Anxiety  Goal: Anxiety is at manageable level  Description: Interventions:  - Assess and monitor patient's anxiety level. - Monitor for signs and symptoms (heart palpitations, chest pain, shortness of breath, headaches, nausea, feeling jumpy, restlessness, irritable, apprehensive). - Collaborate with interdisciplinary team and initiate plan and interventions as ordered. - [place] patient to unit/surroundings  - Explain treatment plan  - Encourage participation in care  - Encourage verbalization of concerns/fears  - Identify coping mechanisms  - Assist in developing anxiety-reducing skills  - Administer/offer alternative therapies  - Limit or eliminate stimulants  Outcome: Progressing     Problem: Nutrition/Hydration-ADULT  Goal: Nutrient/Hydration intake appropriate for improving, restoring or maintaining nutritional needs  Description: Monitor and assess patient's nutrition/hydration status for malnutrition. Collaborate with interdisciplinary team and initiate plan and interventions as ordered. Monitor patient's weight and dietary intake as ordered or per policy. Utilize nutrition screening tool and intervene as necessary. Determine patient's food preferences and provide high-protein, high-caloric foods as appropriate.      INTERVENTIONS:  - Monitor oral intake, urinary output, labs, and treatment plans  - Assess nutrition and hydration status and recommend course of action  - Evaluate amount of meals eaten  - Assist patient with eating if necessary   - Allow adequate time for meals  - Recommend/ encourage appropriate diets, oral nutritional supplements, and vitamin/mineral supplements  - Order, calculate, and assess calorie counts as needed  - Recommend, monitor, and adjust tube feedings and TPN/PPN based on assessed needs  - Assess need for intravenous fluids  - Provide specific nutrition/hydration education as appropriate  - Include patient/family/caregiver in decisions related to nutrition  Outcome: Progressing

## 2023-10-07 LAB
GLUCOSE SERPL-MCNC: 110 MG/DL (ref 65–140)
GLUCOSE SERPL-MCNC: 135 MG/DL (ref 65–140)
GLUCOSE SERPL-MCNC: 149 MG/DL (ref 65–140)
GLUCOSE SERPL-MCNC: 165 MG/DL (ref 65–140)

## 2023-10-07 PROCEDURE — 82948 REAGENT STRIP/BLOOD GLUCOSE: CPT

## 2023-10-07 PROCEDURE — 99233 SBSQ HOSP IP/OBS HIGH 50: CPT | Performed by: NURSE PRACTITIONER

## 2023-10-07 RX ADMIN — PANTOPRAZOLE SODIUM 40 MG: 40 TABLET, DELAYED RELEASE ORAL at 06:29

## 2023-10-07 RX ADMIN — GABAPENTIN 100 MG: 100 CAPSULE ORAL at 09:02

## 2023-10-07 RX ADMIN — HYDROXYZINE HYDROCHLORIDE 50 MG: 50 TABLET, FILM COATED ORAL at 21:25

## 2023-10-07 RX ADMIN — FLUTICASONE PROPIONATE 1 SPRAY: 50 SPRAY, METERED NASAL at 09:00

## 2023-10-07 RX ADMIN — Medication 3 MG: at 00:46

## 2023-10-07 RX ADMIN — INSULIN DETEMIR 36 UNITS: 100 INJECTION, SOLUTION SUBCUTANEOUS at 21:09

## 2023-10-07 RX ADMIN — NYSTATIN: 100000 POWDER TOPICAL at 21:15

## 2023-10-07 RX ADMIN — B-COMPLEX W/ C & FOLIC ACID TAB 1 TABLET: TAB at 09:01

## 2023-10-07 RX ADMIN — ATORVASTATIN CALCIUM 10 MG: 10 TABLET, FILM COATED ORAL at 16:42

## 2023-10-07 RX ADMIN — NICOTINE 1 PATCH: 21 PATCH, EXTENDED RELEASE TRANSDERMAL at 09:04

## 2023-10-07 RX ADMIN — EMPAGLIFLOZIN 25 MG: 25 TABLET, FILM COATED ORAL at 09:00

## 2023-10-07 RX ADMIN — DOXEPIN HYDROCHLORIDE 25 MG: 25 CAPSULE ORAL at 21:09

## 2023-10-07 RX ADMIN — NYSTATIN: 100000 POWDER TOPICAL at 09:05

## 2023-10-07 RX ADMIN — VENLAFAXINE HYDROCHLORIDE 150 MG: 150 CAPSULE, EXTENDED RELEASE ORAL at 09:02

## 2023-10-07 RX ADMIN — CYANOCOBALAMIN 1000 MCG: 1000 INJECTION, SOLUTION INTRAMUSCULAR at 09:05

## 2023-10-07 RX ADMIN — GABAPENTIN 100 MG: 100 CAPSULE ORAL at 21:09

## 2023-10-07 RX ADMIN — INSULIN LISPRO 2 UNITS: 100 INJECTION, SOLUTION INTRAVENOUS; SUBCUTANEOUS at 21:13

## 2023-10-07 RX ADMIN — ERGOCALCIFEROL 50000 UNITS: 1.25 CAPSULE, LIQUID FILLED ORAL at 09:01

## 2023-10-07 RX ADMIN — GABAPENTIN 100 MG: 100 CAPSULE ORAL at 16:42

## 2023-10-07 NOTE — PROGRESS NOTES
Progress Note - Gladis Pabon 50 y.o. female MRN: [id number]    Unit/Bed#: Mayo [facility] 201-01 Encounter: [id number]        Subjective:   Patient seen and examined at bedside after reviewing the chart and discussing the case with the caring staff. Patient examined at bedside. Patient has no acute complaints. On review of patient's labs it was found that patient's vitamin D level was low 21.4 and B12 level is also low at 381. Physical Exam   Vitals: Blood pressure 115/54, pulse 82, temperature 98.5 °F (36.9 °C), temperature source Temporal, resp. rate 18, height 5' 6" (1.676 m), weight 135 kg (297 lb 9.6 oz), SpO2 100 %. ,Body mass index is 48.03 kg/m². Constitutional: Patient appears well-developed. HEENT: PERR, EOMI, MMM. Cardiovascular: Normal rate and regular rhythm. Pulmonary/Chest: Effort normal and breath sounds normal.   Abdomen: Soft, + BS, NT. Assessment/Plan:  Gladis Pabon is a(n) 50y.o. year old female with MDD.     1.  Cardiac with history of HTN, dyslipidemia. Continue atorvastatin 10 mg daily. Lisinopril 2.5 mg discontinued as patient refusing. 2.  Type 2 diabetes mellitus requiring insulin. Patient using insulin pump at home. On unit will use Levemir 36 units at bedtime. Humalog insulin sliding scale coverage 4 times daily with accu-cheks ac/hs. Continue Jardiance 25 mg daily and Ozempic 1 mg subcutaneously weekly. Carb controlled diet. Hgb A1c 6.7% on 10/5/2023. Will reach out to endocrinology if needed for insulin dosing recommendations. 3.  Allergic rhinitis. Patient is on Flonase nasal spray. 4.  GERD. Patient is on Protonix 40 mg daily. 5.  Tobacco abuse. NRT. 6.  Asthma. Stable. Albuterol inhaler as needed. 7. Intertrigo. Start nystatin powder twice daily. 8.  Vitamin D deficiency. Patient started on vitamin D2 50,000 units weekly for 6 weeks followed by vitamin D3 1000 units daily. 9.  Vitamin B12 deficiency.   Patient started on monthly vitamin B12 injections.

## 2023-10-07 NOTE — PROGRESS NOTES
Progress Note - [address] [facility],[address] Floor 50 y.o. female MRN: [id number]   Unit/Bed#: Mariluz Avilez Encounter: [id number]      Documentation, nursing notes, medication reconciliation, labs, and vitals reviewed. Patient was seen for continuing care and reviewed with treatment team. No acute events over the past 24 hours. Per nursing report, adjusting well to the unit and selectively interacting with peers, continues flat and depressed. Medication changes over the past 24 hours: She is started on gabapentin 100 mg 3 times a day for anxiety and Effexor was increased. Continues to tolerate current medications with no adverse effects. On evaluation today, is seen in the community dining room. Social with peers and participating in therapeutic milieu. Continues to report depression and occasional passive death wish. Does acknowledge slight reduction in her depressed mood and anxiety with recent medicine changes. Sleep is improved. .  No suicidal ideation, plan, or intent. Denies perceptual disturbances and does not exhibit any symptoms of hayley on evaluation.     Sleep: slept off and on  Appetite: fair  Medication side effects: No   ROS: all other systems are negative    Mental Status Evaluation:    Appearance:  marginal hygiene   Behavior:  pleasant, cooperative   Speech:  soft   Mood:  depressed   Affect:  flat   Thought Process:  goal directed   Associations: intact associations   Thought Content:  no overt delusions   Perceptual Disturbances: none   Risk Potential: Suicidal ideation - Yes, passive death wish, No active suicidal ideation  Homicidal ideation - None  Potential for aggression - No   Sensorium:  oriented to person, place and time/date   Memory:  recent and remote memory grossly intact   Consciousness:  alert and awake   Attention: decreased concentration and decreased attention span   Insight:  fair   Judgment: fair   Gait/Station: in wheelchair   Motor Activity: no abnormal movements Vital signs in last 24 hours:    Temp:  [98.1 °F (36.7 °C)-98.5 °F (36.9 °C)] 98.5 °F (36.9 °C)  HR:  [72-82] 82  Resp:  [18] 18  BP: (115-152)/(54-72) 115/54    Laboratory results: I have personally reviewed all pertinent laboratory/tests results. 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     Continue current medications:  Current Facility-Administered Medications   Medication Dose Route Frequency Provider Last Rate   • acetaminophen  650 mg Oral Q6H PRN Rosa Maria Quiroz MD     • albuterol  2 puff Inhalation Q4H PRN Rosa Maria Quiroz MD     • atorvastatin  10 mg Oral Daily With DMITRY Bro     • benztropine  0.5 mg Oral Q4H PRN Max 6/day DMITRY Boo     • [START ON 11/12/2023] cholecalciferol  1,000 Units Oral Daily Heidi Delacruz PA-C     • cyanocobalamin  1,000 mcg Intramuscular Q30 Days Heidi Delacruz PA-C     • doxepin  25 mg Oral HS Lore Iglesias MD     • Empagliflozin  25 mg Oral QAM DMITRY Boo     • ergocalciferol  50,000 Units Oral Weekly Heidi Delacruz PA-C     • fluticasone  1 spray Nasal Daily Richrd Block DMITRY Mares     • gabapentin  100 mg Oral TID Geovany Diaz MD     • haloperidol lactate  5 mg Intramuscular Q4H PRN Max 4/day DMITRY Zhang     • hydrOXYzine HCL  50 mg Oral Q6H PRN Max 4/day Geovany Diaz MD     • insulin detemir  36 Units Subcutaneous HS JACOB ZhangNP     • insulin lispro  2-12 Units Subcutaneous HS DMITRY Zhang     • insulin lispro  2-12 Units Subcutaneous TID AC Heidi Delacruz PA-C     • LORazepam  1 mg Intramuscular Q6H PRN Max 3/day DMITRY Zhang     • LORazepam  0.5 mg Oral Q6H PRN Max 4/day DMITRY Zhang     • LORazepam  1 mg Oral Q6H PRN Max 3/day JACOB ZhangNP     • melatonin  3 mg Oral HS PRN DMITRY Zhang     • multivitamin stress formula  1 tablet Oral Daily DMITRY Zhang     • nicotine  1 patch Transdermal Daily Param Delgado MD     • nicotine polacrilex  4 mg Oral Q2H PRN Param Delgado MD     • nystatin   Topical BID Heidi Delacruz PA-C     • pantoprazole  40 mg Oral Early Morning Param Delgado MD     • phenazopyridine  100 mg Oral TID PRN DMITRY Zhang     • risperiDONE  0.25 mg Oral Q4H PRN Max 6/day DMITRY Zhang     • risperiDONE  0.5 mg Oral Q4H PRN Max 3/day DMITRY Zhang     • risperiDONE  1 mg Oral Q2H PRN Max 3/day DMITRY Zhang     • semaglutide (1 mg/dose)  1 mg Subcutaneous Q7 Days Param Delgado MD     • venlafaxine  150 mg Oral Daily Geovany Diaz MD         Risks / Benefits of Treatment:    Risks, benefits, and possible side effects of medications explained to patient and patient verbalizes understanding and agreement for treatment. Counseling / Coordination of Care:    Patient's progress discussed with staff in treatment team meeting. Medications, treatment progress and treatment plan reviewed with patient.     DMITRY Lujan 10/07/23

## 2023-10-07 NOTE — NURSING NOTE
Patient visible on the unit. Pleasant and cooperative. Social with peers. Denies SI, HI, hallucinations. Endorses moderate anxiety and depression. Compliant with  medications. PRN Melatonin given for sleep. Q 7 minute checks maintained. Will continue to monitor and access.

## 2023-10-07 NOTE — NURSING NOTE
Patient is pleasant and appropriate in conversation. She attends groups and is social with peers. She reports "a little anxiety" and states she gets overwhelmed when around people for too long. She was encouraged to take breaks in her room and she agreed. She denies suicidal thoughts. No complaints of pain. Takes all medications without difficulty.

## 2023-10-07 NOTE — PLAN OF CARE
Problem: Ineffective Coping  Goal: Demonstrates healthy coping skills  Outcome: Progressing     Problem: Risk for Self Injury/Neglect  Goal: Treatment Goal: Remain safe during length of stay, learn and adopt new coping skills, and be free of self-injurious ideation, impulses and acts at the time of discharge  Outcome: Progressing  Goal: Verbalize thoughts and feelings  Description: Interventions:  - Assess and re-assess patient's lethality and potential for self-injury  - Engage patient in 1:1 interactions, daily, for a minimum of 15 minutes  - Encourage patient to express feelings, fears, frustrations, hopes  - Establish rapport/trust with patient   Outcome: Progressing  Goal: Refrain from harming self  Description: Interventions:  - Monitor patient closely, per order  - Develop a trusting relationship  - Supervise medication ingestion, monitor effects and side effects   Outcome: Progressing  Goal: Complete daily ADLs, including personal hygiene independently, as able  Description: Interventions:  - Observe, teach, and assist patient with ADLS  - Monitor and promote a balance of rest/activity, with adequate nutrition and elimination  Outcome: Progressing     Problem: Depression  Goal: Treatment Goal: Demonstrate behavioral control of depressive symptoms, verbalize feelings of improved mood/affect, and adopt new coping skills prior to discharge  Outcome: Progressing  Goal: Refrain from isolation  Description: Interventions:  - Develop a trusting relationship   - Encourage socialization   Outcome: Progressing  Goal: Refrain from self-neglect  Outcome: Progressing     Problem: Anxiety  Goal: Anxiety is at manageable level  Description: Interventions:  - Assess and monitor patient's anxiety level. - Monitor for signs and symptoms (heart palpitations, chest pain, shortness of breath, headaches, nausea, feeling jumpy, restlessness, irritable, apprehensive).    - Collaborate with interdisciplinary team and initiate plan and interventions as ordered. - [place] patient to unit/surroundings  - Explain treatment plan  - Encourage participation in care  - Encourage verbalization of concerns/fears  - Identify coping mechanisms  - Assist in developing anxiety-reducing skills  - Administer/offer alternative therapies  - Limit or eliminate stimulants  Outcome: Progressing     Problem: Nutrition/Hydration-ADULT  Goal: Nutrient/Hydration intake appropriate for improving, restoring or maintaining nutritional needs  Description: Monitor and assess patient's nutrition/hydration status for malnutrition. Collaborate with interdisciplinary team and initiate plan and interventions as ordered. Monitor patient's weight and dietary intake as ordered or per policy. Utilize nutrition screening tool and intervene as necessary. Determine patient's food preferences and provide high-protein, high-caloric foods as appropriate.      INTERVENTIONS:  - Monitor oral intake, urinary output, labs, and treatment plans  - Assess nutrition and hydration status and recommend course of action  - Evaluate amount of meals eaten  - Assist patient with eating if necessary   - Allow adequate time for meals  - Recommend/ encourage appropriate diets, oral nutritional supplements, and vitamin/mineral supplements  - Order, calculate, and assess calorie counts as needed  - Recommend, monitor, and adjust tube feedings and TPN/PPN based on assessed needs  - Assess need for intravenous fluids  - Provide specific nutrition/hydration education as appropriate  - Include patient/family/caregiver in decisions related to nutrition  Outcome: Progressing

## 2023-10-08 PROBLEM — F43.10 PTSD (POST-TRAUMATIC STRESS DISORDER): Status: ACTIVE | Noted: 2023-10-08

## 2023-10-08 PROBLEM — F41.1 GENERALIZED ANXIETY DISORDER: Status: ACTIVE | Noted: 2023-10-08

## 2023-10-08 PROBLEM — F33.2 SEVERE EPISODE OF RECURRENT MAJOR DEPRESSIVE DISORDER, WITHOUT PSYCHOTIC FEATURES (HCC): Status: ACTIVE | Noted: 2023-10-08

## 2023-10-08 LAB
GLUCOSE SERPL-MCNC: 109 MG/DL (ref 65–140)
GLUCOSE SERPL-MCNC: 133 MG/DL (ref 65–140)
GLUCOSE SERPL-MCNC: 168 MG/DL (ref 65–140)
GLUCOSE SERPL-MCNC: 176 MG/DL (ref 65–140)

## 2023-10-08 PROCEDURE — 82948 REAGENT STRIP/BLOOD GLUCOSE: CPT

## 2023-10-08 PROCEDURE — 99232 SBSQ HOSP IP/OBS MODERATE 35: CPT | Performed by: NURSE PRACTITIONER

## 2023-10-08 RX ORDER — HYDROXYZINE 50 MG/1
100 TABLET, FILM COATED ORAL
Status: DISCONTINUED | OUTPATIENT
Start: 2023-10-08 | End: 2023-10-11

## 2023-10-08 RX ADMIN — FLUTICASONE PROPIONATE 1 SPRAY: 50 SPRAY, METERED NASAL at 09:18

## 2023-10-08 RX ADMIN — DOXEPIN HYDROCHLORIDE 25 MG: 25 CAPSULE ORAL at 21:05

## 2023-10-08 RX ADMIN — INSULIN LISPRO 2 UNITS: 100 INJECTION, SOLUTION INTRAVENOUS; SUBCUTANEOUS at 16:24

## 2023-10-08 RX ADMIN — NICOTINE 1 PATCH: 21 PATCH, EXTENDED RELEASE TRANSDERMAL at 09:20

## 2023-10-08 RX ADMIN — ACETAMINOPHEN 650 MG: 325 TABLET ORAL at 02:27

## 2023-10-08 RX ADMIN — GABAPENTIN 100 MG: 100 CAPSULE ORAL at 21:05

## 2023-10-08 RX ADMIN — B-COMPLEX W/ C & FOLIC ACID TAB 1 TABLET: TAB at 09:18

## 2023-10-08 RX ADMIN — GABAPENTIN 100 MG: 100 CAPSULE ORAL at 09:18

## 2023-10-08 RX ADMIN — ATORVASTATIN CALCIUM 10 MG: 10 TABLET, FILM COATED ORAL at 17:36

## 2023-10-08 RX ADMIN — INSULIN DETEMIR 36 UNITS: 100 INJECTION, SOLUTION SUBCUTANEOUS at 21:06

## 2023-10-08 RX ADMIN — NYSTATIN 1 APPLICATION: 100000 POWDER TOPICAL at 09:19

## 2023-10-08 RX ADMIN — EMPAGLIFLOZIN 25 MG: 25 TABLET, FILM COATED ORAL at 09:18

## 2023-10-08 RX ADMIN — GABAPENTIN 100 MG: 100 CAPSULE ORAL at 17:49

## 2023-10-08 RX ADMIN — INSULIN LISPRO 2 UNITS: 100 INJECTION, SOLUTION INTRAVENOUS; SUBCUTANEOUS at 21:08

## 2023-10-08 RX ADMIN — PANTOPRAZOLE SODIUM 40 MG: 40 TABLET, DELAYED RELEASE ORAL at 06:03

## 2023-10-08 RX ADMIN — NYSTATIN: 100000 POWDER TOPICAL at 21:10

## 2023-10-08 RX ADMIN — VENLAFAXINE HYDROCHLORIDE 150 MG: 150 CAPSULE, EXTENDED RELEASE ORAL at 09:18

## 2023-10-08 NOTE — PROGRESS NOTES
Progress Note - Marco Wyatt 50 y.o. female MRN: [id number]    Unit/Bed#: [facility] 201-01 Encounter: [id number]        Subjective:   Patient seen and examined at bedside after reviewing the chart and discussing the case with the caring staff. Patient examined at bedside. Patient has no acute complaints. Physical Exam   Vitals: Blood pressure 142/66, pulse 78, temperature 98.6 °F (37 °C), temperature source Temporal, resp. rate 18, height 5' 6" (1.676 m), weight 135 kg (297 lb 9.6 oz), SpO2 99 %. ,Body mass index is 48.03 kg/m². Constitutional: Patient appears well-developed. HEENT: PERR, EOMI, MMM. Cardiovascular: Normal rate and regular rhythm. Pulmonary/Chest: Effort normal and breath sounds normal.   Abdomen: Soft, + BS, NT. Assessment/Plan:  Marco Wyatt is a(n) 50y.o. year old female with MDD.     1.  Cardiac with history of HTN, dyslipidemia. Continue atorvastatin 10 mg daily. Lisinopril 2.5 mg discontinued as patient refusing. 2.  Type 2 diabetes mellitus requiring insulin. Patient using insulin pump at home. On unit will use Levemir 36 units at bedtime. Humalog insulin sliding scale coverage 4 times daily with accu-cheks ac/hs. Continue Jardiance 25 mg daily and Ozempic 1 mg subcutaneously weekly. Carb controlled diet. Hgb A1c 6.7% on 10/5/2023. Will reach out to endocrinology if needed for insulin dosing recommendations. 3.  Allergic rhinitis. Patient is on Flonase nasal spray. 4.  GERD. Patient is on Protonix 40 mg daily. 5.  Tobacco abuse. NRT. 6.  Asthma. Stable. Albuterol inhaler as needed. 7. Intertrigo. Start nystatin powder twice daily. 8.  Vitamin D deficiency. Patient started on vitamin D2 50,000 units weekly for 6 weeks followed by vitamin D3 1000 units daily. 9.  Vitamin B12 deficiency. Patient started on monthly vitamin B12 injections.

## 2023-10-08 NOTE — NURSING NOTE
She controlled and visible in the community. She claimed feeling much better after taking the atarax. Positive for medications and snacks. Denies any suicidal or homicidal ideations. No behavioral issues. No change in medical condition or complaints voiced. Maintained on q 7 minute checks. No aspiration risks noted. Medication education given. Care Plan reviewed and amended. Will continue to monitor.

## 2023-10-08 NOTE — NURSING NOTE
Pt complain she has difficulty falling asleep and wants medication. Claimed feeling anxious. Offered Atarax 50 mg po prn and given. To continue Q7 min safety checks and monitor.

## 2023-10-08 NOTE — NURSING NOTE
Patient was visible in community throughout AM. Notes somewhat improved mood and denies additional adverse psychiatric sx. Pleasant and cooperative on approach. Cooperative with medications administration. Attends and participates in unit activities. Involved in personal OT projects outside of srurctured group times. Physical assessment completed: Lungs clear ; no cough, dyspnea or SOB. Abdomen soft, non-tender without pain or guarding on palpation. Last BM 10/5; patient reported as 'normal pattern.' without feeling of constipation. Light reddened rash under bilateral breasts; improving with scheduled use of Nystatin powder. Plus 1 bilateral, non-pitting foot/ankle edema; bilateral  pedal pulses easily palpated. No wounds or abnormalities of feet/toes. Skin intact throughout. Patient did report light naval 'drainage' ; not observed on eval. Stated she reported to medical MD previously.

## 2023-10-08 NOTE — PROGRESS NOTES
Progress Note - [address] [facility] [place],[address] 50 y.o. female MRN: [id number]   Unit/Bed#: [facility] Encounter: [id number]      Documentation, nursing notes, medication reconciliation, labs, and vitals reviewed. Patient was seen for continuing care and reviewed with treatment team. No acute events over the past 24 hours. Per nursing report, she has been active in the milieu, brighter affect, pleasant and cooperative. No medication changes over the past 24 hours. Continues to tolerate current medications with no adverse effects. On evaluation today, she is fixated on her sleep. States doxepin has not been helpful for her sleep. States she took as needed hydroxyzine 50 mg last night. States that was not fully effective. States at home she was taking 75 mg, and still not quite effective. She would like to try hydroxyzine 100 mg for sleep. We will add as a as needed for sleep tonight. Reports the addition of gabapentin was helpful for her anxiety and feels some improvement in her depression with the Effexor. States her biggest stressor right now is issues with her Social Security disability. Also describes "PTSD "symptoms due to constant worry of metastasis of cancer-as her doctor told her she was at high risk    No suicidal ideation, plan, or intent. Denies perceptual disturbances and does not exhibit any symptoms of hayley on evaluation.     Sleep: difficulty falling asleep, frequent awakenings  Appetite: fair  Medication side effects: No   ROS: no complaints, all other systems are negative    Mental Status Evaluation:    Appearance:  age appropriate   Behavior:  cooperative   Speech:  normal rate, normal volume   Mood:  depressed   Affect:  flat   Thought Process:  coherent, goal directed   Associations: intact associations   Thought Content:  no overt delusions   Perceptual Disturbances: none   Risk Potential: Suicidal ideation - None  Homicidal ideation - None  Potential for aggression - No Sensorium:  oriented to person, place and time/date   Memory:  recent and remote memory grossly intact   Consciousness:  alert and awake   Attention: attention span and concentration appear shorter than expected for age   Insight:  limited   Judgment: limited   Gait/Station: normal gait/station, normal balance   Motor Activity: no abnormal movements     Vital signs in last 24 hours:    Temp:  [97.5 °F (36.4 °C)-97.6 °F (36.4 °C)] 97.5 °F (36.4 °C)  HR:  [65-70] 65  Resp:  [18] 18  BP: (125-143)/(65-78) 143/69    Laboratory results: I have personally reviewed all pertinent laboratory/tests results. 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     Continue current medications:  Current Facility-Administered Medications   Medication Dose Route Frequency Provider Last Rate   • acetaminophen  650 mg Oral Q6H PRN Param Delgado MD     • albuterol  2 puff Inhalation Q4H PRN Param Delgado MD     • atorvastatin  10 mg Oral Daily With DMITRY Bro     • benztropine  0.5 mg Oral Q4H PRN Max 6/day Con DMITRY Galindo     • [START ON 11/12/2023] cholecalciferol  1,000 Units Oral Daily Heidi Delacruz PA-C     • cyanocobalamin  1,000 mcg Intramuscular Q30 Days Heidi Delacruz PA-C     • doxepin  25 mg Oral HS Zion Melendez MD     • Empagliflozin  25 mg Oral QAM Con Josie CRNP     • ergocalciferol  50,000 Units Oral Weekly Heidi Delacruz PA-C     • fluticasone  1 spray Nasal Daily Con Josie CRNP     • gabapentin  100 mg Oral TID Zion Melendez MD     • haloperidol lactate  5 mg Intramuscular Q4H PRN Max 4/day Con Josie CRNP     • hydrOXYzine HCL  50 mg Oral Q6H PRN Max 4/day Zion Melendez MD     • insulin detemir  36 Units Subcutaneous HS Con Josie CRNP     • insulin lispro  2-12 Units Subcutaneous HS Con Josie CRNP     • insulin lispro  2-12 Units Subcutaneous TID AC Heidi Delacruz PA-C     • LORazepam  1 mg Intramuscular Q6H PRN Max 3/day Con Josie CRNP     • LORazepam  0.5 mg Oral Q6H PRN Max 4/day Con Josie CRNP     • LORazepam  1 mg Oral Q6H PRN Max 3/day Con Josie CRNP     • melatonin  3 mg Oral HS PRN Con Josie, CRNP     • multivitamin stress formula  1 tablet Oral Daily Con Josie CRNP     • nicotine  1 patch Transdermal Daily Arman Cushing, MD     • nicotine polacrilex  4 mg Oral Q2H PRN Arman Cushing, MD     • nystatin   Topical BID Heidi Delacruz PA-C     • pantoprazole  40 mg Oral Early Morning Arman Cushing, MD     • phenazopyridine  100 mg Oral TID PRN Con Josie, CRNP     • risperiDONE  0.25 mg Oral Q4H PRN Max 6/day Con Josie, CRNP     • risperiDONE  0.5 mg Oral Q4H PRN Max 3/day Con Josie, CRNP     • risperiDONE  1 mg Oral Q2H PRN Max 3/day Con Josie, CRNP     • semaglutide (1 mg/dose)  1 mg Subcutaneous Q7 Days Arman Cushing, MD     • venlafaxine  150 mg Oral Daily Ange Garcia MD         Risks / Benefits of Treatment:    Risks, benefits, and possible side effects of medications explained to patient and patient verbalizes understanding and agreement for treatment. Counseling / Coordination of Care:    Patient's progress discussed with staff in treatment team meeting. Medications, treatment progress and treatment plan reviewed with patient.     DMITRY Morris 10/08/23

## 2023-10-09 LAB
GLUCOSE SERPL-MCNC: 161 MG/DL (ref 65–140)
GLUCOSE SERPL-MCNC: 180 MG/DL (ref 65–140)
GLUCOSE SERPL-MCNC: 186 MG/DL (ref 65–140)
GLUCOSE SERPL-MCNC: 94 MG/DL (ref 65–140)

## 2023-10-09 PROCEDURE — 82948 REAGENT STRIP/BLOOD GLUCOSE: CPT

## 2023-10-09 PROCEDURE — 99232 SBSQ HOSP IP/OBS MODERATE 35: CPT

## 2023-10-09 RX ORDER — GABAPENTIN 100 MG/1
200 CAPSULE ORAL 3 TIMES DAILY
Status: DISCONTINUED | OUTPATIENT
Start: 2023-10-09 | End: 2023-10-12 | Stop reason: HOSPADM

## 2023-10-09 RX ORDER — GABAPENTIN 100 MG/1
200 CAPSULE ORAL 3 TIMES DAILY
Status: DISCONTINUED | OUTPATIENT
Start: 2023-10-09 | End: 2023-10-09

## 2023-10-09 RX ADMIN — PANTOPRAZOLE SODIUM 40 MG: 40 TABLET, DELAYED RELEASE ORAL at 06:03

## 2023-10-09 RX ADMIN — NICOTINE 1 PATCH: 21 PATCH, EXTENDED RELEASE TRANSDERMAL at 12:03

## 2023-10-09 RX ADMIN — GABAPENTIN 200 MG: 100 CAPSULE ORAL at 16:52

## 2023-10-09 RX ADMIN — FLUTICASONE PROPIONATE 1 SPRAY: 50 SPRAY, METERED NASAL at 08:26

## 2023-10-09 RX ADMIN — INSULIN LISPRO 2 UNITS: 100 INJECTION, SOLUTION INTRAVENOUS; SUBCUTANEOUS at 12:42

## 2023-10-09 RX ADMIN — B-COMPLEX W/ C & FOLIC ACID TAB 1 TABLET: TAB at 08:24

## 2023-10-09 RX ADMIN — DOXEPIN HYDROCHLORIDE 25 MG: 25 CAPSULE ORAL at 21:35

## 2023-10-09 RX ADMIN — EMPAGLIFLOZIN 25 MG: 25 TABLET, FILM COATED ORAL at 08:26

## 2023-10-09 RX ADMIN — GABAPENTIN 200 MG: 100 CAPSULE ORAL at 21:35

## 2023-10-09 RX ADMIN — GABAPENTIN 100 MG: 100 CAPSULE ORAL at 08:24

## 2023-10-09 RX ADMIN — INSULIN LISPRO 2 UNITS: 100 INJECTION, SOLUTION INTRAVENOUS; SUBCUTANEOUS at 21:36

## 2023-10-09 RX ADMIN — INSULIN LISPRO 2 UNITS: 100 INJECTION, SOLUTION INTRAVENOUS; SUBCUTANEOUS at 16:52

## 2023-10-09 RX ADMIN — VENLAFAXINE HYDROCHLORIDE 150 MG: 150 CAPSULE, EXTENDED RELEASE ORAL at 08:24

## 2023-10-09 RX ADMIN — INSULIN DETEMIR 36 UNITS: 100 INJECTION, SOLUTION SUBCUTANEOUS at 21:37

## 2023-10-09 RX ADMIN — HYDROXYZINE HYDROCHLORIDE 100 MG: 50 TABLET, FILM COATED ORAL at 21:54

## 2023-10-09 RX ADMIN — ATORVASTATIN CALCIUM 10 MG: 10 TABLET, FILM COATED ORAL at 16:52

## 2023-10-09 NOTE — PROGRESS NOTES
Progress Note - Behavioral Health   Tosin Orozco 50 y.o. 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 Patient calm, cooperative. She is visible on the unit social with select peers. Patient remains depressed and ruminative in thought. She endorses anxiety related to her current issues with social security. Patient compliant with her current medication regimen. She denied any SI/HI/AVH. She did not appear to responding to internal stimuli.     Mental Status Evaluation:  Appearance:  age appropriate and casually dressed   Behavior:  cooperative   Speech:  normal pitch and normal volume   Mood:  anxious and depressed   Affect:  blunted   Thought Process:  goal directed   Associations: intact associations   Thought Content:  no overt delusions   Perceptual Disturbances: denied AVH, did not appear internally preoccupied   Risk Potential: Suicidal Ideations none at present  Homicidal Ideations none at present  Potential for Aggression No   Sensorium:  person, place and time/date   Memory:  recent and remote memory grossly intact   Consciousness:  alert and awake    Attention: attention span appeared shorter than expected for age   Insight:  limited   Judgment: limited   Gait/Station: normal gait/station   Motor Activity: no abnormal movements     Progress Toward Goals: Unchanged. Patient is anxious and ruminative in thought. She is compliant with the current psychotropic medication regimen. She denied side effects from current psychotropic medication regimen. Will continue current psychotropic medication regimen. No discharge date at this time. Neurontin increased to 200 mg p.o. 3 times daily for anxiety. Recommended Treatment: Continue with group therapy, milieu therapy and occupational therapy. Risks, benefits and possible side effects of Medications:   Risks, benefits, and possible side effects of medications explained to patient and patient verbalizes understanding.       Medications:   all current active meds have been reviewed and current meds:   Current Facility-Administered Medications   Medication Dose Route Frequency   • acetaminophen (TYLENOL) tablet 650 mg  650 mg Oral Q6H PRN   • albuterol (PROVENTIL HFA,VENTOLIN HFA) inhaler 2 puff  2 puff Inhalation Q4H PRN   • atorvastatin (LIPITOR) tablet 10 mg  10 mg Oral Daily With Dinner   • benztropine (COGENTIN) tablet 0.5 mg  0.5 mg Oral Q4H PRN Max 6/day   • [START ON 11/12/2023] cholecalciferol (VITAMIN D3) tablet 1,000 Units  1,000 Units Oral Daily   • cyanocobalamin injection 1,000 mcg  1,000 mcg Intramuscular Q30 Days   • doxepin (SINEquan) capsule 25 mg  25 mg Oral HS   • Empagliflozin TABS 25 mg  25 mg Oral QAM   • ergocalciferol (VITAMIN D2) capsule 50,000 Units  50,000 Units Oral Weekly   • fluticasone (FLONASE) 50 mcg/act nasal spray 1 spray  1 spray Nasal Daily   • gabapentin (NEURONTIN) capsule 100 mg  100 mg Oral TID   • haloperidol lactate (HALDOL) injection 5 mg  5 mg Intramuscular Q4H PRN Max 4/day   • hydrOXYzine HCL (ATARAX) tablet 100 mg  100 mg Oral HS PRN   • hydrOXYzine HCL (ATARAX) tablet 50 mg  50 mg Oral Q6H PRN Max 4/day   • insulin detemir (LEVEMIR) subcutaneous injection 36 Units  36 Units Subcutaneous HS   • insulin lispro (HumaLOG) 100 units/mL subcutaneous injection 2-12 Units  2-12 Units Subcutaneous HS   • insulin lispro (HumaLOG) 100 units/mL subcutaneous injection 2-12 Units  2-12 Units Subcutaneous TID AC   • LORazepam (ATIVAN) injection 1 mg  1 mg Intramuscular Q6H PRN Max 3/day   • LORazepam (ATIVAN) tablet 0.5 mg  0.5 mg Oral Q6H PRN Max 4/day   • LORazepam (ATIVAN) tablet 1 mg  1 mg Oral Q6H PRN Max 3/day   • melatonin tablet 3 mg  3 mg Oral HS PRN   • multivitamin stress formula tablet 1 tablet  1 tablet Oral Daily   • nicotine (NICODERM CQ) 21 mg/24 hr TD 24 hr patch 1 patch  1 patch Transdermal Daily   • nicotine polacrilex (NICORETTE) gum 4 mg  4 mg Oral Q2H PRN   • nystatin (MYCOSTATIN) powder   Topical BID   • pantoprazole (PROTONIX) EC tablet 40 mg  40 mg Oral Early Morning   • phenazopyridine (PYRIDIUM) tablet 100 mg  100 mg Oral TID PRN   • risperiDONE (RisperDAL) tablet 0.25 mg  0.25 mg Oral Q4H PRN Max 6/day   • risperiDONE (RisperDAL) tablet 0.5 mg  0.5 mg Oral Q4H PRN Max 3/day   • risperiDONE (RisperDAL) tablet 1 mg  1 mg Oral Q2H PRN Max 3/day   • semaglutide (1 mg/dose) (Ozempic) injection pen 1 mg  1 mg Subcutaneous Q7 Days   • venlafaxine (EFFEXOR-XR) 24 hr capsule 150 mg  150 mg Oral Daily   . Labs: I have personally reviewed all pertinent laboratory/tests results.    Most Recent Labs:   Lab Results   Component Value Date    WBC 6.58 10/02/2023    RBC 5.32 (H) 10/02/2023    HGB 15.2 10/02/2023    HCT 47.3 (H) 10/02/2023     10/02/2023    RDW 13.0 10/02/2023    NEUTROABS 4.35 10/02/2023    SODIUM 139 10/02/2023    K 4.0 10/02/2023     10/02/2023    CO2 23 10/02/2023    BUN 12 10/02/2023    CREATININE 0.95 10/02/2023    GLUC 135 10/02/2023    GLUF 114 (H) 06/20/2023    CALCIUM 9.1 10/02/2023    AST 19 10/02/2023    ALT 16 10/02/2023    ALKPHOS 75 10/02/2023    TP 7.2 10/02/2023    ALB 4.3 10/02/2023    TBILI 0.80 10/02/2023    CHOLESTEROL 155 09/01/2022    HDL 37 (L) 09/01/2022    TRIG 140 09/01/2022    LDLCALC 90 09/01/2022    NONHDLC 151 09/30/2021    RAR3YLGPHMGI 0.902 10/02/2023    PREGSERUM Negative 10/02/2023    HGBA1C 6.7 (H) 10/05/2023     10/05/2023       Counseling / Coordination of Care

## 2023-10-09 NOTE — NURSING NOTE
Patient in community and social. Compliant with evening medications. No complaints of S/I,H/I and AH. Denies any pain at present. Positive for evening snacks. Maintain on q7 min safety checks. Will continue to monitor.

## 2023-10-09 NOTE — PLAN OF CARE
Problem: Depression  Goal: Treatment Goal: Demonstrate behavioral control of depressive symptoms, verbalize feelings of improved mood/affect, and adopt new coping skills prior to discharge  Outcome: Progressing  Goal: Refrain from isolation  Description: Interventions:  - Develop a trusting relationship   - Encourage socialization   Outcome: Progressing  Goal: Refrain from self-neglect  Outcome: Progressing     Problem: Anxiety  Goal: Anxiety is at manageable level  Description: Interventions:  - Assess and monitor patient's anxiety level. - Monitor for signs and symptoms (heart palpitations, chest pain, shortness of breath, headaches, nausea, feeling jumpy, restlessness, irritable, apprehensive). - Collaborate with interdisciplinary team and initiate plan and interventions as ordered.   - [place] patient to unit/surroundings  - Explain treatment plan  - Encourage participation in care  - Encourage verbalization of concerns/fears  - Identify coping mechanisms  - Assist in developing anxiety-reducing skills  - Administer/offer alternative therapies  - Limit or eliminate stimulants  Outcome: Progressing

## 2023-10-09 NOTE — NURSING NOTE
Patient has been visible on the unit throughout the day. She is pleasant and cooperative. Medication compliant. Attends groups. She slept well last night. Endorsed a little anxiety and depression. Denied SI,HI, or hallucinations. Q 7 minute safety checks maintained.

## 2023-10-09 NOTE — PLAN OF CARE
Problem: Ineffective Coping  Goal: Participates in unit activities  Description: Interventions:  - Provide therapeutic environment   - Provide required programming   - Redirect inappropriate behaviors   Outcome: Progressing   In community throughout the shift. Selective peer interaction. Attended and participated appropriately in RT groups/activities.

## 2023-10-09 NOTE — PROGRESS NOTES
Progress Note - Parminder Hilario 50 y.o. female MRN: [id number]    Unit/Bed#: Karel Riddle 201-01 Encounter: [id number]        Subjective:   Patient seen and examined at bedside after reviewing the chart and discussing the case with the caring staff. Patient examined at bedside. Patient has no acute complaints. Physical Exam   Vitals: Blood pressure 92/54, pulse 73, temperature 97.5 °F (36.4 °C), temperature source Temporal, resp. rate 18, height 5' 6" (1.676 m), weight 135 kg (297 lb 9.6 oz), SpO2 100 %. ,Body mass index is 48.03 kg/m². Constitutional: Patient appears well-developed. HEENT: PERR, EOMI, MMM. Cardiovascular: Normal rate and regular rhythm. Pulmonary/Chest: Effort normal and breath sounds normal.   Abdomen: Soft, + BS, NT. Assessment/Plan:  Parminder Hilario is a(n) 50y.o. year old female with MDD.     1.  Cardiac with history of HTN, dyslipidemia. Continue atorvastatin 10 mg daily. Lisinopril 2.5 mg discontinued as patient refusing. 2.  Type 2 diabetes mellitus requiring insulin. Patient using insulin pump at home. On unit will use Levemir 36 units at bedtime. Humalog insulin sliding scale coverage 4 times daily with accu-cheks ac/hs. Continue Jardiance 25 mg daily and Ozempic 1 mg subcutaneously weekly. Carb controlled diet. Hgb A1c 6.7% on 10/5/2023. Will reach out to endocrinology if needed for insulin dosing recommendations. 3.  Allergic rhinitis. Patient is on Flonase nasal spray. 4.  GERD. Patient is on Protonix 40 mg daily. 5.  Tobacco abuse. NRT. 6.  Asthma. Stable. Albuterol inhaler as needed. 7. Intertrigo. Start nystatin powder twice daily. 8.  Vitamin D deficiency. Patient started on vitamin D2 50,000 units weekly for 6 weeks followed by vitamin D3 1000 units daily. 9.  Vitamin B12 deficiency. Patient started on monthly vitamin B12 injections.

## 2023-10-10 LAB
GLUCOSE SERPL-MCNC: 112 MG/DL (ref 65–140)
GLUCOSE SERPL-MCNC: 114 MG/DL (ref 65–140)
GLUCOSE SERPL-MCNC: 122 MG/DL (ref 65–140)
GLUCOSE SERPL-MCNC: 147 MG/DL (ref 65–140)

## 2023-10-10 PROCEDURE — 82948 REAGENT STRIP/BLOOD GLUCOSE: CPT

## 2023-10-10 PROCEDURE — 99232 SBSQ HOSP IP/OBS MODERATE 35: CPT

## 2023-10-10 RX ORDER — DOXEPIN HYDROCHLORIDE 25 MG/1
25 CAPSULE ORAL
Status: DISCONTINUED | OUTPATIENT
Start: 2023-10-10 | End: 2023-10-12 | Stop reason: HOSPADM

## 2023-10-10 RX ORDER — VENLAFAXINE HYDROCHLORIDE 150 MG/1
150 CAPSULE, EXTENDED RELEASE ORAL DAILY
Status: DISCONTINUED | OUTPATIENT
Start: 2023-10-11 | End: 2023-10-12 | Stop reason: HOSPADM

## 2023-10-10 RX ADMIN — NICOTINE 1 PATCH: 21 PATCH, EXTENDED RELEASE TRANSDERMAL at 08:34

## 2023-10-10 RX ADMIN — GABAPENTIN 200 MG: 100 CAPSULE ORAL at 21:41

## 2023-10-10 RX ADMIN — B-COMPLEX W/ C & FOLIC ACID TAB 1 TABLET: TAB at 08:31

## 2023-10-10 RX ADMIN — ATORVASTATIN CALCIUM 10 MG: 10 TABLET, FILM COATED ORAL at 16:10

## 2023-10-10 RX ADMIN — DOXEPIN HYDROCHLORIDE 25 MG: 25 CAPSULE ORAL at 21:42

## 2023-10-10 RX ADMIN — HYDROXYZINE HYDROCHLORIDE 100 MG: 50 TABLET, FILM COATED ORAL at 22:07

## 2023-10-10 RX ADMIN — INSULIN DETEMIR 36 UNITS: 100 INJECTION, SOLUTION SUBCUTANEOUS at 21:42

## 2023-10-10 RX ADMIN — PANTOPRAZOLE SODIUM 40 MG: 40 TABLET, DELAYED RELEASE ORAL at 06:30

## 2023-10-10 RX ADMIN — GABAPENTIN 200 MG: 100 CAPSULE ORAL at 08:31

## 2023-10-10 RX ADMIN — FLUTICASONE PROPIONATE 1 SPRAY: 50 SPRAY, METERED NASAL at 08:31

## 2023-10-10 RX ADMIN — EMPAGLIFLOZIN 25 MG: 25 TABLET, FILM COATED ORAL at 08:31

## 2023-10-10 RX ADMIN — GABAPENTIN 200 MG: 100 CAPSULE ORAL at 16:10

## 2023-10-10 RX ADMIN — VENLAFAXINE HYDROCHLORIDE 150 MG: 150 CAPSULE, EXTENDED RELEASE ORAL at 08:31

## 2023-10-10 NOTE — PROGRESS NOTES
10/10/23   Team Meeting   Meeting Type Daily Rounds   Team Members Present   Team  Members Present Physician;Nurse;; Occupational Therapist   Physician Team Member Dr. Danyn Calwdell; Lance ANDRES   Nursing Team Member Bennett Velez RN   Care Management Team Member Boom Baxter MS, [facility], [facility]   Social Work Team Member [address] [place] [address]   OT Team Member    Patient/Family Present   Patient Present No   Patient's Family Present No     Pt slept last pm. Pt visible on unit, social, pleasant, cooperative. Pt doing coloring activities. Pt denied all mental health symptoms in am. Discussed atarax order for insomnia if needed. Pt to d/c Thursday.

## 2023-10-10 NOTE — NURSING NOTE
Patient requested and received PRN Atarax 100mg as per order for c/o insomnia. Administered as per order "for insomnia".

## 2023-10-10 NOTE — PLAN OF CARE
Problem: Ineffective Coping  Goal: Demonstrates healthy coping skills  Outcome: Progressing  Goal: Participates in unit activities  Description: Interventions:  - Provide therapeutic environment   - Provide required programming   - Redirect inappropriate behaviors   Outcome: Progressing  Goal: Patient/Family participate in treatment and DC plans  Description: Interventions:  - Provide therapeutic environment  Outcome: Progressing  Goal: Patient/Family verbalizes awareness of resources  Outcome: Progressing     Problem: Risk for Self Injury/Neglect  Goal: Treatment Goal: Remain safe during length of stay, learn and adopt new coping skills, and be free of self-injurious ideation, impulses and acts at the time of discharge  Outcome: Progressing  Goal: Verbalize thoughts and feelings  Description: Interventions:  - Assess and re-assess patient's lethality and potential for self-injury  - Engage patient in 1:1 interactions, daily, for a minimum of 15 minutes  - Encourage patient to express feelings, fears, frustrations, hopes  - Establish rapport/trust with patient   Outcome: Progressing  Goal: Refrain from harming self  Description: Interventions:  - Monitor patient closely, per order  - Develop a trusting relationship  - Supervise medication ingestion, monitor effects and side effects   Outcome: Progressing  Goal: Complete daily ADLs, including personal hygiene independently, as able  Description: Interventions:  - Observe, teach, and assist patient with ADLS  - Monitor and promote a balance of rest/activity, with adequate nutrition and elimination  Outcome: Progressing     Problem: Depression  Goal: Treatment Goal: Demonstrate behavioral control of depressive symptoms, verbalize feelings of improved mood/affect, and adopt new coping skills prior to discharge  Outcome: Progressing  Goal: Refrain from isolation  Description: Interventions:  - Develop a trusting relationship   - Encourage socialization   Outcome: Progressing  Goal: Refrain from self-neglect  Outcome: Progressing  Goal: Complete daily ADLs, including personal hygiene independently, as able  Description: Interventions:  - Observe, teach, and assist patient with ADLS  -  Monitor and promote a balance of rest/activity, with adequate nutrition and elimination   Outcome: Progressing     Problem: Anxiety  Goal: Anxiety is at manageable level  Description: Interventions:  - Assess and monitor patient's anxiety level. - Monitor for signs and symptoms (heart palpitations, chest pain, shortness of breath, headaches, nausea, feeling jumpy, restlessness, irritable, apprehensive). - Collaborate with interdisciplinary team and initiate plan and interventions as ordered.   - Martelle patient to unit/surroundings  - Explain treatment plan  - Encourage participation in care  - Encourage verbalization of concerns/fears  - Identify coping mechanisms  - Assist in developing anxiety-reducing skills  - Administer/offer alternative therapies  - Limit or eliminate stimulants  Outcome: Progressing     Problem: DISCHARGE PLANNING - CARE MANAGEMENT  Goal: Discharge to post-acute care or home with appropriate resources  Description: INTERVENTIONS:  - Conduct assessment to determine patient/family and health care team treatment goals, and need for post-acute services based on payer coverage, community resources, and patient preferences, and barriers to discharge  - Address psychosocial, clinical, and financial barriers to discharge as identified in assessment in conjunction with the patient/family and health care team  - Arrange appropriate level of post-acute services according to patient’s   needs and preference and payer coverage in collaboration with the physician and health care team  - Communicate with and update the patient/family, physician, and health care team regarding progress on the discharge plan  - Arrange appropriate transportation to post-acute venues  Outcome: Progressing     Problem: Nutrition/Hydration-ADULT  Goal: Nutrient/Hydration intake appropriate for improving, restoring or maintaining nutritional needs  Description: Monitor and assess patient's nutrition/hydration status for malnutrition. Collaborate with interdisciplinary team and initiate plan and interventions as ordered. Monitor patient's weight and dietary intake as ordered or per policy. Utilize nutrition screening tool and intervene as necessary. Determine patient's food preferences and provide high-protein, high-caloric foods as appropriate.      INTERVENTIONS:  - Monitor oral intake, urinary output, labs, and treatment plans  - Assess nutrition and hydration status and recommend course of action  - Evaluate amount of meals eaten  - Assist patient with eating if necessary   - Allow adequate time for meals  - Recommend/ encourage appropriate diets, oral nutritional supplements, and vitamin/mineral supplements  - Order, calculate, and assess calorie counts as needed  - Recommend, monitor, and adjust tube feedings and TPN/PPN based on assessed needs  - Assess need for intravenous fluids  - Provide specific nutrition/hydration education as appropriate  - Include patient/family/caregiver in decisions related to nutrition  Outcome: Progressing

## 2023-10-10 NOTE — NURSING NOTE
Patient was observed to be visible in the community this evening; spent time coloring in the small dining area. Blanquita Yee is pleasant and calm during staff interactions. Positive for snack and fluids tonight. Endorses mild anxiety and depression, denies SI, HI and hallucinations. Denies any pain. She was medication compliant at ; she also requested and received PRN Atarax for c/o inability to sleep. Administered as per order for insomnia. No acute behaviors observed. Continuous safety rounding in progress.

## 2023-10-10 NOTE — NURSING NOTE
Patient has been visible on the unit. She is pleasant and cooperative. Social with staff and peers. Attends groups. She slept last night. Denied depression,SI,HI, or hallucinations. Medication compliant. Q 7 minute safety checks maintained.

## 2023-10-10 NOTE — PROGRESS NOTES
Progress Note - Behavioral Health   Christiano Prom 50 y.o. female MRN: [id number]  Unit/Bed#: Jayla Major 201-01 Encounter: [id number]    Assessment/Plan   Principal Problem:    Severe episode of recurrent major depressive disorder, without psychotic features ([address])  Active Problems:    Type 2 diabetes mellitus with hyperglycemia, with long-term current use of insulin ([address])    History of DVT (deep vein thrombosis)    History of TIA (transient ischemic attack)    Morbid obesity ([address])    Essential hypertension    AURELIO (obstructive sleep apnea)    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)    Bell's palsy    Mild persistent asthma without complication    Gastroesophageal reflux disease without esophagitis    Generalized anxiety disorder    PTSD (post-traumatic stress disorder)      Behavior over the last 24 hours: Improving  Sleep: normal  Appetite: normal  Medication side effects: No  ROS: no complaints and all other systems are negative    Subjective: Renu Watts was seen today for psychiatric follow-up. Patient calm, cooperative. She is visible on the unit social with select peers. Patient appears less depressed and anxious. Per patient "my  told me I got a letter in the mail saying and my disability has been reinstated."  Patient appears less depressed and anxious today. She is compliant with her current medication regimen. She denied any SI/HI/AVH. She did not appear to be responding to internal stimuli.     Mental Status Evaluation:  Appearance:  age appropriate and casually dressed   Behavior:  cooperative   Speech:  normal pitch and normal volume   Mood:  less anxious and depressed   Affect:  mood-congruent   Thought Process:  goal directed   Associations: intact associations   Thought Content:  no overt delusions   Perceptual Disturbances: denied AVH, did not appear internally preoccupied   Risk Potential: Suicidal Ideations none at present  Homicidal Ideations none at present  Potential for Aggression No Sensorium:  person, place and time/date   Memory:  recent and remote memory grossly intact   Consciousness:  alert and awake    Attention: attention span appeared shorter than expected for age   Insight:  fair   Judgment: fair   Gait/Station: normal gait/station   Motor Activity: no abnormal movements     Progress Toward Goals: Improving. Patient appears less anxious and ruminative in thought. She is tolerating increase in Neurontin well. She denied side effects from current psychotropic medication regimen. Will continue current psychotropic medication regimen. No discharge date at this time. Recommended Treatment: Continue with group therapy, milieu therapy and occupational therapy. Risks, benefits and possible side effects of Medications:   Risks, benefits, and possible side effects of medications explained to patient and patient verbalizes understanding.       Medications:   all current active meds have been reviewed and current meds:   Current Facility-Administered Medications   Medication Dose Route Frequency   • acetaminophen (TYLENOL) tablet 650 mg  650 mg Oral Q6H PRN   • albuterol (PROVENTIL HFA,VENTOLIN HFA) inhaler 2 puff  2 puff Inhalation Q4H PRN   • atorvastatin (LIPITOR) tablet 10 mg  10 mg Oral Daily With Dinner   • benztropine (COGENTIN) tablet 0.5 mg  0.5 mg Oral Q4H PRN Max 6/day   • [START ON 11/12/2023] cholecalciferol (VITAMIN D3) tablet 1,000 Units  1,000 Units Oral Daily   • cyanocobalamin injection 1,000 mcg  1,000 mcg Intramuscular Q30 Days   • doxepin (SINEquan) capsule 25 mg  25 mg Oral HS   • Empagliflozin TABS 25 mg  25 mg Oral QAM   • ergocalciferol (VITAMIN D2) capsule 50,000 Units  50,000 Units Oral Weekly   • fluticasone (FLONASE) 50 mcg/act nasal spray 1 spray  1 spray Nasal Daily   • gabapentin (NEURONTIN) capsule 200 mg  200 mg Oral TID   • haloperidol lactate (HALDOL) injection 5 mg  5 mg Intramuscular Q4H PRN Max 4/day   • hydrOXYzine HCL (ATARAX) tablet 100 mg 100 mg Oral HS PRN   • hydrOXYzine HCL (ATARAX) tablet 50 mg  50 mg Oral Q6H PRN Max 4/day   • insulin detemir (LEVEMIR) subcutaneous injection 36 Units  36 Units Subcutaneous HS   • insulin lispro (HumaLOG) 100 units/mL subcutaneous injection 2-12 Units  2-12 Units Subcutaneous HS   • insulin lispro (HumaLOG) 100 units/mL subcutaneous injection 2-12 Units  2-12 Units Subcutaneous TID AC   • LORazepam (ATIVAN) injection 1 mg  1 mg Intramuscular Q6H PRN Max 3/day   • LORazepam (ATIVAN) tablet 0.5 mg  0.5 mg Oral Q6H PRN Max 4/day   • LORazepam (ATIVAN) tablet 1 mg  1 mg Oral Q6H PRN Max 3/day   • melatonin tablet 3 mg  3 mg Oral HS PRN   • multivitamin stress formula tablet 1 tablet  1 tablet Oral Daily   • nicotine (NICODERM CQ) 21 mg/24 hr TD 24 hr patch 1 patch  1 patch Transdermal Daily   • nicotine polacrilex (NICORETTE) gum 4 mg  4 mg Oral Q2H PRN   • nystatin (MYCOSTATIN) powder   Topical BID   • pantoprazole (PROTONIX) EC tablet 40 mg  40 mg Oral Early Morning   • phenazopyridine (PYRIDIUM) tablet 100 mg  100 mg Oral TID PRN   • risperiDONE (RisperDAL) tablet 0.25 mg  0.25 mg Oral Q4H PRN Max 6/day   • risperiDONE (RisperDAL) tablet 0.5 mg  0.5 mg Oral Q4H PRN Max 3/day   • risperiDONE (RisperDAL) tablet 1 mg  1 mg Oral Q2H PRN Max 3/day   • semaglutide (1 mg/dose) (Ozempic) injection pen 1 mg  1 mg Subcutaneous Q7 Days   • venlafaxine (EFFEXOR-XR) 24 hr capsule 150 mg  150 mg Oral Daily   . Labs: I have personally reviewed all pertinent laboratory/tests results.    Most Recent Labs:   Lab Results   Component Value Date    WBC 6.58 10/02/2023    RBC 5.32 (H) 10/02/2023    HGB 15.2 10/02/2023    HCT 47.3 (H) 10/02/2023     10/02/2023    RDW 13.0 10/02/2023    NEUTROABS 4.35 10/02/2023    SODIUM 139 10/02/2023    K 4.0 10/02/2023     10/02/2023    CO2 23 10/02/2023    BUN 12 10/02/2023    CREATININE 0.95 10/02/2023    GLUC 135 10/02/2023    GLUF 114 (H) 06/20/2023    CALCIUM 9.1 10/02/2023    AST 19 10/02/2023    ALT 16 10/02/2023    ALKPHOS 75 10/02/2023    TP 7.2 10/02/2023    ALB 4.3 10/02/2023    TBILI 0.80 10/02/2023    CHOLESTEROL 155 09/01/2022    HDL 37 (L) 09/01/2022    TRIG 140 09/01/2022    LDLCALC 90 09/01/2022    NONHDLC 151 09/30/2021    [id number] 0.902 10/02/2023    PREGSERUM Negative 10/02/2023    HGBA1C 6.7 (H) 10/05/2023     10/05/2023       Counseling / Coordination of Care

## 2023-10-10 NOTE — NURSING NOTE
Patient in bed at this time, appears to be sleeping. No further c/o inability to sleep voiced by Rosaura Chavez. PRN Atarax 100 mg given for insomnia as per order appears to be effective for this patient.

## 2023-10-11 DIAGNOSIS — E66.01 MORBID OBESITY (HCC): ICD-10-CM

## 2023-10-11 DIAGNOSIS — Z79.4 TYPE 2 DIABETES MELLITUS WITHOUT COMPLICATION, WITH LONG-TERM CURRENT USE OF INSULIN (HCC): ICD-10-CM

## 2023-10-11 DIAGNOSIS — E11.9 TYPE 2 DIABETES MELLITUS WITHOUT COMPLICATION, WITH LONG-TERM CURRENT USE OF INSULIN (HCC): ICD-10-CM

## 2023-10-11 DIAGNOSIS — E11.9 TYPE 2 DIABETES MELLITUS WITHOUT COMPLICATION, WITHOUT LONG-TERM CURRENT USE OF INSULIN (HCC): ICD-10-CM

## 2023-10-11 PROBLEM — F33.2 SEVERE EPISODE OF RECURRENT MAJOR DEPRESSIVE DISORDER, WITHOUT PSYCHOTIC FEATURES (HCC): Status: RESOLVED | Noted: 2023-10-08 | Resolved: 2023-10-11

## 2023-10-11 PROBLEM — F43.10 PTSD (POST-TRAUMATIC STRESS DISORDER): Status: RESOLVED | Noted: 2023-10-08 | Resolved: 2023-10-11

## 2023-10-11 PROBLEM — F41.1 GENERALIZED ANXIETY DISORDER: Status: RESOLVED | Noted: 2023-10-08 | Resolved: 2023-10-11

## 2023-10-11 LAB
GLUCOSE SERPL-MCNC: 110 MG/DL (ref 65–140)
GLUCOSE SERPL-MCNC: 118 MG/DL (ref 65–140)
GLUCOSE SERPL-MCNC: 154 MG/DL (ref 65–140)
GLUCOSE SERPL-MCNC: 90 MG/DL (ref 65–140)

## 2023-10-11 PROCEDURE — 99232 SBSQ HOSP IP/OBS MODERATE 35: CPT

## 2023-10-11 PROCEDURE — 82948 REAGENT STRIP/BLOOD GLUCOSE: CPT

## 2023-10-11 RX ORDER — ATORVASTATIN CALCIUM 10 MG/1
10 TABLET, FILM COATED ORAL DAILY
Qty: 30 TABLET | Refills: 0 | Status: SHIPPED | OUTPATIENT
Start: 2023-10-11 | End: 2023-10-18 | Stop reason: SDUPTHER

## 2023-10-11 RX ORDER — HYDROXYZINE 50 MG/1
100 TABLET, FILM COATED ORAL
Status: DISCONTINUED | OUTPATIENT
Start: 2023-10-11 | End: 2023-10-12 | Stop reason: HOSPADM

## 2023-10-11 RX ORDER — NYSTATIN 100000 [USP'U]/G
POWDER TOPICAL 2 TIMES DAILY
Qty: 30 G | Refills: 0 | Status: SHIPPED | OUTPATIENT
Start: 2023-10-11

## 2023-10-11 RX ORDER — NICOTINE 21 MG/24HR
1 PATCH, TRANSDERMAL 24 HOURS TRANSDERMAL DAILY
Qty: 28 PATCH | Refills: 0 | Status: SHIPPED | OUTPATIENT
Start: 2023-10-12

## 2023-10-11 RX ORDER — VENLAFAXINE HYDROCHLORIDE 150 MG/1
150 CAPSULE, EXTENDED RELEASE ORAL DAILY
Qty: 30 CAPSULE | Refills: 0 | Status: SHIPPED | OUTPATIENT
Start: 2023-10-12

## 2023-10-11 RX ORDER — SEMAGLUTIDE 1.34 MG/ML
INJECTION, SOLUTION SUBCUTANEOUS
Qty: 3 ML | Refills: 1 | Status: SHIPPED | OUTPATIENT
Start: 2023-10-11

## 2023-10-11 RX ORDER — MELATONIN
1000 DAILY
Qty: 30 TABLET | Refills: 0 | Status: SHIPPED | OUTPATIENT
Start: 2023-11-12

## 2023-10-11 RX ORDER — ERGOCALCIFEROL 1.25 MG/1
50000 CAPSULE ORAL WEEKLY
Qty: 5 CAPSULE | Refills: 0 | Status: SHIPPED | OUTPATIENT
Start: 2023-10-14 | End: 2023-11-12

## 2023-10-11 RX ORDER — HYDROXYZINE 50 MG/1
100 TABLET, FILM COATED ORAL
Qty: 60 TABLET | Refills: 0 | Status: SHIPPED | OUTPATIENT
Start: 2023-10-11

## 2023-10-11 RX ORDER — DOXEPIN HYDROCHLORIDE 25 MG/1
25 CAPSULE ORAL
Qty: 30 CAPSULE | Refills: 0 | Status: SHIPPED | OUTPATIENT
Start: 2023-10-11

## 2023-10-11 RX ORDER — INSULIN DETEMIR 100 [IU]/ML
INJECTION, SOLUTION SUBCUTANEOUS
Qty: 30 ML | Refills: 1 | Status: SHIPPED | OUTPATIENT
Start: 2023-10-11

## 2023-10-11 RX ORDER — OMEPRAZOLE 40 MG/1
40 CAPSULE, DELAYED RELEASE ORAL DAILY
Qty: 30 CAPSULE | Refills: 0 | Status: SHIPPED | OUTPATIENT
Start: 2023-10-11 | End: 2023-10-25

## 2023-10-11 RX ORDER — CYANOCOBALAMIN 1000 UG/ML
1000 INJECTION, SOLUTION INTRAMUSCULAR; SUBCUTANEOUS
Qty: 1 ML | Refills: 0 | Status: SHIPPED | OUTPATIENT
Start: 2023-11-06

## 2023-10-11 RX ORDER — GABAPENTIN 100 MG/1
200 CAPSULE ORAL 3 TIMES DAILY
Qty: 180 CAPSULE | Refills: 0 | Status: SHIPPED | OUTPATIENT
Start: 2023-10-11

## 2023-10-11 RX ADMIN — GABAPENTIN 200 MG: 100 CAPSULE ORAL at 21:17

## 2023-10-11 RX ADMIN — HYDROXYZINE HYDROCHLORIDE 100 MG: 50 TABLET, FILM COATED ORAL at 21:16

## 2023-10-11 RX ADMIN — SEMAGLUTIDE 1 MG: 1.34 INJECTION, SOLUTION SUBCUTANEOUS at 17:34

## 2023-10-11 RX ADMIN — NICOTINE 1 PATCH: 21 PATCH, EXTENDED RELEASE TRANSDERMAL at 08:32

## 2023-10-11 RX ADMIN — B-COMPLEX W/ C & FOLIC ACID TAB 1 TABLET: TAB at 08:33

## 2023-10-11 RX ADMIN — GABAPENTIN 200 MG: 100 CAPSULE ORAL at 08:33

## 2023-10-11 RX ADMIN — FLUTICASONE PROPIONATE 1 SPRAY: 50 SPRAY, METERED NASAL at 08:32

## 2023-10-11 RX ADMIN — VENLAFAXINE HYDROCHLORIDE 150 MG: 150 CAPSULE, EXTENDED RELEASE ORAL at 08:33

## 2023-10-11 RX ADMIN — ATORVASTATIN CALCIUM 10 MG: 10 TABLET, FILM COATED ORAL at 17:34

## 2023-10-11 RX ADMIN — NYSTATIN: 100000 POWDER TOPICAL at 17:36

## 2023-10-11 RX ADMIN — DOXEPIN HYDROCHLORIDE 25 MG: 25 CAPSULE ORAL at 21:17

## 2023-10-11 RX ADMIN — GABAPENTIN 200 MG: 100 CAPSULE ORAL at 17:34

## 2023-10-11 RX ADMIN — EMPAGLIFLOZIN 25 MG: 25 TABLET, FILM COATED ORAL at 08:33

## 2023-10-11 RX ADMIN — INSULIN DETEMIR 36 UNITS: 100 INJECTION, SOLUTION SUBCUTANEOUS at 21:18

## 2023-10-11 RX ADMIN — PANTOPRAZOLE SODIUM 40 MG: 40 TABLET, DELAYED RELEASE ORAL at 05:49

## 2023-10-11 NOTE — DISCHARGE INSTR - OTHER ORDERS
Rolando Watson, you are being discharged to your home at [address] [facility] in [address] [facility] [address]. Your contact number is [phone]. Triggers you have identified during your hospitalization that led to your admission include a distressed mood. Coping skills you have identified during your hospitalization include crocheting. If you are unable to deal with your distressed mood alone please contact your psychiatric provider at [phone], your primary care provider at [facility] at [phone], or your  Gior[facility] Rojo at [phone]. If that is not effective and you continue to have a distressed mood, feel overwhelmed, or are in crisis, please contact Corona García at [phone], dial [phone], or go to the nearest emergency center. [address] [address]  [phone]    Nicko Schneider is a confidential 24/7 telephone support service manned by trained mental health consumers. [facility] provides support, a listening ear and can provide information about available services. [facility] specializes in the concerns of mental health consumers, their families and friends. However, we are also here for anyone who has a mental health concern, is confused about or just doesn't know anything about mental health or where to get information. To reach Nicko Schneider, call [phone]. [facility] Crisis Hotline: [address] Suicide Prevention Lifeline:  5-434.155.4223  *Alcohol Anonymous: 120.873.9301  *[place] [facility] [employer] Commission: (130) 251-9005  [address] on [address] [facility] ([facility]) HELPLINE: 785.639.6258/Website: www.sandor.org  *Substance Abuse and [address] Administration([facility]) [employer], which is a confidential, free, 24-hour-a-day, 365-day-a-year, information service for individuals and family members facing mental health and/or substance use disorders.  This service provides referrals to local treatment facilities, support groups, and community-based organizations. Callers can also order free publications and other information. Call 3-454.923.7907/Website: www.sama.gov  *[facility] 2-1-1: This is a toll free, confidential, 24-hour-a-day service which connects you to a community  in your area who can help you find services and resources that are available to you locally and provide critical services that can improve and save lives. Call: 211  /Website: https://[employer] Alleycarlita.net/    Yolanda Kirby or Harper, juani Collado and Orion, will be calling you after your discharge, on the phone number that you provided. They will be available as an additional support, if needed. If you wish to speak with one of them, you may contact Yolanda Kirby at [phone] or Renetta Velez at [phone].

## 2023-10-11 NOTE — SOCIAL WORK
LEANNE placed 2nd call to pt's psych provider:    [address] [place] [address] Psychiatry (psych)  [address]  Ph: [phone]    Call went to [email or web address] sent email requesting [facility] appt  for pt and fax number. LEANNE rc'd vm from [employer] at [address] [place] [address] Psychiatry. Scheduled pt on 10/19/23 @ 2:30 p.m. provided fax number for d/c summary: [phone].

## 2023-10-11 NOTE — NURSING NOTE
Patient was observed to be visible in the community this evening; spending time in the small dining area with peers. She was observed playing cards. Yisel Soto is calm and cooperative during staff interactions. No acute behaviors observed. She denies feeling anxious and/ or depressed, denies SI, HI and hallucinations. Patient was medication compliant at HS; she received scheduled Levemir insulin as per order, however we held sliding scale insulin coverage for . She also requested and received PRN Atarax 100 mg as per order for insomnia. Continuous safety rounding in progress.

## 2023-10-11 NOTE — PROGRESS NOTES
Progress Note - Behavioral Health   Parminder Hilario 48 y.o. female MRN: [id number]  Unit/Bed#: [facility] 201-01 Encounter: [id number]    Assessment/Plan   Principal Problem:    Severe episode of recurrent major depressive disorder, without psychotic features ([address])  Active Problems:    Type 2 diabetes mellitus with hyperglycemia, with long-term current use of insulin ([address])    History of DVT (deep vein thrombosis)    History of TIA (transient ischemic attack)    Morbid obesity ([address])    Essential hypertension    AURELIO (obstructive sleep apnea)    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)    Bell's palsy    Mild persistent asthma without complication    Gastroesophageal reflux disease without esophagitis    Generalized anxiety disorder    PTSD (post-traumatic stress disorder)      Behavior over the last 24 hours: Improved  Sleep: normal  Appetite: normal  Medication side effects: No  ROS: no complaints and all other systems are negative    Subjective: Hanna Askew was seen today for psychiatric follow-up. Patient calm, cooperative. She is visible on the unit social with peers. She continues to appear less depressed and anxious. Her mood is controlled on the unit with no recent aggression or agitation toward staff or peers. She is compliant with her current medication regimen. She denied any sleep disturbance SI/HI/AVH. She did not appear internally preoccupied.     Mental Status Evaluation:  Appearance:  age appropriate and casually dressed   Behavior:  Calm, cooperative   Speech:  normal pitch and normal volume   Mood:  euthymic   Affect:  mood-congruent   Thought Process:  goal directed   Associations: intact associations   Thought Content:  no overt delusions   Perceptual Disturbances: denied AVH, did not appear internally preoccupied   Risk Potential: Suicidal Ideations none at present  Homicidal Ideations none at present  Potential for Aggression No   Sensorium:  person, place and time/date   Memory:  recent and remote memory grossly intact   Consciousness:  alert and awake    Attention: attention span appeared shorter than expected for age   Insight:  good   Judgment: good   Gait/Station: normal gait/station   Motor Activity: no abnormal movements     Progress Toward Goals: Improved. Patient continues to exhibit a controlled mood on the unit. She appears less ruminative and negative in thought. She is compliant with her current psychotropic medication regimen. She denied side effects from her current psychotropic medication regimen. Anticipated discharge on 10/12/2023. Recommended Treatment: Continue with group therapy, milieu therapy and occupational therapy. Risks, benefits and possible side effects of Medications:   Risks, benefits, and possible side effects of medications explained to patient and patient verbalizes understanding.       Medications:   all current active meds have been reviewed and current meds:   Current Facility-Administered Medications   Medication Dose Route Frequency    acetaminophen (TYLENOL) tablet 650 mg  650 mg Oral Q6H PRN    albuterol (PROVENTIL HFA,VENTOLIN HFA) inhaler 2 puff  2 puff Inhalation Q4H PRN    atorvastatin (LIPITOR) tablet 10 mg  10 mg Oral Daily With Dinner    benztropine (COGENTIN) tablet 0.5 mg  0.5 mg Oral Q4H PRN Max 6/day    [START ON 11/12/2023] cholecalciferol (VITAMIN D3) tablet 1,000 Units  1,000 Units Oral Daily    cyanocobalamin injection 1,000 mcg  1,000 mcg Intramuscular Q30 Days    doxepin (SINEquan) capsule 25 mg  25 mg Oral HS    Empagliflozin TABS 25 mg  25 mg Oral QAM    ergocalciferol (VITAMIN D2) capsule 50,000 Units  50,000 Units Oral Weekly    fluticasone (FLONASE) 50 mcg/act nasal spray 1 spray  1 spray Nasal Daily    gabapentin (NEURONTIN) capsule 200 mg  200 mg Oral TID    haloperidol lactate (HALDOL) injection 5 mg  5 mg Intramuscular Q4H PRN Max 4/day    hydrOXYzine HCL (ATARAX) tablet 100 mg  100 mg Oral HS PRN    hydrOXYzine HCL (ATARAX) tablet 50 mg  50 mg Oral Q6H PRN Max 4/day    insulin detemir (LEVEMIR) subcutaneous injection 36 Units  36 Units Subcutaneous HS    insulin lispro (HumaLOG) 100 units/mL subcutaneous injection 2-12 Units  2-12 Units Subcutaneous HS    insulin lispro (HumaLOG) 100 units/mL subcutaneous injection 2-12 Units  2-12 Units Subcutaneous TID AC    LORazepam (ATIVAN) injection 1 mg  1 mg Intramuscular Q6H PRN Max 3/day    LORazepam (ATIVAN) tablet 0.5 mg  0.5 mg Oral Q6H PRN Max 4/day    LORazepam (ATIVAN) tablet 1 mg  1 mg Oral Q6H PRN Max 3/day    melatonin tablet 3 mg  3 mg Oral HS PRN    multivitamin stress formula tablet 1 tablet  1 tablet Oral Daily    nicotine (NICODERM CQ) 21 mg/24 hr TD 24 hr patch 1 patch  1 patch Transdermal Daily    nicotine polacrilex (NICORETTE) gum 4 mg  4 mg Oral Q2H PRN    nystatin (MYCOSTATIN) powder   Topical BID    pantoprazole (PROTONIX) EC tablet 40 mg  40 mg Oral Early Morning    phenazopyridine (PYRIDIUM) tablet 100 mg  100 mg Oral TID PRN    risperiDONE (RisperDAL) tablet 0.25 mg  0.25 mg Oral Q4H PRN Max 6/day    risperiDONE (RisperDAL) tablet 0.5 mg  0.5 mg Oral Q4H PRN Max 3/day    risperiDONE (RisperDAL) tablet 1 mg  1 mg Oral Q2H PRN Max 3/day    semaglutide (1 mg/dose) (Ozempic) injection pen 1 mg  1 mg Subcutaneous Q7 Days    venlafaxine (EFFEXOR-XR) 24 hr capsule 150 mg  150 mg Oral Daily   . Labs: I have personally reviewed all pertinent laboratory/tests results.    Most Recent Labs:   Lab Results   Component Value Date    WBC 6.58 10/02/2023    RBC 5.32 (H) 10/02/2023    HGB 15.2 10/02/2023    HCT 47.3 (H) 10/02/2023     10/02/2023    RDW 13.0 10/02/2023    NEUTROABS 4.35 10/02/2023    SODIUM 139 10/02/2023    K 4.0 10/02/2023     10/02/2023    CO2 23 10/02/2023    BUN 12 10/02/2023    CREATININE 0.95 10/02/2023    GLUC 135 10/02/2023    GLUF 114 (H) 06/20/2023    CALCIUM 9.1 10/02/2023    AST 19 10/02/2023    ALT 16 10/02/2023    ALKPHOS 75 10/02/2023    TP 7.2 10/02/2023    ALB 4.3 10/02/2023    TBILI 0.80 10/02/2023    CHOLESTEROL 155 09/01/2022    HDL 37 (L) 09/01/2022    TRIG 140 09/01/2022    LDLCALC 90 09/01/2022    NONHDLC 151 09/30/2021    [id number] 0.902 10/02/2023    PREGSERUM Negative 10/02/2023    HGBA1C 6.7 (H) 10/05/2023     10/05/2023       Counseling / Coordination of Care

## 2023-10-11 NOTE — PLAN OF CARE
Problem: Ineffective Coping  Goal: Demonstrates healthy coping skills  Outcome: Progressing  Goal: Participates in unit activities  Description: Interventions:  - Provide therapeutic environment   - Provide required programming   - Redirect inappropriate behaviors   Outcome: Progressing  Goal: Patient/Family participate in treatment and DC plans  Description: Interventions:  - Provide therapeutic environment  Outcome: Progressing  Goal: Patient/Family verbalizes awareness of resources  Outcome: Progressing     Problem: Risk for Self Injury/Neglect  Goal: Treatment Goal: Remain safe during length of stay, learn and adopt new coping skills, and be free of self-injurious ideation, impulses and acts at the time of discharge  Outcome: Progressing  Goal: Verbalize thoughts and feelings  Description: Interventions:  - Assess and re-assess patient's lethality and potential for self-injury  - Engage patient in 1:1 interactions, daily, for a minimum of 15 minutes  - Encourage patient to express feelings, fears, frustrations, hopes  - Establish rapport/trust with patient   Outcome: Progressing  Goal: Refrain from harming self  Description: Interventions:  - Monitor patient closely, per order  - Develop a trusting relationship  - Supervise medication ingestion, monitor effects and side effects   Outcome: Progressing  Goal: Complete daily ADLs, including personal hygiene independently, as able  Description: Interventions:  - Observe, teach, and assist patient with ADLS  - Monitor and promote a balance of rest/activity, with adequate nutrition and elimination  Outcome: Progressing     Problem: Depression  Goal: Treatment Goal: Demonstrate behavioral control of depressive symptoms, verbalize feelings of improved mood/affect, and adopt new coping skills prior to discharge  Outcome: Progressing  Goal: Refrain from isolation  Description: Interventions:  - Develop a trusting relationship   - Encourage socialization   Outcome: Progressing  Goal: Refrain from self-neglect  Outcome: Progressing  Goal: Complete daily ADLs, including personal hygiene independently, as able  Description: Interventions:  - Observe, teach, and assist patient with ADLS  -  Monitor and promote a balance of rest/activity, with adequate nutrition and elimination   Outcome: Progressing     Problem: Anxiety  Goal: Anxiety is at manageable level  Description: Interventions:  - Assess and monitor patient's anxiety level. - Monitor for signs and symptoms (heart palpitations, chest pain, shortness of breath, headaches, nausea, feeling jumpy, restlessness, irritable, apprehensive). - Collaborate with interdisciplinary team and initiate plan and interventions as ordered.   - [place] patient to unit/surroundings  - Explain treatment plan  - Encourage participation in care  - Encourage verbalization of concerns/fears  - Identify coping mechanisms  - Assist in developing anxiety-reducing skills  - Administer/offer alternative therapies  - Limit or eliminate stimulants  Outcome: Progressing     Problem: DISCHARGE PLANNING - CARE MANAGEMENT  Goal: Discharge to post-acute care or home with appropriate resources  Description: INTERVENTIONS:  - Conduct assessment to determine patient/family and health care team treatment goals, and need for post-acute services based on payer coverage, community resources, and patient preferences, and barriers to discharge  - Address psychosocial, clinical, and financial barriers to discharge as identified in assessment in conjunction with the patient/family and health care team  - Arrange appropriate level of post-acute services according to patient’s   needs and preference and payer coverage in collaboration with the physician and health care team  - Communicate with and update the patient/family, physician, and health care team regarding progress on the discharge plan  - Arrange appropriate transportation to post-acute venues  Outcome: Progressing     Problem: Nutrition/Hydration-ADULT  Goal: Nutrient/Hydration intake appropriate for improving, restoring or maintaining nutritional needs  Description: Monitor and assess patient's nutrition/hydration status for malnutrition. Collaborate with interdisciplinary team and initiate plan and interventions as ordered. Monitor patient's weight and dietary intake as ordered or per policy. Utilize nutrition screening tool and intervene as necessary. Determine patient's food preferences and provide high-protein, high-caloric foods as appropriate.      INTERVENTIONS:  - Monitor oral intake, urinary output, labs, and treatment plans  - Assess nutrition and hydration status and recommend course of action  - Evaluate amount of meals eaten  - Assist patient with eating if necessary   - Allow adequate time for meals  - Recommend/ encourage appropriate diets, oral nutritional supplements, and vitamin/mineral supplements  - Order, calculate, and assess calorie counts as needed  - Recommend, monitor, and adjust tube feedings and TPN/PPN based on assessed needs  - Assess need for intravenous fluids  - Provide specific nutrition/hydration education as appropriate  - Include patient/family/caregiver in decisions related to nutrition  Outcome: Progressing

## 2023-10-11 NOTE — NURSING NOTE
Patient is visible in milieu for breakfast and groups. Denies anxiety and depression. Denies SI/HI/AVH. Affect is congruent with mood. Mood is euthymic. Appropriate in conversation. Pleasant and cooperative. Patient compliant with medications. No acute behaviors noted. Q 7 min safety checks maintained. Fall precautions maintained. Monitoring continues.

## 2023-10-11 NOTE — DISCHARGE SUMMARY
Discharge Summary - [address] [facility] 48 y.o. female MRN: [id number]  Unit/Bed#: Manuel Orellana 201-01 Encounter: [id number]     Admission Date: 10/4/2023         Discharge Date: 10/12/2023    Attending Psychiatrist: Aleshia Rosario MD    Reason for Admission/HPI: Major depressive disorder, single episode, unspecified [F32.9]  Anxiety and depression [F41.9, F32.A]    According to H&P of Dr. Eileen Charlton    Patient is a 48 y.o. female presented with a history of depression and anxiety, complicated with chronic medical condition including diabetes, hyperglycemia, GERD and melanoma in his left eye who was admitted to the inpatient older adult psychiatric unit on a voluntary 61 51 81 commitment basis due to depression, anxiety, unstable mood and suicidal ideation with plan to shoot self with a gun. UDS was negative, EKG with no acute changes  On evaluation in the inpatient psychiatric unit Gilda Rees presents depressed, anxious, tearful, delayed speech but able to be engaged in appropriate conversation. She endorses increased anxiety, depressed mood, worsening of hopeless, helplessness, struggling with constant rumination about ongoing life stressors. She admits to suicidal ideation with plan to shoot herself with a gun. Denies any active plan or intent to hurt herself and she is able to contract for safety in the unit. Patient states her major stressors are financial, Social Security denying her case along with multiple medical condition including cancer in her eye, diagnosed 7 years ago. Claims her treating physician told her   she has life expectancy of 5 to 15 years. She reports one admission to psych unit after she had her son due to postpartum depression. Her primary care physician has been prescribing her medication Doxepin, Atarax and Effexor and she no longer take Lexapro and BuSpar due to side effects.  Patient admits she has a history of alcohol or illegal drug use prior to her pregnancy but she has been in remission over past 20 years. Patient agreed to be compliant with medication and treatment plan. Hospital Course: The patient was admitted to the inpatient psychiatric unit and started on every 7 minutes precautions. During the hospitalization the patient was attending individual therapy, group therapy, milieu therapy and occupational therapy. Psychiatric medications were titrated over the hospital stay. To address depressive symptoms, anxiety symptoms, and insomnia the patient was started on  Medication doses were titrated during the hospital course. Prior to beginning of treatment medications risks and benefits and possible side effects including risk of suicidality and serotonin syndrome related to treatment with antidepressants were reviewed with the patient. The patient verbalized understanding and agreement for treatment. Patient's symptoms improved gradually over the hospital course. At the end of treatment the patient was doing well. Mood was stable at the time of discharge. The patient denied suicidal ideation, intent or plan at the time of discharge and denied homicidal ideation, intent or plan at the time of discharge. There was no overt psychosis at the time of discharge. Sleep and appetite were improved. The patient was tolerating medications and was not reporting any significant side effects at the time of discharge. Since the patient was doing well at the end of the hospitalization, treatment team felt that the patient could be safely discharged to outpatient care. The outpatient follow up with [facility] was arranged by the unit  upon discharge.     Mental Status at time of Discharge:     Appearance:  age appropriate and casually dressed   Behavior:  Euthymic   Speech:  normal pitch and normal volume   Mood:  normal   Affect:  mood-congruent   Thought Process:  goal directed   Thought Content:  No overt delusions   Perceptual Disturbances: Denied AVH, did not appear internally preoccupied   Risk Potential: None   Sensorium:  person, place, and time/date   Cognition:  recent and remote memory grossly intact   Consciousness:  alert and awake    Attention: attention span and concentration were age appropriate   Insight:  good   Judgment: good   Gait/Station: normal gait/station   Motor Activity: no abnormal movements     Admission Diagnosis:Major depressive disorder, single episode, unspecified [F32.9]  Anxiety and depression [F41.9, F32.A]    Discharge Diagnosis:   Principal Problem (Resolved):    Severe episode of recurrent major depressive disorder, without psychotic features ([address])  Active Problems:    Type 2 diabetes mellitus with hyperglycemia, with long-term current use of insulin ([facility])    History of DVT (deep vein thrombosis)    History of TIA (transient ischemic attack)    Morbid obesity ([address])    Essential hypertension    AURELIO (obstructive sleep apnea)    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 ([facility])    Bell's palsy    Mild persistent asthma without complication    Gastroesophageal reflux disease without esophagitis  Resolved Problems:    Generalized anxiety disorder    PTSD (post-traumatic stress disorder)        Lab results:  Admission on 10/04/2023   Component Date Value    POC Glucose 10/04/2023 186 (H)     POC Glucose 10/04/2023 117     Folate 10/05/2023 13.9     Hemoglobin A1C 10/05/2023 6.7 (H)     EAG 10/05/2023 146     Vitamin B-12 10/05/2023 381     Vit D, 25-Hydroxy 10/05/2023 21.4 (L)     POC Glucose 10/05/2023 163 (H)     POC Glucose 10/05/2023 169 (H)     POC Glucose 10/05/2023 116     POC Glucose 10/05/2023 144 (H)     POC Glucose 10/06/2023 134     POC Glucose 10/06/2023 171 (H)     POC Glucose 10/06/2023 146 (H)     POC Glucose 10/06/2023 166 (H)     POC Glucose 10/07/2023 110     POC Glucose 10/07/2023 149 (H)     POC Glucose 10/07/2023 135     POC Glucose 10/07/2023 165 (H)     POC Glucose 10/08/2023 109     POC Glucose 10/08/2023 133     POC Glucose 10/08/2023 168 (H)     POC Glucose 10/08/2023 176 (H)     POC Glucose 10/09/2023 94     POC Glucose 10/09/2023 161 (H)     POC Glucose 10/09/2023 180 (H)     POC Glucose 10/09/2023 186 (H)     POC Glucose 10/10/2023 114     POC Glucose 10/10/2023 122     POC Glucose 10/10/2023 112     POC Glucose 10/10/2023 147 (H)     POC Glucose 10/11/2023 90        Discharge Medications:  Current Discharge Medication List        START taking these medications    Details   gabapentin (NEURONTIN) 100 mg capsule Take 2 capsules (200 mg total) by mouth 3 (three) times a day  Qty: 180 capsule, Refills: 0    Associated Diagnoses: Generalized anxiety disorder      Hydroxyzine 50 mg                                      take 2 tablets (100 mg) daily at bedtime                                                                       QTY:60 TABLETS, Refills:0        Current Discharge Medication List        STOP taking these medications       busPIRone (BUSPAR) 10 mg tablet Comments:   Reason for Stopping:         escitalopram (LEXAPRO) 10 mg tablet Comments:   Reason for Stopping:                Current Discharge Medication List        CONTINUE these medications which have CHANGED    Details   doxepin (SINEquan) 25 mg capsule Take 1 capsule (25 mg total) by mouth daily at bedtime  Qty: 30 capsule, Refills: 0    Associated Diagnoses: Severe episode of recurrent major depressive disorder, without psychotic features ([facility])      venlafaxine (EFFEXOR-XR) 150 mg 24 hr capsule Take 1 capsule (150 mg total) by mouth daily Do not start before October 12, 2023. Qty: 30 capsule, Refills: 0    Associated Diagnoses: Severe episode of recurrent major depressive disorder, without psychotic features ([address]);  Generalized anxiety disorder; PTSD (post-traumatic stress disorder)              Current Discharge Medication List        CONTINUE these medications which have NOT CHANGED    Details   acetaminophen (TYLENOL) 500 mg tablet Take 500 mg by mouth every 6 (six) hours as needed for mild pain      albuterol (PROVENTIL HFA,VENTOLIN HFA) 90 mcg/act inhaler inhale 2 puffs by mouth and INTO THE LUNGS every 4 hours if needed for wheezing  Qty: 18 g, Refills: 2    Comments: Substitution to a formulary equivalent within the same pharmaceutical class is authorized. Associated Diagnoses: Mild persistent asthma without complication      atorvastatin (LIPITOR) 10 mg tablet Take 1 tablet (10 mg total) by mouth daily  Qty: 90 tablet, Refills: 3    Associated Diagnoses: Hypercholesterolemia      B-D UF III MINI PEN NEEDLES 31G X 5 MM MISC Use to inject insulin 4 times daily in case of pump failure. Qty: 300 each, Refills: 1    Associated Diagnoses: Type 2 diabetes mellitus without complication, without long-term current use of insulin ([address]); Type 2 diabetes mellitus without complication, with long-term current use of insulin ([facility])      Continuous Blood Gluc  (FREESTYLE MARTINE READER) ORIN 1 Device by Does not apply route 4 (four) times a day  Qty: 1 Device, Refills: 0    Associated Diagnoses: Type 2 diabetes mellitus without complication, without long-term current use of insulin ([facility])      Continuous Blood Gluc Sensor (FreeStyle Martine Sensor System) [facility] Use one sensor every 14 days for continuous glucose monitoring.   Qty: 6 each, Refills: 1    Associated Diagnoses: Type 2 diabetes mellitus without complication, without long-term current use of insulin ([facility])      etonogestrel (NEXPLANON) subdermal implant Inject under the skin      fluticasone (FLONASE) 50 mcg/act nasal spray 1 spray into each nostril daily  Qty: 1 Bottle, Refills: 2    Associated Diagnoses: Upper respiratory tract infection, unspecified type      glucose blood (FREESTYLE TEST STRIPS) test strip Use to check blood sugar four times a day in case of CGM failure  Qty: 120 strip, Refills: 3    Associated Diagnoses: Type 2 diabetes mellitus with hyperglycemia, with long-term current use of insulin ([facility])      insulin detemir (Levemir FlexTouch) 100 Units/mL injection pen Inject 36 Units under the skin daily at bedtime  Qty: 30 mL, Refills: 1    Associated Diagnoses: Type 2 diabetes mellitus without complication, without long-term current use of insulin ([facility])      Insulin Disposable Pump (Omnipod DASH Pods, Gen 4,) MISC Use 1 Cartridge every third day  Qty: 45 each, Refills: 1    Associated Diagnoses: Type 2 diabetes mellitus with hyperglycemia, with long-term current use of insulin ([facility])      Jardiance 25 MG TABS TAKE 1 TABLET (25 MG TOTAL) BY MOUTH EVERY MORNING  Qty: 90 tablet, Refills: 1    Associated Diagnoses: Type 2 diabetes mellitus with hyperglycemia, with long-term current use of insulin ([facility])      multivitamin (THERAGRAN) TABS Take 1 tablet by mouth daily      NovoLOG 100 UNIT/ML injection INJECT 300 UNITS INTO OMNIPOD EVERY 72 HOURS  Qty: 90 mL, Refills: 1    Associated Diagnoses: Type 2 diabetes mellitus with hyperglycemia, with long-term current use of insulin ([facility])      NovoLOG FlexPen 100 units/mL injection pen INJECT 12-14 UNITS PRIOR TO EACH MEAL 3 TIMES A DAY INCASE OF PUMP FAILURE  Qty: 15 mL, Refills: 1    Associated Diagnoses: Type 2 diabetes mellitus without complication, without long-term current use of insulin ([facility])      omeprazole (PriLOSEC) 40 MG capsule Take 1 capsule (40 mg total) by mouth daily  Qty: 90 capsule, Refills: 3    Associated Diagnoses: Pain of upper abdomen      semaglutide, 1 mg/dose, (Ozempic, 1 MG/DOSE,) 4 mg/3 mL injection pen Inject under the skin 1mg weekly  Qty: 9 mL, Refills: 1    Associated Diagnoses: Type 2 diabetes mellitus without complication, with long-term current use of insulin ([address]);  Morbid obesity ([facility])      phenazopyridine (PYRIDIUM) 100 mg tablet Take 1 tablet (100 mg total) by mouth 3 (three) times a day as needed for bladder spasms  Qty: 10 tablet, Refills: 0    Associated Diagnoses: Urinary tract infection without hematuria, site unspecified      polyethylene glycol (GOLYTELY) 4000 mL solution Take 4,000 mL by mouth once for 1 dose  Qty: 4000 mL, Refills: 0    Associated Diagnoses: Screening for colon cancer              Discharge instructions/Information to patient and family:   See after visit summary for information provided to patient and family. Provisions for Follow-Up Care:  See after visit summary for information related to follow-up care and any pertinent home health orders. Discharge Statement     I spent 35 minutes discharging the patient. This time was spent on the day of discharge. I had direct contact with the patient on the day of discharge. Additional documentation is required if more than 30 minutes were spent on discharge:    I reviewed with Gilda Rees importance of compliance with medications and outpatient treatment after discharge. I discussed the medication regimen and possible side effects of the medications with Gilda Rees prior to discharge. At the time of discharge she was tolerating psychiatric medications. I discussed outpatient follow up with Gilda Rees. I reviewed with Gilda Rees crisis plan and safety plan upon discharge. Outpatient Smoking Cessation referral was offered to Gilda Rees.  She declined the referral.

## 2023-10-11 NOTE — BH TRANSITION RECORD
Contact Information: If you have any questions, concerns, pended studies, tests and/or procedures, or emergencies regarding your inpatient behavioral health visit. Please contact [facility] [phone] and ask to speak to a , nurse or physician. A contact is available 24 hours/ 7 days a week at this number. Summary of Procedures Performed During your Stay:  Below is a list of major procedures performed during your hospital stay and a summary of results:  - No major procedures performed. Pending Studies (From admission, onward)      None          Please follow up on the above pending studies with your PCP and/or referring provider.

## 2023-10-11 NOTE — SOCIAL WORK
LEANNE placed call to pt's psych provider:    [facility] (psych)  [address]  Ph: [phone]    No ans; LEANNE left vm with d/c date and requested callback to schedule pt AKBAR appt. LEANNE placed call to pt's pcp:    [facility] (pcp)  [address] [facility] [address]  [address]  Ph: [phone]    No ans; LEANNE left vm requesting callback if AKBAR appt desired. Informed pt d/c is tomorrow. LEANNE placed call to pt's , Gael Crowell, at  [phone] to discuss pt's d/c.  said pt sounded good.  said he can  pt at 2:00 p.m. Verified [facility].

## 2023-10-11 NOTE — PROGRESS NOTES
Progress Note - Stephania Spaulding 48 y.o. female MRN: [id number]    Unit/Bed#: Nikolay Valencia 201-01 Encounter: [id number]        Subjective:   Patient seen and examined at bedside after reviewing the chart and discussing the case with the caring staff. Patient examined at bedside. Patient has no acute complaints. Physical Exam   Vitals: Blood pressure 122/64, pulse 64, temperature (!) 96.8 °F (36 °C), temperature source Temporal, resp. rate 18, height 5' 6" (1.676 m), weight 135 kg (297 lb 9.6 oz), SpO2 97 %. ,Body mass index is 48.03 kg/m². Constitutional: Patient appears well-developed. HEENT: PERR, EOMI, MMM. Cardiovascular: Normal rate and regular rhythm. Pulmonary/Chest: Effort normal and breath sounds normal.   Abdomen: Soft, + BS, NT. Assessment/Plan:  Stephania Spaulding is a(n) 50y.o. year old female with MDD. 1.  Cardiac with history of HTN, dyslipidemia. Continue atorvastatin 10 mg daily. Lisinopril 2.5 mg discontinued as patient refusing. 2.  Type 2 diabetes mellitus requiring insulin. Patient using insulin pump at home. On unit will use Levemir 36 units at bedtime. Humalog insulin sliding scale coverage 4 times daily with accu-cheks ac/hs. Continue Jardiance 25 mg daily and Ozempic 1 mg subcutaneously weekly. Carb controlled diet. Hgb A1c 6.7% on 10/5/2023. Will reach out to endocrinology if needed for insulin dosing recommendations. 3.  Allergic rhinitis. Patient is on Flonase nasal spray. 4.  GERD. Patient is on Protonix 40 mg daily. 5.  Tobacco abuse. NRT. 6.  Asthma. Stable. Albuterol inhaler as needed. 7. Intertrigo. Start nystatin powder twice daily. 8.  Vitamin D deficiency. Patient started on vitamin D2 50,000 units weekly for 6 weeks followed by vitamin D3 1000 units daily. 9.  Vitamin B12 deficiency. Patient started on monthly vitamin B12 injections. The patient was discussed with Dr. Imelda Solorio and he is in agreement with the above note.

## 2023-10-11 NOTE — PROGRESS NOTES
10/11/23    Team Meeting   Meeting Type Daily Rounds   Team Members Present   Team Members Present Physician;Nurse;   Physician Team Member Dr. Denny Wagner DO; DMITRY Son   Nursing Team Member Andreia Pelayo RN   Care Management Team Member MS Uyen, [facility]   OT Team Member Katharina Brunner, [place]   Patient/Family Present   Patient Present No   Patient's Family Present No   Will d/c to home tomorrow and follow up with [facility]. Social, visible, pleasant. Denies all.

## 2023-10-12 VITALS
SYSTOLIC BLOOD PRESSURE: 115 MMHG | DIASTOLIC BLOOD PRESSURE: 73 MMHG | HEART RATE: 63 BPM | TEMPERATURE: 97.6 F | OXYGEN SATURATION: 97 % | WEIGHT: 293 LBS | BODY MASS INDEX: 47.09 KG/M2 | RESPIRATION RATE: 16 BRPM | HEIGHT: 66 IN

## 2023-10-12 LAB
GLUCOSE SERPL-MCNC: 150 MG/DL (ref 65–140)
GLUCOSE SERPL-MCNC: 99 MG/DL (ref 65–140)

## 2023-10-12 PROCEDURE — 99239 HOSP IP/OBS DSCHRG MGMT >30: CPT

## 2023-10-12 PROCEDURE — 82948 REAGENT STRIP/BLOOD GLUCOSE: CPT

## 2023-10-12 RX ADMIN — VENLAFAXINE HYDROCHLORIDE 150 MG: 150 CAPSULE, EXTENDED RELEASE ORAL at 08:36

## 2023-10-12 RX ADMIN — INSULIN LISPRO 2 UNITS: 100 INJECTION, SOLUTION INTRAVENOUS; SUBCUTANEOUS at 12:29

## 2023-10-12 RX ADMIN — GABAPENTIN 200 MG: 100 CAPSULE ORAL at 08:36

## 2023-10-12 RX ADMIN — PANTOPRAZOLE SODIUM 40 MG: 40 TABLET, DELAYED RELEASE ORAL at 06:25

## 2023-10-12 RX ADMIN — B-COMPLEX W/ C & FOLIC ACID TAB 1 TABLET: TAB at 08:36

## 2023-10-12 RX ADMIN — NYSTATIN 1 APPLICATION: 100000 POWDER TOPICAL at 08:41

## 2023-10-12 RX ADMIN — EMPAGLIFLOZIN 25 MG: 25 TABLET, FILM COATED ORAL at 08:36

## 2023-10-12 RX ADMIN — FLUTICASONE PROPIONATE 1 SPRAY: 50 SPRAY, METERED NASAL at 08:38

## 2023-10-12 NOTE — NURSING NOTE
Patient was visible in milieu, spent most of the shift watching TV with select peers , Patient,calm cooperative and pleasant upon approach, complied with meds and meals,. Staff maintained Q 7 safety checks, administered medication as prescribed and assisted as needed. Will continue to monitor , suppoert and encourage as needed.

## 2023-10-12 NOTE — PROGRESS NOTES
Progress Note - Batsheva Collins 48 y.o. female MRN: [id number]    Unit/Bed#: Ben Holloway 201-01 Encounter: [id number]        Subjective:   Patient seen and examined at bedside after reviewing the chart and discussing the case with the caring staff. Patient examined at bedside. Patient has no acute complaints. Patient is being discharged today, Thursday 10/12/2023. Physical Exam   Vitals: Blood pressure 115/73, pulse 63, temperature 97.6 °F (36.4 °C), temperature source Temporal, resp. rate 16, height 5' 6" (1.676 m), weight 135 kg (297 lb 9.6 oz), SpO2 97 %. ,Body mass index is 48.03 kg/m². Constitutional: Patient appears well-developed. HEENT: PERR, EOMI, MMM. Cardiovascular: Normal rate and regular rhythm. Pulmonary/Chest: Effort normal and breath sounds normal.   Abdomen: Soft, + BS, NT. Assessment/Plan:  Batsheva Collins is a(n) 50y.o. year old female with MDD. Medical clearance. Patient is medically cleared for discharge. All groups were sent out with the patient. 1.  Cardiac with history of HTN, dyslipidemia. Continue atorvastatin 10 mg daily. Lisinopril 2.5 mg discontinued as patient refusing. 2.  Type 2 diabetes mellitus requiring insulin. Patient using insulin pump at home. On unit will use Levemir 36 units at bedtime. Humalog insulin sliding scale coverage 4 times daily with accu-cheks ac/hs. Continue Jardiance 25 mg daily and Ozempic 1 mg subcutaneously weekly. Carb controlled diet. Hgb A1c 6.7% on 10/5/2023. Will reach out to endocrinology if needed for insulin dosing recommendations. 3.  Allergic rhinitis. Patient is on Flonase nasal spray. 4.  GERD. Patient is on Protonix 40 mg daily. 5.  Tobacco abuse. NRT. 6.  Asthma. Stable. Albuterol inhaler as needed. 7. Intertrigo. Start nystatin powder twice daily. 8.  Vitamin D deficiency. Patient started on vitamin D2 50,000 units weekly for 6 weeks followed by vitamin D3 1000 units daily.   9.  Vitamin B12 deficiency. Patient started on monthly vitamin B12 injections. The patient was discussed with Dr. Travis Pedro and he is in agreement with the above note.

## 2023-10-12 NOTE — PROGRESS NOTES
10/12/23   Team Meeting   Meeting Type Daily Rounds   Team Members Present   Team Members Present Physician;Nurse;; Other (Discipline and Name)   Physician Team Member Dr Bejarano Appl; Margaret ANDRES   Nursing Team Member Sunday Jeannie RN   Care Management Team Member Celestino Freeman MS, [facility], [facility]   Social Work Team Member [address]   OT Team Member Pedro Morales   Other (Discipline and Name) Carlos lAas, Pharmacist Clinical Specialist   Patient/Family Present   Patient Present No   Patient's Family Present No     Pt denied all mh symptoms am. Slept last pm. Refused nicotine patch am. D/C today.

## 2023-10-12 NOTE — NURSING NOTE
Pt instructed on discharge instructions & medications; & verbalized understanding of instructions. Pt discharged home via main entrance of hospital via ambulation & belongings sent with pt. Pt transported home via car with her spouse.

## 2023-10-12 NOTE — SOCIAL WORK
LEANNE met with pt in corner of dining room. IMM explained to pt who expressed verbal confirmation of understanding. Pt declined to appeal discharge Pt signed IMM. IMM to be scanned to media.     LEANNE discussed resources and will add [facility] number on pt's AVS.    Warmline  [address] [place] [address]  [phone]    [address] [place] [address] added to AVS.

## 2023-10-12 NOTE — NURSING NOTE
Pt up ad yoav & gait is steady. Pt denies any depression or anxiety. Pt denies any hallucinations, suicidal or homicidal ideations. Trace edema noted BLE. Q 7 min checks maintained to monitor pt's behavior & safety. Pt is pleasant & socializes with other patients. Pt is cooperative & compliant with medications. Pt to be discharged home today with her spouse.

## 2023-10-18 ENCOUNTER — OFFICE VISIT (OUTPATIENT)
Dept: FAMILY MEDICINE CLINIC | Facility: CLINIC | Age: 53
End: 2023-10-18
Payer: COMMERCIAL

## 2023-10-18 VITALS
HEIGHT: 66 IN | DIASTOLIC BLOOD PRESSURE: 76 MMHG | HEART RATE: 82 BPM | RESPIRATION RATE: 18 BRPM | OXYGEN SATURATION: 97 % | TEMPERATURE: 99.2 F | BODY MASS INDEX: 47.09 KG/M2 | WEIGHT: 293 LBS | SYSTOLIC BLOOD PRESSURE: 118 MMHG

## 2023-10-18 DIAGNOSIS — J45.30 MILD PERSISTENT ASTHMA WITHOUT COMPLICATION: Chronic | ICD-10-CM

## 2023-10-18 DIAGNOSIS — E78.00 HYPERCHOLESTEROLEMIA: ICD-10-CM

## 2023-10-18 DIAGNOSIS — H10.33 ACUTE BACTERIAL CONJUNCTIVITIS OF BOTH EYES: Primary | ICD-10-CM

## 2023-10-18 PROCEDURE — 99214 OFFICE O/P EST MOD 30 MIN: CPT | Performed by: FAMILY MEDICINE

## 2023-10-18 RX ORDER — ATORVASTATIN CALCIUM 10 MG/1
10 TABLET, FILM COATED ORAL DAILY
Qty: 30 TABLET | Refills: 0 | Status: SHIPPED | OUTPATIENT
Start: 2023-10-18

## 2023-10-18 RX ORDER — ALBUTEROL SULFATE 90 UG/1
2 AEROSOL, METERED RESPIRATORY (INHALATION) EVERY 6 HOURS PRN
Qty: 18 G | Refills: 0 | Status: SHIPPED | OUTPATIENT
Start: 2023-10-18

## 2023-10-18 RX ORDER — OFLOXACIN 3 MG/ML
1 SOLUTION/ DROPS OPHTHALMIC 4 TIMES DAILY
Qty: 1.4 ML | Refills: 0 | Status: SHIPPED | OUTPATIENT
Start: 2023-10-18 | End: 2023-10-25

## 2023-10-18 NOTE — PROGRESS NOTES
Assessment/Plan:       Problem List Items Addressed This Visit          Respiratory    Mild persistent asthma without complication (Chronic)    Relevant Medications    albuterol (PROVENTIL HFA,VENTOLIN HFA) 90 mcg/act inhaler     Other Visit Diagnoses       Acute bacterial conjunctivitis of both eyes    -  Primary    Hypercholesterolemia        Relevant Medications    atorvastatin (LIPITOR) 10 mg tablet              Recommended antibiotic eye drops, call if symptoms do not improve. Subjective:      Patient ID: Cm Almanzar is a 48 y.o. female. HPI    3 days of pink eye symptoms, itchy, watery, discharge, redness, kids visiting gave it to her. No eye pain or vision changes. Otherwise feeling well. Needs a couple refills. The following portions of the patient's history were reviewed and updated as appropriate: allergies, current medications, past family history, past medical history, past social history, past surgical history, and problem list.    Review of Systems   All other systems reviewed and are negative. Objective:      /76   Pulse 82   Temp 99.2 °F (37.3 °C) (Tympanic)   Resp 18   Ht 5' 6" (1.676 m)   Wt (!) 137 kg (302 lb 6.4 oz)   LMP  (LMP Unknown) Comment: Period over 5 yeaers ago due to implant  SpO2 97%   BMI 48.81 kg/m²          Physical Exam  Vitals reviewed. Constitutional:       General: She is not in acute distress. Appearance: Normal appearance. She is not ill-appearing, toxic-appearing or diaphoretic. Eyes:      General: No scleral icterus. Right eye: No discharge. Left eye: No discharge. Extraocular Movements: Extraocular movements intact. Pupils: Pupils are equal, round, and reactive to light. Comments: Conjunctiva slightly erythematous bilaterally   Pulmonary:      Effort: Pulmonary effort is normal. No respiratory distress. Neurological:      Mental Status: She is alert and oriented to person, place, and time. Florentino Rainey, DO  [address] [facility]

## 2023-11-02 ENCOUNTER — TELEPHONE (OUTPATIENT)
Dept: ENDOCRINOLOGY | Facility: CLINIC | Age: 53
End: 2023-11-02

## 2023-11-02 DIAGNOSIS — E11.65 TYPE 2 DIABETES MELLITUS WITH HYPERGLYCEMIA, WITH LONG-TERM CURRENT USE OF INSULIN (HCC): Primary | ICD-10-CM

## 2023-11-02 DIAGNOSIS — Z79.4 TYPE 2 DIABETES MELLITUS WITH HYPERGLYCEMIA, WITH LONG-TERM CURRENT USE OF INSULIN (HCC): Primary | ICD-10-CM

## 2023-11-02 RX ORDER — INSULIN PMP CART,AUT,G6/7,CNTR
EACH SUBCUTANEOUS
Qty: 10 EACH | Refills: 0 | Status: SHIPPED | OUTPATIENT
Start: 2023-11-02

## 2023-11-02 RX ORDER — INSULIN PMP CART,AUT,G6/7,CNTR
EACH SUBCUTANEOUS
Qty: 1 KIT | Refills: 0 | Status: SHIPPED | OUTPATIENT
Start: 2023-11-02

## 2023-11-02 NOTE — TELEPHONE ENCOUNTER
Patient meant that she can no longer get her omnipod dash pumps as they are being discontinued at the end of the year. She needed a new omnipod 5 kit and pods sent to her local pharmacy which was completed. Also emailed Ange Been our omnipod rep, to see if she could aid patient if she was to run into any trouble.

## 2023-11-02 NOTE — TELEPHONE ENCOUNTER
Patient needs a new script for a new omnipod because the omnipod dash's pods are going to be going out of stock shortly.  Patient doesn't know where she got them prior or knows what the new omnipod is called

## 2023-11-21 ENCOUNTER — TELEPHONE (OUTPATIENT)
Dept: FAMILY MEDICINE CLINIC | Facility: CLINIC | Age: 53
End: 2023-11-21

## 2023-11-21 NOTE — TELEPHONE ENCOUNTER
She was exposed to covid   Started yesterday with fever chills wants to know if she can come for a test

## 2023-12-04 DIAGNOSIS — E11.9 TYPE 2 DIABETES MELLITUS WITHOUT COMPLICATION, WITH LONG-TERM CURRENT USE OF INSULIN (HCC): ICD-10-CM

## 2023-12-04 DIAGNOSIS — Z79.4 TYPE 2 DIABETES MELLITUS WITHOUT COMPLICATION, WITH LONG-TERM CURRENT USE OF INSULIN (HCC): ICD-10-CM

## 2023-12-04 DIAGNOSIS — E66.01 MORBID OBESITY (HCC): ICD-10-CM

## 2023-12-04 RX ORDER — SEMAGLUTIDE 1.34 MG/ML
INJECTION, SOLUTION SUBCUTANEOUS
Qty: 3 ML | Refills: 1 | Status: SHIPPED | OUTPATIENT
Start: 2023-12-04

## 2023-12-12 ENCOUNTER — OFFICE VISIT (OUTPATIENT)
Dept: URGENT CARE | Facility: CLINIC | Age: 53
End: 2023-12-12
Payer: COMMERCIAL

## 2023-12-12 VITALS
BODY MASS INDEX: 47.09 KG/M2 | SYSTOLIC BLOOD PRESSURE: 129 MMHG | HEART RATE: 72 BPM | WEIGHT: 293 LBS | DIASTOLIC BLOOD PRESSURE: 74 MMHG | RESPIRATION RATE: 16 BRPM | HEIGHT: 66 IN | OXYGEN SATURATION: 100 % | TEMPERATURE: 97.9 F

## 2023-12-12 DIAGNOSIS — H92.02 OTALGIA OF LEFT EAR: Primary | ICD-10-CM

## 2023-12-12 PROCEDURE — S9088 SERVICES PROVIDED IN URGENT: HCPCS | Performed by: NURSE PRACTITIONER

## 2023-12-12 PROCEDURE — 99213 OFFICE O/P EST LOW 20 MIN: CPT | Performed by: NURSE PRACTITIONER
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
## 2023-12-12 NOTE — PATIENT INSTRUCTIONS
Recommend flonase and zyrtec    Rest and drink plenty of fluids. A cool mist humidifier and saline rinse can be helpful. If you develop a worsening cough, chest pain, shortness of breath, palpitations, coughing up blood, prolonged high fever, severe headache, dizziness, any new or concerning symptoms please return or proceed ER.   Recommend following up with PCP in 3-5 days

## 2023-12-14 DIAGNOSIS — E11.9 TYPE 2 DIABETES MELLITUS WITHOUT COMPLICATION, WITHOUT LONG-TERM CURRENT USE OF INSULIN (HCC): ICD-10-CM

## 2023-12-14 RX ORDER — INSULIN ASPART 100 [IU]/ML
INJECTION, SOLUTION INTRAVENOUS; SUBCUTANEOUS
Qty: 15 ML | Refills: 1 | Status: SHIPPED | OUTPATIENT
Start: 2023-12-14

## 2023-12-19 DIAGNOSIS — F41.1 GENERALIZED ANXIETY DISORDER: ICD-10-CM

## 2023-12-19 RX ORDER — HYDROXYZINE 50 MG/1
100 TABLET, FILM COATED ORAL
Qty: 60 TABLET | Refills: 3 | Status: SHIPPED | OUTPATIENT
Start: 2023-12-19

## 2023-12-19 NOTE — TELEPHONE ENCOUNTER
Patient requesting refill(s) of: hydroxyzine     Last filled: 10/11/23  Last appt: 10/18/23  Next appt: none  Pharmacy: [facility]

## 2023-12-28 DIAGNOSIS — E78.00 HYPERCHOLESTEROLEMIA: ICD-10-CM

## 2024-01-03 RX ORDER — ATORVASTATIN CALCIUM 10 MG/1
10 TABLET, FILM COATED ORAL DAILY
Qty: 90 TABLET | Refills: 0 | Status: SHIPPED | OUTPATIENT
Start: 2024-01-03

## 2024-01-08 ENCOUNTER — TELEPHONE (OUTPATIENT)
Dept: ENDOCRINOLOGY | Facility: CLINIC | Age: 54
End: 2024-01-08

## 2024-01-08 ENCOUNTER — OFFICE VISIT (OUTPATIENT)
Dept: URGENT CARE | Facility: CLINIC | Age: 54
End: 2024-01-08
Payer: COMMERCIAL

## 2024-01-08 VITALS
SYSTOLIC BLOOD PRESSURE: 140 MMHG | BODY MASS INDEX: 47.09 KG/M2 | WEIGHT: 293 LBS | TEMPERATURE: 97.1 F | OXYGEN SATURATION: 99 % | RESPIRATION RATE: 18 BRPM | DIASTOLIC BLOOD PRESSURE: 86 MMHG | HEART RATE: 70 BPM | HEIGHT: 66 IN

## 2024-01-08 DIAGNOSIS — B96.89 ACUTE BACTERIAL BRONCHITIS: Primary | ICD-10-CM

## 2024-01-08 DIAGNOSIS — R05.1 ACUTE COUGH: ICD-10-CM

## 2024-01-08 DIAGNOSIS — J20.8 ACUTE BACTERIAL BRONCHITIS: Primary | ICD-10-CM

## 2024-01-08 LAB
SARS-COV-2 AG UPPER RESP QL IA: NEGATIVE
VALID CONTROL: NORMAL

## 2024-01-08 PROCEDURE — S9088 SERVICES PROVIDED IN URGENT: HCPCS | Performed by: PHYSICIAN ASSISTANT

## 2024-01-08 PROCEDURE — 99213 OFFICE O/P EST LOW 20 MIN: CPT | Performed by: PHYSICIAN ASSISTANT

## 2024-01-08 PROCEDURE — 87811 SARS-COV-2 COVID19 W/OPTIC: CPT | Performed by: PHYSICIAN ASSISTANT

## 2024-01-08 RX ORDER — AZITHROMYCIN 250 MG/1
TABLET, FILM COATED ORAL
Qty: 6 TABLET | Refills: 0 | Status: SHIPPED | OUTPATIENT
Start: 2024-01-08 | End: 2024-01-12
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
## 2024-01-10 ENCOUNTER — TELEPHONE (OUTPATIENT)
Dept: GASTROENTEROLOGY | Facility: CLINIC | Age: 54
End: 2024-01-10

## 2024-01-10 NOTE — TELEPHONE ENCOUNTER
Patient is currently on Freestyle jaenie 2 but was supposed to be getting an upgrade to Dexcom G7. Patient states that Kary Mast is looking for more information from our office. Please advise. Patient asking for update when available CB# [phone]

## 2024-01-13 DIAGNOSIS — Z79.4 TYPE 2 DIABETES MELLITUS WITHOUT COMPLICATION, WITH LONG-TERM CURRENT USE OF INSULIN (HCC): ICD-10-CM

## 2024-01-13 DIAGNOSIS — E11.9 TYPE 2 DIABETES MELLITUS WITHOUT COMPLICATION, WITHOUT LONG-TERM CURRENT USE OF INSULIN (HCC): ICD-10-CM

## 2024-01-13 DIAGNOSIS — E11.9 TYPE 2 DIABETES MELLITUS WITHOUT COMPLICATION, WITH LONG-TERM CURRENT USE OF INSULIN (HCC): ICD-10-CM

## 2024-01-15 ENCOUNTER — RA CDI HCC (OUTPATIENT)
Dept: OTHER | Facility: HOSPITAL | Age: 54
End: 2024-01-15

## 2024-01-15 RX ORDER — FLURBIPROFEN SODIUM 0.3 MG/ML
SOLUTION/ DROPS OPHTHALMIC
Qty: 300 EACH | Refills: 1 | Status: SHIPPED | OUTPATIENT
Start: 2024-01-15

## 2024-01-17 ENCOUNTER — TELEPHONE (OUTPATIENT)
Dept: ENDOCRINOLOGY | Facility: CLINIC | Age: 54
End: 2024-01-17

## 2024-01-19 ENCOUNTER — TELEPHONE (OUTPATIENT)
Dept: DIABETES SERVICES | Facility: CLINIC | Age: 54
End: 2024-01-19

## 2024-01-19 DIAGNOSIS — Z79.4 TYPE 2 DIABETES MELLITUS WITH HYPERGLYCEMIA, WITH LONG-TERM CURRENT USE OF INSULIN (HCC): Primary | ICD-10-CM

## 2024-01-19 DIAGNOSIS — E11.65 TYPE 2 DIABETES MELLITUS WITH HYPERGLYCEMIA, WITH LONG-TERM CURRENT USE OF INSULIN (HCC): Primary | ICD-10-CM

## 2024-01-23 ENCOUNTER — TELEPHONE (OUTPATIENT)
Dept: GASTROENTEROLOGY | Facility: CLINIC | Age: 54
End: 2024-01-23

## 2024-01-23 ENCOUNTER — PREP FOR PROCEDURE (OUTPATIENT)
Dept: GASTROENTEROLOGY | Facility: CLINIC | Age: 54
End: 2024-01-23

## 2024-01-23 ENCOUNTER — RA CDI HCC (OUTPATIENT)
Dept: OTHER | Facility: HOSPITAL | Age: 54
End: 2024-01-23

## 2024-01-23 ENCOUNTER — OFFICE VISIT (OUTPATIENT)
Dept: FAMILY MEDICINE CLINIC | Facility: CLINIC | Age: 54
End: 2024-01-23
Payer: COMMERCIAL

## 2024-01-23 VITALS
OXYGEN SATURATION: 98 % | BODY MASS INDEX: 47.09 KG/M2 | TEMPERATURE: 98.3 F | HEIGHT: 66 IN | DIASTOLIC BLOOD PRESSURE: 84 MMHG | SYSTOLIC BLOOD PRESSURE: 130 MMHG | RESPIRATION RATE: 18 BRPM | WEIGHT: 293 LBS | HEART RATE: 71 BPM

## 2024-01-23 DIAGNOSIS — E78.00 HYPERCHOLESTEROLEMIA: ICD-10-CM

## 2024-01-23 DIAGNOSIS — K21.9 GASTROESOPHAGEAL REFLUX DISEASE WITHOUT ESOPHAGITIS: Chronic | ICD-10-CM

## 2024-01-23 DIAGNOSIS — Z00.00 MEDICARE ANNUAL WELLNESS VISIT, SUBSEQUENT: Primary | ICD-10-CM

## 2024-01-23 DIAGNOSIS — J02.9 PHARYNGITIS, UNSPECIFIED ETIOLOGY: ICD-10-CM

## 2024-01-23 DIAGNOSIS — C69.42 MALIGNANT MELANOMA OF UVEA OF LEFT EYE (HCC): ICD-10-CM

## 2024-01-23 DIAGNOSIS — E11.65 TYPE 2 DIABETES MELLITUS WITH HYPERGLYCEMIA, WITH LONG-TERM CURRENT USE OF INSULIN (HCC): ICD-10-CM

## 2024-01-23 DIAGNOSIS — Z12.31 BREAST CANCER SCREENING BY MAMMOGRAM: ICD-10-CM

## 2024-01-23 DIAGNOSIS — F17.200 SMOKER: ICD-10-CM

## 2024-01-23 DIAGNOSIS — Z12.11 SCREENING FOR COLON CANCER: Primary | ICD-10-CM

## 2024-01-23 DIAGNOSIS — E53.8 VITAMIN B12 DEFICIENCY: ICD-10-CM

## 2024-01-23 DIAGNOSIS — E66.01 MORBID OBESITY (HCC): ICD-10-CM

## 2024-01-23 DIAGNOSIS — I10 ESSENTIAL HYPERTENSION: ICD-10-CM

## 2024-01-23 DIAGNOSIS — Z79.4 TYPE 2 DIABETES MELLITUS WITH HYPERGLYCEMIA, WITH LONG-TERM CURRENT USE OF INSULIN (HCC): ICD-10-CM

## 2024-01-23 DIAGNOSIS — E11.9 ENCOUNTER FOR DIABETIC FOOT EXAM (HCC): ICD-10-CM

## 2024-01-23 DIAGNOSIS — Z12.11 SCREEN FOR COLON CANCER: ICD-10-CM

## 2024-01-23 LAB — S PYO DNA THROAT QL NAA+PROBE: NOT DETECTED

## 2024-01-23 PROCEDURE — 99386 PREV VISIT NEW AGE 40-64: CPT | Performed by: NURSE PRACTITIONER

## 2024-01-23 PROCEDURE — 87651 STREP A DNA AMP PROBE: CPT | Performed by: NURSE PRACTITIONER

## 2024-01-23 PROCEDURE — 99214 OFFICE O/P EST MOD 30 MIN: CPT | Performed by: NURSE PRACTITIONER

## 2024-01-23 RX ORDER — PREDNISONE 10 MG/1
TABLET ORAL DAILY
Qty: 9 TABLET | Refills: 0 | Status: SHIPPED | OUTPATIENT
Start: 2024-01-23 | End: 2024-01-29

## 2024-01-23 NOTE — TELEPHONE ENCOUNTER
Referring Provider DMITRY Orozco     Pre- Screening:     There is no height or weight on file to calculate BMI.  Has patient been referred for a routine screening Colonoscopy? yes  Is the patient between 45-75 years old? yes       Previous Colonoscopy no       Does the patient want to see a Gastroenterologist prior to their procedure OR are they having any GI symptoms? no     Has the patient been hospitalized or had abdominal surgery in the past 6 months? no     Does the patient use supplemental oxygen? no     Does the patient take Coumadin, Lovenox, Plavix, Elliquis, Xarelto, or other blood thinning medication? no     Has the patient had a stroke, cardiac event, or stent placed in the past year? no     Patient scheduled for OA colonoscopy on 3/4 at [facility]. She is diabetic, diabetic information sheet provided to patient along with prep instructions. Advised to call with questions or concerns.     [facility] sent to [facility]

## 2024-01-23 NOTE — PATIENT INSTRUCTIONS
Medicare Preventive Visit Patient Instructions  Thank you for completing your Welcome to Medicare Visit or Medicare Annual Wellness Visit today. Your next wellness visit will be due in one year (1/23/2025).  The screening/preventive services that you may require over the next 5-10 years are detailed below. Some tests may not apply to you based off risk factors and/or age. Screening tests ordered at today's visit but not completed yet may show as past due. Also, please note that scanned in results may not display below.  Preventive Screenings:  Service Recommendations Previous Testing/Comments   Colorectal Cancer Screening  * Colonoscopy    * Fecal Occult Blood Test (FOBT)/Fecal Immunochemical Test (FIT)  * Fecal DNA/Cologuard Test  * Flexible Sigmoidoscopy Age: 45-75 years old   Colonoscopy: every 10 years (may be performed more frequently if at higher risk)  OR  FOBT/FIT: every 1 year  OR  Cologuard: every 3 years  OR  Sigmoidoscopy: every 5 years  Screening may be recommended earlier than age 45 if at higher risk for colorectal cancer. Also, an individualized decision between you and your healthcare provider will decide whether screening between the ages of 76-85 would be appropriate. Colonoscopy: Not on file  FOBT/FIT: Not on file  Cologuard: Not on file  Sigmoidoscopy: Not on file          Breast Cancer Screening Age: 40+ years old  Frequency: every 1-2 years  Not required if history of left and right mastectomy Mammogram: 12/07/2022    Screening Current   Cervical Cancer Screening Between the ages of 21-29, pap smear recommended once every 3 years.   Between the ages of 30-65, can perform pap smear with HPV co-testing every 5 years.   Recommendations may differ for women with a history of total hysterectomy, cervical cancer, or abnormal pap smears in past. Pap Smear: 05/10/2022    Screening Current   Hepatitis C Screening Once for adults born between 1945 and 1965  More frequently in patients at high risk for  Hepatitis C Hep C Antibody: Not on file        Diabetes Screening 1-2 times per year if you're at risk for diabetes or have pre-diabetes Fasting glucose: 114 mg/dL (6/20/2023)  A1C: 6.7 % (10/5/2023)  Screening Not Indicated  History Diabetes   Cholesterol Screening Once every 5 years if you don't have a lipid disorder. May order more often based on risk factors. Lipid panel: 09/01/2022    Screening Not Indicated  History Lipid Disorder     Other Preventive Screenings Covered by Medicare:  Abdominal Aortic Aneurysm (AAA) Screening: covered once if your at risk. You're considered to be at risk if you have a family history of AAA.  Lung Cancer Screening: covers low dose CT scan once per year if you meet all of the following conditions: (1) Age 55-77; (2) No signs or symptoms of lung cancer; (3) Current smoker or have quit smoking within the last 15 years; (4) You have a tobacco smoking history of at least 20 pack years (packs per day multiplied by number of years you smoked); (5) You get a written order from a healthcare provider.  Glaucoma Screening: covered annually if you're considered high risk: (1) You have diabetes OR (2) Family history of glaucoma OR (3)  aged 50 and older OR (4)  American aged 65 and older  Osteoporosis Screening: covered every 2 years if you meet one of the following conditions: (1) You're estrogen deficient and at risk for osteoporosis based off medical history and other findings; (2) Have a vertebral abnormality; (3) On glucocorticoid therapy for more than 3 months; (4) Have primary hyperparathyroidism; (5) On osteoporosis medications and need to assess response to drug therapy.   Last bone density test (DXA Scan): Not on file.  HIV Screening: covered annually if you're between the age of 15-65. Also covered annually if you are younger than 15 and older than 65 with risk factors for HIV infection. For pregnant patients, it is covered up to 3 times per  pregnancy.    Immunizations:  Immunization Recommendations   Influenza Vaccine Annual influenza vaccination during flu season is recommended for all persons aged >= 6 months who do not have contraindications   Pneumococcal Vaccine   * Pneumococcal conjugate vaccine = PCV13 (Prevnar 13), PCV15 (Vaxneuvance), PCV20 (Prevnar 20)  * Pneumococcal polysaccharide vaccine = PPSV23 (Pneumovax) Adults 19-65 yo with certain risk factors or if 65+ yo  If never received any pneumonia vaccine: recommend Prevnar 20 (PCV20)  Give PCV20 if previously received 1 dose of PCV13 or PPSV23   Hepatitis B Vaccine 3 dose series if at intermediate or high risk (ex: diabetes, end stage renal disease, liver disease)   Respiratory syncytial virus (RSV) Vaccine - COVERED BY MEDICARE PART D  * RSVPreF3 (Arexvy) CDC recommends that adults 60 years of age and older may receive a single dose of RSV vaccine using shared clinical decision-making (SCDM)   Tetanus (Td) Vaccine - COST NOT COVERED BY MEDICARE PART B Following completion of primary series, a booster dose should be given every 10 years to maintain immunity against tetanus. Td may also be given as tetanus wound prophylaxis.   Tdap Vaccine - COST NOT COVERED BY MEDICARE PART B Recommended at least once for all adults. For pregnant patients, recommended with each pregnancy.   Shingles Vaccine (Shingrix) - COST NOT COVERED BY MEDICARE PART B  2 shot series recommended in those 19 years and older who have or will have weakened immune systems or those 50 years and older     Health Maintenance Due:      Topic Date Due   • Hepatitis C Screening  Never done   • HIV Screening  Never done   • Colorectal Cancer Screening  Never done   • Breast Cancer Screening: Mammogram  12/07/2023   • Cervical Cancer Screening  05/10/2027     Immunizations Due:      Topic Date Due   • Pneumococcal Vaccine: Pediatrics (0 to 5 Years) and At-Risk Patients (6 to 64 Years) (2 - PCV) 11/16/2022   • COVID-19 Vaccine (3 -  2023-24 season) 09/01/2023     Advance Directives   What are advance directives?  Advance directives are legal documents that state your wishes and plans for medical care. These plans are made ahead of time in case you lose your ability to make decisions for yourself. Advance directives can apply to any medical decision, such as the treatments you want, and if you want to donate organs.   What are the types of advance directives?  There are many types of advance directives, and each state has rules about how to use them. You may choose a combination of any of the following:  Living will:  This is a written record of the treatment you want. You can also choose which treatments you do not want, which to limit, and which to stop at a certain time. This includes surgery, medicine, IV fluid, and tube feedings.   Durable power of  for healthcare (DPAHC):  This is a written record that states who you want to make healthcare choices for you when you are unable to make them for yourself. This person, called a proxy, is usually a family member or a friend. You may choose more than 1 proxy.  Do not resuscitate (DNR) order:  A DNR order is used in case your heart stops beating or you stop breathing. It is a request not to have certain forms of treatment, such as CPR. A DNR order may be included in other types of advance directives.  Medical directive:  This covers the care that you want if you are in a coma, near death, or unable to make decisions for yourself. You can list the treatments you want for each condition. Treatment may include pain medicine, surgery, blood transfusions, dialysis, IV or tube feedings, and a ventilator (breathing machine).  Values history:  This document has questions about your views, beliefs, and how you feel and think about life. This information can help others choose the care that you would choose.  Why are advance directives important?  An advance directive helps you control your care.  Although spoken wishes may be used, it is better to have your wishes written down. Spoken wishes can be misunderstood, or not followed. Treatments may be given even if you do not want them. An advance directive may make it easier for your family to make difficult choices about your care.   Cigarette Smoking and Your Health   Risks to your health if you smoke:  Nicotine and other chemicals found in tobacco damage every cell in your body. Even if you are a light smoker, you have an increased risk for cancer, heart disease, and lung disease. If you are pregnant or have diabetes, smoking increases your risk for complications.   Benefits to your health if you stop smoking:   You decrease respiratory symptoms such as coughing, wheezing, and shortness of breath.   You reduce your risk for cancers of the lung, mouth, throat, kidney, bladder, pancreas, stomach, and cervix. If you already have cancer, you increase the benefits of chemotherapy. You also reduce your risk for cancer returning or a second cancer from developing.   You reduce your risk for heart disease, blood clots, heart attack, and stroke.   You reduce your risk for lung infections, and diseases such as pneumonia, asthma, chronic bronchitis, and emphysema.  Your circulation improves. More oxygen can be delivered to your body. If you have diabetes, you lower your risk for complications, such as kidney, artery, and eye diseases. You also lower your risk for nerve damage. Nerve damage can lead to amputations, poor vision, and blindness.  You improve your body's ability to heal and to fight infections.  For more information and support to stop smoking:   [employer]  Phone: 8- 743 - 968-7262  Web Address: www.[employer]  Weight Management   Why it is important to manage your weight:  Being overweight increases your risk of health conditions such as heart disease, high blood pressure, type 2 diabetes, and certain types of cancer. It can also increase your risk  for osteoarthritis, sleep apnea, and other respiratory problems. Aim for a slow, steady weight loss. Even a small amount of weight loss can lower your risk of health problems.  How to lose weight safely:  A safe and healthy way to lose weight is to eat fewer calories and get regular exercise. You can lose up about 1 pound a week by decreasing the number of calories you eat by 500 calories each day.   Healthy meal plan for weight management:  A healthy meal plan includes a variety of foods, contains fewer calories, and helps you stay healthy. A healthy meal plan includes the following:  Eat whole-grain foods more often.  A healthy meal plan should contain fiber. Fiber is the part of grains, fruits, and vegetables that is not broken down by your body. Whole-grain foods are healthy and provide extra fiber in your diet. Some examples of whole-grain foods are whole-wheat breads and pastas, oatmeal, brown rice, and bulgur.  Eat a variety of vegetables every day.  Include dark, leafy greens such as spinach, kale, xochitl greens, and mustard greens. Eat yellow and orange vegetables such as carrots, sweet potatoes, and winter squash.   Eat a variety of fruits every day.  Choose fresh or canned fruit (canned in its own juice or light syrup) instead of juice. Fruit juice has very little or no fiber.  Eat low-fat dairy foods.  Drink fat-free (skim) milk or 1% milk. Eat fat-free yogurt and low-fat cottage cheese. Try low-fat cheeses such as mozzarella and other reduced-fat cheeses.  Choose meat and other protein foods that are low in fat.  Choose beans or other legumes such as split peas or lentils. Choose fish, skinless poultry (chicken or turkey), or lean cuts of red meat (beef or pork). Before you cook meat or poultry, cut off any visible fat.   Use less fat and oil.  Try baking foods instead of frying them. Add less fat, such as margarine, sour cream, regular salad dressing and mayonnaise to foods. Eat fewer high-fat foods.  Some examples of high-fat foods include french fries, doughnuts, ice cream, and cakes.  Eat fewer sweets.  Limit foods and drinks that are high in sugar. This includes candy, cookies, regular soda, and sweetened drinks.  Exercise:  Exercise at least 30 minutes per day on most days of the week. Some examples of exercise include walking, biking, dancing, and swimming. You can also fit in more physical activity by taking the stairs instead of the elevator or parking farther away from stores. Ask your healthcare provider about the best exercise plan for you.      © Copyright [employer] 2018 Information is for End User's use only and may not be sold, redistributed or otherwise used for commercial purposes. All illustrations and images included in CareNotes® are the copyrighted property of [employer] or [employer]
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
## 2024-01-24 ENCOUNTER — TELEPHONE (OUTPATIENT)
Dept: ADMINISTRATIVE | Facility: OTHER | Age: 54
End: 2024-01-24

## 2024-01-24 NOTE — LETTER
Diabetic Eye Exam Form    Date Requested: 24  Patient: Layla Desai  Patient : 1970   Referring Provider: DMITRY Hammond      DIABETIC Eye Exam Date _______________________________      Type of Exam MUST be documented for Diabetic Eye Exams. Please CHECK ONE.     Retinal Exam       Dilated Retinal Exam       OCT       Optomap-Iris Exam      Fundus Photography       Left Eye - Please check Retinopathy or No Retinopathy        Exam did show retinopathy    Exam did not show retinopathy       Right Eye - Please check Retinopathy or No Retinopathy       Exam did show retinopathy    Exam did not show retinopathy       Comments __________________________________________________________    Practice Providing Exam ______________________________________________    Exam Performed By (print name) _______________________________________      Provider Signature ___________________________________________________      These reports are needed for  compliance.  Please fax this completed form and a copy of the Diabetic Eye Exam report to our office located at [address] [place] [address] as soon as possible via Fax [phone] attention Roseline: Phone [phone]  We thank you for your assistance in treating our mutual patient.

## 2024-01-24 NOTE — TELEPHONE ENCOUNTER
----- Message from Terri Canonn sent at 1/23/2024  1:54 PM EST -----  Regarding: DM EYE  01/23/24 1:54 PM    Hello, our patient Layla Desai has had Diabetic Eye Exam completed/performed. Please assist in updating the patient chart by making an External outreach to [facility] facility located in [place]. The date of service is in the last 2 years.    Thank you,  Terri Cannon  [facility]

## 2024-01-24 NOTE — LETTER
Diabetic Eye Exam Form    Date Requested: 24  Patient: Layla Desai  Patient : 1970   Referring Provider: DMITRY Hammond      DIABETIC Eye Exam Date _______________________________      Type of Exam MUST be documented for Diabetic Eye Exams. Please CHECK ONE.     Retinal Exam       Dilated Retinal Exam       OCT       Optomap-Iris Exam      Fundus Photography       Left Eye - Please check Retinopathy or No Retinopathy        Exam did show retinopathy    Exam did not show retinopathy       Right Eye - Please check Retinopathy or No Retinopathy       Exam did show retinopathy    Exam did not show retinopathy       Comments __________________________________________________________    Practice Providing Exam ______________________________________________    Exam Performed By (print name) _______________________________________      Provider Signature ___________________________________________________      These reports are needed for  compliance.  Please fax this completed form and a copy of the Diabetic Eye Exam report to our office located at [address] [place] [address] as soon as possible via Fax [phone] attention Roseline: Phone [phone]  We thank you for your assistance in treating our mutual patient.

## 2024-01-26 DIAGNOSIS — Z79.4 TYPE 2 DIABETES MELLITUS WITH HYPERGLYCEMIA, WITH LONG-TERM CURRENT USE OF INSULIN (HCC): ICD-10-CM

## 2024-01-26 DIAGNOSIS — E11.65 TYPE 2 DIABETES MELLITUS WITH HYPERGLYCEMIA, WITH LONG-TERM CURRENT USE OF INSULIN (HCC): ICD-10-CM

## 2024-01-26 RX ORDER — PROCHLORPERAZINE 25 MG/1
SUPPOSITORY RECTAL
Qty: 3 EACH | Refills: 0 | Status: SHIPPED | OUTPATIENT
Start: 2024-01-26

## 2024-01-26 RX ORDER — INSULIN PMP CART,AUT,G6/7,CNTR
EACH SUBCUTANEOUS
Qty: 1 KIT | Refills: 0 | Status: SHIPPED | OUTPATIENT
Start: 2024-01-26

## 2024-01-26 RX ORDER — PROCHLORPERAZINE 25 MG/1
SUPPOSITORY RECTAL
Qty: 1 EACH | Refills: 3 | Status: SHIPPED | OUTPATIENT
Start: 2024-01-26

## 2024-01-26 RX ORDER — INSULIN PMP CART,AUT,G6/7,CNTR
EACH SUBCUTANEOUS
Qty: 2 EACH | Refills: 0 | Status: SHIPPED | OUTPATIENT
Start: 2024-01-26

## 2024-01-26 NOTE — TELEPHONE ENCOUNTER
Upon review of the In Basket request and the patient's chart, initial outreach has been made via fax to facility. Please see Contacts section for details.     Thank you  Roseline Thorpe

## 2024-01-26 NOTE — TELEPHONE ENCOUNTER
Omnipod 5 Intro Kit (Gen 5)- Qty 1, Refill 0 NDC #: [address]-60  Omnipod 5 Pods (Gen 5) Refill - 30d supply: Qty 2 boxes, Refill 11 NDC #: [address]-51                                                             90d supply: Qty 6 boxes, Refill 4 NDC #: [address]-34    Dexcom g6 sensor and transmitter also ordered

## 2024-01-30 ENCOUNTER — OFFICE VISIT (OUTPATIENT)
Dept: DIABETES SERVICES | Facility: CLINIC | Age: 54
End: 2024-01-30

## 2024-01-30 DIAGNOSIS — Z79.4 TYPE 2 DIABETES MELLITUS WITHOUT COMPLICATION, WITH LONG-TERM CURRENT USE OF INSULIN (HCC): ICD-10-CM

## 2024-01-30 DIAGNOSIS — E66.01 MORBID OBESITY (HCC): ICD-10-CM

## 2024-01-30 DIAGNOSIS — E11.65 TYPE 2 DIABETES MELLITUS WITH HYPERGLYCEMIA, WITH LONG-TERM CURRENT USE OF INSULIN (HCC): ICD-10-CM

## 2024-01-30 DIAGNOSIS — Z79.4 TYPE 2 DIABETES MELLITUS WITH HYPERGLYCEMIA, WITH LONG-TERM CURRENT USE OF INSULIN (HCC): ICD-10-CM

## 2024-01-30 DIAGNOSIS — E11.9 TYPE 2 DIABETES MELLITUS WITHOUT COMPLICATION, WITH LONG-TERM CURRENT USE OF INSULIN (HCC): ICD-10-CM

## 2024-01-30 PROCEDURE — PBNCHG PB NO CHARGE PLACEHOLDER

## 2024-01-30 RX ORDER — SEMAGLUTIDE 1.34 MG/ML
INJECTION, SOLUTION SUBCUTANEOUS
Qty: 3 ML | Refills: 1 | Status: SHIPPED | OUTPATIENT
Start: 2024-01-30

## 2024-01-30 RX ORDER — EMPAGLIFLOZIN 25 MG/1
25 TABLET, FILM COATED ORAL EVERY MORNING
Qty: 90 TABLET | Refills: 1 | Status: SHIPPED | OUTPATIENT
Start: 2024-01-30

## 2024-01-30 NOTE — PROGRESS NOTES
Omnipod 5 Pump Start     Today Layal Desai presented for her Omnipod 5 insulin pump start. She has all the necessary supplies with her     This is Layla 's first insulin pump:  No, she is upgrading from the DASH     Layla  is currently wearing Dexcom and we will be pairing it with the pump.      Pump settings were provided by: ***        Time     Basal        Correction   Carb Ratio   BG target    Correct Above  *** ***units/hr 1:*** 1:*** *** ***                                                                                     Training completed per training checklist scanned to chart. Settings: as above and order document scanned into the chart . Training completed on filling the pod, Pod insertion, how to take boluses, advanced features, alerts and alarms. Set up patient's Controller in office today.  Answered all of their questions. Will call with questions or for more education.    After instruction, ***  filled and activated pod. They tolerated pod placement on *** well. {CGM List:01091} was already on ***.  Understands importance of line of sight.    Automation {WAS/WAS NOT:25363} turned on today.      See Insulin Pump Training Checklist for additional documentation of topics taught.     Time spent: *** minutes    Patient response to instruction    Comprehension{PATIENT'S RESPONSE COMPREHENSION:2016077577}  Motivation{PATIENT'S RESPONSE MOTIVATION:1636305397}  Expected Compliance{PATIENT'S RESPONSE EXPECTED COMPLIANCE:8566055661}    Thank you for referring your patient to [facility], it was a pleasure working with them today. Please feel free to call with any questions or concerns.    Yany Durbin, RD  [address] [facility]  [facility] [place] [id number] [phone]

## 2024-01-31 ENCOUNTER — TELEPHONE (OUTPATIENT)
Dept: DIABETES SERVICES | Facility: CLINIC | Age: 54
End: 2024-01-31

## 2024-01-31 NOTE — TELEPHONE ENCOUNTER
Pt came in for Omnipod upgrade on 1/30 (Dash to 5). Pt did not set up her Omnipod account beforehand, as she was instructed to. She also did not bring any pods with her. Yany had explained that we would have to reschedule. I offered patient 2/20. She said she needed it sooner as she would not have enough Dash pods to last until 2/20. I told her I would reach out to the Omnipod rep.     Per Omnipod rep, she can meet with patient on 2/7 in the PM at [place]. Called patient to advise. Phone rang, then a busy signal. Attempted again, same outcome.

## 2024-02-08 NOTE — TELEPHONE ENCOUNTER
Per Omnipod rep on 2/7: Angelia Desai [birth year] pt ernesto Peoples. Pt was trained on Omnipod 5 in Manual Mode as she is currently using Dexcom G7 and plans to wait for Omnipod 5 integration with G7 planned for later this year.

## 2024-02-15 ENCOUNTER — OFFICE VISIT (OUTPATIENT)
Dept: URGENT CARE | Facility: CLINIC | Age: 54
End: 2024-02-15
Payer: COMMERCIAL

## 2024-02-15 VITALS
RESPIRATION RATE: 18 BRPM | BODY MASS INDEX: 47.09 KG/M2 | DIASTOLIC BLOOD PRESSURE: 70 MMHG | WEIGHT: 293 LBS | HEIGHT: 66 IN | SYSTOLIC BLOOD PRESSURE: 120 MMHG | TEMPERATURE: 98 F | OXYGEN SATURATION: 98 % | HEART RATE: 87 BPM

## 2024-02-15 DIAGNOSIS — H66.001 NON-RECURRENT ACUTE SUPPURATIVE OTITIS MEDIA OF RIGHT EAR WITHOUT SPONTANEOUS RUPTURE OF TYMPANIC MEMBRANE: Primary | ICD-10-CM

## 2024-02-15 PROCEDURE — S9088 SERVICES PROVIDED IN URGENT: HCPCS | Performed by: NURSE PRACTITIONER

## 2024-02-15 PROCEDURE — 99213 OFFICE O/P EST LOW 20 MIN: CPT | Performed by: NURSE PRACTITIONER

## 2024-02-15 RX ORDER — OLANZAPINE 5 MG/1
TABLET ORAL
COMMUNITY
Start: 2023-11-16

## 2024-02-15 RX ORDER — OLANZAPINE 15 MG/1
TABLET ORAL
COMMUNITY
Start: 2024-02-03

## 2024-02-15 RX ORDER — AMOXICILLIN 875 MG/1
875 TABLET, COATED ORAL 2 TIMES DAILY
Qty: 14 TABLET | Refills: 0 | Status: SHIPPED | OUTPATIENT
Start: 2024-02-15 | End: 2024-02-22
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
## 2024-02-21 NOTE — TELEPHONE ENCOUNTER
As a final attempt, a third outreach has been made via telephone call to facility. Please see Contacts section for details. This encounter will be closed and completed by end of day. Should we receive the requested information because of previous outreach attempts, the requested patient's chart will be updated appropriately.     Thank you  Roseline Thorpe

## 2024-03-04 ENCOUNTER — HOSPITAL ENCOUNTER (OUTPATIENT)
Dept: GASTROENTEROLOGY | Facility: HOSPITAL | Age: 54
Setting detail: OUTPATIENT SURGERY
Discharge: HOME | End: 2024-03-04
Payer: COMMERCIAL

## 2024-03-04 ENCOUNTER — ANESTHESIA (OUTPATIENT)
Dept: GASTROENTEROLOGY | Facility: HOSPITAL | Age: 54
End: 2024-03-04

## 2024-03-04 ENCOUNTER — ANESTHESIA EVENT (OUTPATIENT)
Dept: GASTROENTEROLOGY | Facility: HOSPITAL | Age: 54
End: 2024-03-04

## 2024-03-04 VITALS
BODY MASS INDEX: 47.09 KG/M2 | OXYGEN SATURATION: 99 % | RESPIRATION RATE: 18 BRPM | WEIGHT: 293 LBS | HEIGHT: 66 IN | HEART RATE: 84 BPM | TEMPERATURE: 97.2 F | DIASTOLIC BLOOD PRESSURE: 56 MMHG | SYSTOLIC BLOOD PRESSURE: 129 MMHG

## 2024-03-04 DIAGNOSIS — Z12.11 SCREENING FOR COLON CANCER: ICD-10-CM

## 2024-03-04 LAB — GLUCOSE SERPL-MCNC: 144 MG/DL (ref 65–140)

## 2024-03-04 PROCEDURE — G0121 COLON CA SCRN NOT HI RSK IND: HCPCS | Performed by: INTERNAL MEDICINE

## 2024-03-04 PROCEDURE — 82948 REAGENT STRIP/BLOOD GLUCOSE: CPT

## 2024-03-04 RX ORDER — LIDOCAINE HYDROCHLORIDE 20 MG/ML
INJECTION, SOLUTION EPIDURAL; INFILTRATION; INTRACAUDAL; PERINEURAL AS NEEDED
Status: DISCONTINUED | OUTPATIENT
Start: 2024-03-04 | End: 2024-03-04

## 2024-03-04 RX ORDER — PROPOFOL 10 MG/ML
INJECTION, EMULSION INTRAVENOUS AS NEEDED
Status: DISCONTINUED | OUTPATIENT
Start: 2024-03-04 | End: 2024-03-04

## 2024-03-04 RX ORDER — PROPOFOL 10 MG/ML
INJECTION, EMULSION INTRAVENOUS CONTINUOUS PRN
Status: DISCONTINUED | OUTPATIENT
Start: 2024-03-04 | End: 2024-03-04

## 2024-03-04 RX ORDER — SODIUM CHLORIDE, SODIUM LACTATE, POTASSIUM CHLORIDE, CALCIUM CHLORIDE 600; 310; 30; 20 MG/100ML; MG/100ML; MG/100ML; MG/100ML
INJECTION, SOLUTION INTRAVENOUS CONTINUOUS PRN
Status: DISCONTINUED | OUTPATIENT
Start: 2024-03-04 | End: 2024-03-04

## 2024-03-04 RX ADMIN — PROPOFOL 80 MG: 10 INJECTION, EMULSION INTRAVENOUS at 08:26

## 2024-03-04 RX ADMIN — PROPOFOL 30 MG: 10 INJECTION, EMULSION INTRAVENOUS at 08:33

## 2024-03-04 RX ADMIN — LIDOCAINE HYDROCHLORIDE 100 MG: 20 INJECTION, SOLUTION EPIDURAL; INFILTRATION; INTRACAUDAL; PERINEURAL at 08:26

## 2024-03-04 RX ADMIN — SODIUM CHLORIDE, SODIUM LACTATE, POTASSIUM CHLORIDE, AND CALCIUM CHLORIDE: .6; .31; .03; .02 INJECTION, SOLUTION INTRAVENOUS at 08:23

## 2024-03-04 RX ADMIN — PROPOFOL 30 MG: 10 INJECTION, EMULSION INTRAVENOUS at 08:44

## 2024-03-04 RX ADMIN — PROPOFOL 100 MCG/KG/MIN: 10 INJECTION, EMULSION INTRAVENOUS at 08:26
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
## 2024-03-04 NOTE — ANESTHESIA PREPROCEDURE EVALUATION
Procedure:  COLONOSCOPY    Relevant Problems   CARDIO   (+) Essential hypertension      ENDO   (+) Type 2 diabetes mellitus with hyperglycemia, with long-term current use of insulin (HCC)      GI/HEPATIC   (+) Gastroesophageal reflux disease without esophagitis      PULMONARY   (+) Mild persistent asthma without complication   (+) AURELIO (obstructive sleep apnea)   (+) Smoker      GLP1 and SGLT2 hold intervals appropriate  Ongoing tobacco abuse  BMI 49.55  Physical Exam    Airway    Mallampati score: III  TM Distance: >3 FB  Neck ROM: full     Dental       Cardiovascular  Cardiovascular exam normal    Pulmonary  Pulmonary exam normal     Other Findings  post-pubertal.      Anesthesia Plan  ASA Score- 3     Anesthesia Type- IV sedation with anesthesia with ASA Monitors.         Additional Monitors:     Airway Plan:     Comment: Pregnancy testing not indicated given BTL and active birth control use. Patient aware of possible adverse effect if pregnant. .       Plan Factors-Exercise tolerance (METS): >4 METS.    Chart reviewed. EKG reviewed. Imaging results reviewed. Existing labs reviewed. Patient summary reviewed.    Patient is a current smoker.  Patient instructed to abstain from smoking on day of procedure. Patient smoked on day of surgery.            Induction- intravenous.    Postoperative Plan-     Informed Consent- Anesthetic plan and risks discussed with patient.  I personally reviewed this patient with the CRNA. Discussed and agreed on the Anesthesia Plan with the CRNA..

## 2024-03-04 NOTE — ANESTHESIA POSTPROCEDURE EVALUATION
Post-Op Assessment Note    CV Status:  Stable  Pain Score: 0    Pain management: adequate       Mental Status:  Arousable   Hydration Status:  Stable   PONV Controlled:  None   Airway Patency:  Patent     Post Op Vitals Reviewed: Yes    No anethesia notable event occurred.    Staff: CRNA               BP   122/60   Temp      Pulse  84   Resp   18   SpO2   97%

## 2024-03-05 ENCOUNTER — OFFICE VISIT (OUTPATIENT)
Dept: FAMILY MEDICINE CLINIC | Facility: CLINIC | Age: 54
End: 2024-03-05
Payer: COMMERCIAL

## 2024-03-05 VITALS
WEIGHT: 293 LBS | DIASTOLIC BLOOD PRESSURE: 78 MMHG | OXYGEN SATURATION: 98 % | SYSTOLIC BLOOD PRESSURE: 134 MMHG | BODY MASS INDEX: 47.09 KG/M2 | TEMPERATURE: 98.2 F | HEART RATE: 84 BPM | HEIGHT: 66 IN

## 2024-03-05 DIAGNOSIS — H69.91 DYSFUNCTION OF RIGHT EUSTACHIAN TUBE: ICD-10-CM

## 2024-03-05 DIAGNOSIS — H66.001 NON-RECURRENT ACUTE SUPPURATIVE OTITIS MEDIA OF RIGHT EAR WITHOUT SPONTANEOUS RUPTURE OF TYMPANIC MEMBRANE: Primary | ICD-10-CM

## 2024-03-05 PROCEDURE — 99214 OFFICE O/P EST MOD 30 MIN: CPT | Performed by: NURSE PRACTITIONER

## 2024-03-05 PROCEDURE — G2211 COMPLEX E/M VISIT ADD ON: HCPCS | Performed by: NURSE PRACTITIONER

## 2024-03-05 RX ORDER — CEFDINIR 300 MG/1
300 CAPSULE ORAL EVERY 12 HOURS SCHEDULED
Qty: 20 CAPSULE | Refills: 0 | Status: SHIPPED | OUTPATIENT
Start: 2024-03-05 | End: 2024-03-15

## 2024-03-05 NOTE — PROGRESS NOTES
Name: Layla Desai      : 1970      MRN: [id number]  Encounter Provider: DMITRY Hammond  Encounter Date: 3/5/2024   Encounter department: [facility] PRIMARY CARE    Assessment & Plan     1. Non-recurrent acute suppurative otitis media of right ear without spontaneous rupture of tympanic membrane  -     cefdinir (OMNICEF) 300 mg capsule; Take 1 capsule (300 mg total) by mouth every 12 (twelve) hours for 10 days    2. Dysfunction of right eustachian tube        Depression Screening and Follow-up Plan: Patient was screened for depression during today's encounter. They screened negative with a PHQ-2 score of 0.        Subjective       Patient here with complaints of an ear infection.  Ear pain in right ear with popping.  Took antibiotics and has been using OTC drops. Had fluid behind ear drum  per urgent care.  Has sallie using flonase.       Review of Systems   Constitutional: Negative.  Negative for fatigue and fever.   HENT:  Positive for congestion, ear pain, postnasal drip and rhinorrhea.    Respiratory: Negative.  Negative for shortness of breath and wheezing.    Cardiovascular: Negative.  Negative for chest pain and palpitations.   Skin: Negative.  Negative for rash.   Neurological: Negative.  Negative for dizziness, light-headedness and headaches.   Psychiatric/Behavioral: Negative.  Negative for decreased concentration. The patient is not nervous/anxious.        Current Outpatient Medications on File Prior to Visit   Medication Sig    acetaminophen (TYLENOL) 500 mg tablet Take 500 mg by mouth every 6 (six) hours as needed for mild pain    albuterol (PROVENTIL HFA,VENTOLIN HFA) 90 mcg/act inhaler Inhale 2 puffs every 6 (six) hours as needed for wheezing    atorvastatin (LIPITOR) 10 mg tablet TAKE 1 TABLET (10 MG TOTAL) BY MOUTH DAILY    B-D UF III MINI PEN NEEDLES 31G X 5 MM MISC USE TO INJECT INSULIN 4 TIMES DAILY IN CASE OF PUMP FAILURE.    Continuous Blood Gluc Sensor (Dexcom G6  Sensor) MISC Disp 1 box of 3 sensors with 12 refills change sensor q 10 days    Continuous Blood Gluc Transmit (Dexcom G6 Transmitter) MISC Dispense 1 kit, change every 90 days    cyanocobalamin 1,000 mcg/mL Inject 1 mL (1,000 mcg total) into a muscle every 30 (thirty) days Do not start before November 6, 2023.    doxepin (SINEquan) 25 mg capsule Take 1 capsule (25 mg total) by mouth daily at bedtime    etonogestrel (NEXPLANON) subdermal implant Inject under the skin    fluticasone (FLONASE) 50 mcg/act nasal spray 1 spray into each nostril daily    gabapentin (NEURONTIN) 100 mg capsule Take 2 capsules (200 mg total) by mouth 3 (three) times a day    glucose blood (FREESTYLE TEST STRIPS) test strip Use to check blood sugar four times a day in case of CGM failure    hydrOXYzine HCL (ATARAX) 50 mg tablet Take 2 tablets (100 mg total) by mouth daily at bedtime    Insulin Disposable Pump (Omnipod 5 G6 Intro, Gen 5,) KIT Disp one kit for insulin control    Insulin Disposable Pump (Omnipod 5 G6 Pod, Gen 5,) MISC Use to disp insulin continuously, replace every 3 days, max daily dose of 200 daily    Insulin Disposable Pump (Omnipod DASH Pods, Gen 4,) MISC Use 1 Cartridge every third day    Jardiance 25 MG TABS TAKE 1 TABLET (25 MG TOTAL) BY MOUTH EVERY MORNING    multivitamin (THERAGRAN) TABS Take 1 tablet by mouth daily    NovoLOG 100 UNIT/ML injection INJECT 300 UNITS INTO OMNIPOD EVERY 72 HOURS    NovoLOG FlexPen 100 units/mL injection pen INJECT 12-14 UNITS PRIOR TO EACH MEAL 3 TIMES A DAY INCASE OF PUMP FAILURE    nystatin (MYCOSTATIN) powder Apply topically 2 (two) times a day    OLANZapine (ZyPREXA) 15 mg tablet     OLANZapine (ZyPREXA) 5 mg tablet     omeprazole (PriLOSEC) 40 MG capsule TAKE 1 CAPSULE (40 MG TOTAL) BY MOUTH DAILY    Ozempic, 1 MG/DOSE, 4 MG/3ML injection pen INJECT UNDER THE SKIN 1MG WEEKLY    venlafaxine (EFFEXOR-XR) 150 mg 24 hr capsule Take 1 capsule (150 mg total) by mouth daily Do not start  "before October 12, 2023.    cholecalciferol (VITAMIN D3) 1,000 units tablet Take 1 tablet (1,000 Units total) by mouth daily Do not start before November 12, 2023. (Patient not taking: Reported on 2/15/2024)    ergocalciferol (VITAMIN D2) 50,000 units Take 1 capsule (50,000 Units total) by mouth once a week for 5 doses Do not start before October 14, 2023.    Levemir FlexPen 100 units/mL injection pen INJECT 36 UNITS UNDER THE SKIN DAILY AT BEDTIME (Patient not taking: Reported on 1/23/2024)    nicotine (NICODERM CQ) 21 mg/24 hr TD 24 hr patch Place 1 patch on the skin over 24 hours daily Do not start before October 12, 2023. (Patient not taking: Reported on 10/18/2023)    polyethylene glycol (GOLYTELY) 4000 mL solution Take 4,000 mL by mouth once for 1 dose       Objective     /78   Pulse 84   Temp 98.2 °F (36.8 °C)   Ht 5' 6\" (1.676 m)   Wt (!) 143 kg (315 lb 3.2 oz)   SpO2 98%   BMI 50.87 kg/m²     Physical Exam  Vitals and nursing note reviewed.   Constitutional:       General: She is not in acute distress.     Appearance: She is well-developed. She is not ill-appearing.   HENT:      Head: Normocephalic and atraumatic.      Right Ear: Ear canal normal. A middle ear effusion is present. Tympanic membrane is scarred, erythematous and bulging.      Left Ear: Ear canal normal. Tympanic membrane is scarred.   Neck:      Trachea: No tracheal deviation.   Pulmonary:      Effort: Pulmonary effort is normal. No tachypnea, accessory muscle usage or respiratory distress.      Breath sounds: No stridor.   Musculoskeletal:      Cervical back: No rigidity.   Neurological:      Mental Status: She is alert and oriented to person, place, and time.   Psychiatric:         Speech: Speech normal.         Behavior: Behavior normal. Behavior is cooperative.       DMITRY Hammond    "

## 2024-03-14 ENCOUNTER — TELEPHONE (OUTPATIENT)
Age: 54
End: 2024-03-14

## 2024-03-14 NOTE — TELEPHONE ENCOUNTER
Pt said 2 of her Dexcom g7 samples malfunctioned. She did attempt to call Dexcom to have replaced but was unable to get through. I confirmed with the [facility] office that they will set aside a G7 sample for her. I confirmed with patient that she has Dexcom's number to request replacements.

## 2024-03-30 DIAGNOSIS — E78.00 HYPERCHOLESTEROLEMIA: ICD-10-CM

## 2024-04-01 RX ORDER — ATORVASTATIN CALCIUM 10 MG/1
10 TABLET, FILM COATED ORAL DAILY
Qty: 90 TABLET | Refills: 0 | Status: SHIPPED | OUTPATIENT
Start: 2024-04-01

## 2024-04-01 NOTE — TELEPHONE ENCOUNTER
Patient requesting refill(s) of: atorvastatin 10 mg daily     Last filled: 1/3/2024 #90 x 0  Last appt: 3/5/2024  Next appt: 7/24/2024  [facility] \[facility].\""

## 2024-04-03 ENCOUNTER — TELEPHONE (OUTPATIENT)
Age: 54
End: 2024-04-03

## 2024-04-03 DIAGNOSIS — Z79.4 TYPE 2 DIABETES MELLITUS WITH HYPERGLYCEMIA, WITH LONG-TERM CURRENT USE OF INSULIN (HCC): ICD-10-CM

## 2024-04-03 DIAGNOSIS — E11.65 TYPE 2 DIABETES MELLITUS WITH HYPERGLYCEMIA, WITH LONG-TERM CURRENT USE OF INSULIN (HCC): ICD-10-CM

## 2024-04-03 RX ORDER — INSULIN PMP CART,AUT,G6/7,CNTR
EACH SUBCUTANEOUS
Qty: 2 EACH | Refills: 0 | Status: SHIPPED | OUTPATIENT
Start: 2024-04-03

## 2024-04-03 NOTE — TELEPHONE ENCOUNTER
Patient called asking for a refill of her Omnipod 5 sensors. The only order I see, is the kit or the misc, which has no refills. Please advise.     She would like them sent to [facility] in [place], [place]

## 2024-04-03 NOTE — TELEPHONE ENCOUNTER
Spoke to patient to confirm what sensor or pod she needed.   Patient needed her Omnipod 5 pods sent to [facility].  She also needed a G7 sensor sample as she currently has nothing to check her bg, as she is waiting on her shipment to arrive.   Sensor is up at the  with her name on it. Patient will be stopping by tomorrow.

## 2024-04-05 DIAGNOSIS — F41.1 GENERALIZED ANXIETY DISORDER: ICD-10-CM

## 2024-04-05 RX ORDER — HYDROXYZINE 50 MG/1
100 TABLET, FILM COATED ORAL
Qty: 60 TABLET | Refills: 3 | Status: SHIPPED | OUTPATIENT
Start: 2024-04-05

## 2024-04-05 NOTE — TELEPHONE ENCOUNTER
Patient requesting refill(s) of: hydroxyzine     Last filled: 12/19/23  Last appt: 3/5/24  Next appt: 7/24/24  Pharmacy: [facility]

## 2024-04-25 ENCOUNTER — TELEPHONE (OUTPATIENT)
Age: 54
End: 2024-04-25

## 2024-04-25 NOTE — TELEPHONE ENCOUNTER
Patient called saying her pharmacy faxed over paperwork for us to fill out in order for her to get her G7 Sensors. She also said she is out of sensors and asked if we had any sensors. Please advise.

## 2024-04-26 DIAGNOSIS — E11.65 TYPE 2 DIABETES MELLITUS WITH HYPERGLYCEMIA, WITH LONG-TERM CURRENT USE OF INSULIN (HCC): ICD-10-CM

## 2024-04-26 DIAGNOSIS — Z79.4 TYPE 2 DIABETES MELLITUS WITH HYPERGLYCEMIA, WITH LONG-TERM CURRENT USE OF INSULIN (HCC): ICD-10-CM

## 2024-04-26 LAB
DME PARACHUTE DELIVERY DATE ACTUAL: NORMAL
DME PARACHUTE DELIVERY DATE REQUESTED: NORMAL
DME PARACHUTE ITEM DESCRIPTION: NORMAL
DME PARACHUTE ORDER STATUS: NORMAL
DME PARACHUTE SUPPLIER NAME: NORMAL
DME PARACHUTE SUPPLIER PHONE: NORMAL

## 2024-04-26 RX ORDER — INSULIN PMP CART,AUT,G6/7,CNTR
EACH SUBCUTANEOUS
Qty: 2 EACH | Refills: 0 | Status: CANCELLED | OUTPATIENT
Start: 2024-04-26

## 2024-04-26 NOTE — TELEPHONE ENCOUNTER
Patient stopped to  her G7 senors and said she also needs a refill on her Omni Pod  pump from [facility] in [place]. Any questions please call patient at [phone]

## 2024-04-26 NOTE — PROGRESS NOTES
Zhao 7 sensors ordered in [facility]    Order tracker will be shared with the patient    An SMS with a link to the order tracker will be sent to [phone]

## 2024-04-26 NOTE — TELEPHONE ENCOUNTER
Called patient.  Set aside G7 sensors up front for her to .  Ordered sensors via [place] per patient request

## 2024-04-26 NOTE — TELEPHONE ENCOUNTER
Reason for call:   [x] Refill   [] Prior Auth  [] Other:     Office:   [] PCP/Provider -   [x] Specialty/Provider -     Medication: Omnipod 5 G6 POds    Dose/Frequency: Use to disp insulin continuously, replace every 3 days.    Quantity: #2    Pharmacy: [facility]    Does the patient have enough for 3 days?   [] Yes   [x] No - Send as HP to POD

## 2024-04-29 RX ORDER — INSULIN PMP CART,AUT,G6/7,CNTR
EACH SUBCUTANEOUS
Qty: 10 EACH | Refills: 1 | Status: SHIPPED | OUTPATIENT
Start: 2024-04-29 | End: 2024-05-01 | Stop reason: SDUPTHER

## 2024-05-01 DIAGNOSIS — Z79.4 TYPE 2 DIABETES MELLITUS WITH HYPERGLYCEMIA, WITH LONG-TERM CURRENT USE OF INSULIN (HCC): ICD-10-CM

## 2024-05-01 DIAGNOSIS — E11.65 TYPE 2 DIABETES MELLITUS WITH HYPERGLYCEMIA, WITH LONG-TERM CURRENT USE OF INSULIN (HCC): ICD-10-CM

## 2024-05-01 RX ORDER — INSULIN PMP CART,AUT,G6/7,CNTR
EACH SUBCUTANEOUS
Qty: 30 EACH | Refills: 5 | Status: SHIPPED | OUTPATIENT
Start: 2024-05-01

## 2024-05-01 NOTE — TELEPHONE ENCOUNTER
Pharmacy called needing prescription for patient.     Reason for call:   [x] Refill   [] Prior Auth  [] Other:     Office:   [] PCP/Provider -   [x] Specialty/Provider - Endocrinology     Medication: Omnipod 5 G6 pods Gen 5    Dose/Frequency: Use to dispense insulin continuously every 3 days     Quantity: 30 each     Pharmacy:   [facility] - [place] -   [address] [place] [address] [phone]     Does the patient have enough for 3 days?   [x] Yes   [] No - Send as HP to POD

## 2024-05-31 ENCOUNTER — PROCEDURE VISIT (OUTPATIENT)
Dept: OBGYN CLINIC | Facility: CLINIC | Age: 54
End: 2024-05-31
Payer: COMMERCIAL

## 2024-05-31 VITALS
BODY MASS INDEX: 47.09 KG/M2 | HEIGHT: 66 IN | WEIGHT: 293 LBS | DIASTOLIC BLOOD PRESSURE: 82 MMHG | SYSTOLIC BLOOD PRESSURE: 130 MMHG

## 2024-05-31 DIAGNOSIS — Z30.46 ENCOUNTER FOR REMOVAL AND REINSERTION OF NEXPLANON: Primary | ICD-10-CM

## 2024-05-31 PROCEDURE — 11983 REMOVE/INSERT DRUG IMPLANT: CPT | Performed by: ADVANCED PRACTICE MIDWIFE

## 2024-05-31 NOTE — PROGRESS NOTES
"[facility] [address] [place]    Assessment/Plan:  No problem-specific Assessment & Plan notes found for this encounter.    Diagnoses and all orders for this visit:    Encounter for removal and reinsertion of Nexplanon  -     Remove and insert drug implant          Subjective:   Layla Desai is a 53 y.o.  female.  CC: desires removal and reinsertion of nexplanon until through menopause    HPI: Layla states that she has had Nexplanon for heavy bleeding. Recently started spotting. This Nexplanon has been in 5 yrs. Notes that she does not want to go through bleeding of onset of menopause.       ROS: Review of Systems   Constitutional:  Negative for chills, fatigue and fever.   Respiratory:  Negative for cough and shortness of breath.    Cardiovascular:  Negative for chest pain and palpitations.   Gastrointestinal:  Negative for constipation and diarrhea.   Genitourinary:  Positive for menstrual problem. Negative for difficulty urinating, dysuria, vaginal bleeding, vaginal discharge and vaginal pain.       PFSH: The following portions of the patient's history were reviewed and updated as appropriate: allergies, current medications, past family history, past medical history, obstetric history, gynecologic history, past social history, past surgical history and problem list.       Objective:  /82   Ht 5' 6\" (1.676 m)   Wt (!) 143 kg (314 lb 9.6 oz)   LMP  (LMP Unknown)   BMI 50.78 kg/m²    Physical Exam  Vitals reviewed.   Constitutional:       Appearance: Normal appearance.   Skin:     Comments: Nexplaon in left upper arm. Easily palpable.    Neurological:      Mental Status: She is alert and oriented to person, place, and time.   Psychiatric:         Mood and Affect: Mood normal.         Behavior: Behavior normal.        Universal Protocol:  Consent: Written consent obtained.  Risks and benefits: risks, benefits and alternatives were discussed  Consent given " by: patient  Patient understanding: patient states understanding of the procedure being performed  Patient consent: the patient's understanding of the procedure matches consent given  Procedure consent: procedure consent matches procedure scheduled  Relevant documents: relevant documents present and verified  Test results: test results available and properly labeled  Site marked: the operative site was marked  Radiology Images displayed and confirmed. If images not available, report reviewed: imaging studies available  Required items: required blood products, implants, devices, and special equipment available  Patient identity confirmed: verbally with patient  Remove and insert drug implant    Date/Time: 5/31/2024 8:00 AM    Performed by: Cherri Sears CNM  Authorized by: Cherri Sears CNM    Indication:     Indication: Presence of non-biodegradable drug delivery implant      Indication comment:  Desires removal and reinsertion of new implant  Pre-procedure:     Prepped with: alcohol 70% and chlorhexidine gluconate      Local anesthetic:  Lidocaine 1%    The site was cleaned and prepped in a sterile fashion: yes    Procedure:     Procedure:  Removal with reinsertion    Small stab incision was made in arm: yes      Left/right:  Left    Preloaded contraceptive capsule trocar was placed subdermally: yes      Visualization of implant was obtained: yes (removed without difficulty)      Contraceptive capsule was inserted and trocar removed: yes      Visualization of notch in stylet and palpation of device: yes      Palpation confirms placement by provider and patient: yes      Site was closed with steri-strips and pressure bandage applied: yes    Comments:      Instructed to keep clean and dry for next 24 hours.  Allow steri strips to stay in place for 3 days, then may remove and keep covered with band-aid until healed. Reviewed s/s infection to call office if notice any changes. May have some bruising as it heals. Can take  ibuprofen 600 mg q 8 hours, if needed.

## 2024-06-27 DIAGNOSIS — E78.00 HYPERCHOLESTEROLEMIA: ICD-10-CM

## 2024-06-28 RX ORDER — ATORVASTATIN CALCIUM 10 MG/1
10 TABLET, FILM COATED ORAL DAILY
Qty: 30 TABLET | Refills: 0 | Status: SHIPPED | OUTPATIENT
Start: 2024-06-28

## 2024-07-18 ENCOUNTER — TELEPHONE (OUTPATIENT)
Age: 54
End: 2024-07-18

## 2024-07-18 DIAGNOSIS — Z79.4 TYPE 2 DIABETES MELLITUS WITHOUT COMPLICATION, WITH LONG-TERM CURRENT USE OF INSULIN (HCC): ICD-10-CM

## 2024-07-18 DIAGNOSIS — E66.01 MORBID OBESITY (HCC): ICD-10-CM

## 2024-07-18 DIAGNOSIS — E11.9 TYPE 2 DIABETES MELLITUS WITHOUT COMPLICATION, WITH LONG-TERM CURRENT USE OF INSULIN (HCC): ICD-10-CM

## 2024-07-18 NOTE — TELEPHONE ENCOUNTER
Patient would like to know if there are any dexcom samples she can  at the office. Please follow up with patient. Thank you

## 2024-07-21 DIAGNOSIS — F41.1 GENERALIZED ANXIETY DISORDER: ICD-10-CM

## 2024-07-21 RX ORDER — HYDROXYZINE 50 MG/1
100 TABLET, FILM COATED ORAL
Qty: 60 TABLET | Refills: 5 | Status: SHIPPED | OUTPATIENT
Start: 2024-07-21

## 2024-07-22 ENCOUNTER — TELEPHONE (OUTPATIENT)
Age: 54
End: 2024-07-22

## 2024-07-22 DIAGNOSIS — Z79.4 TYPE 2 DIABETES MELLITUS WITHOUT COMPLICATION, WITH LONG-TERM CURRENT USE OF INSULIN (HCC): ICD-10-CM

## 2024-07-22 DIAGNOSIS — E66.01 MORBID OBESITY (HCC): ICD-10-CM

## 2024-07-22 DIAGNOSIS — E11.9 TYPE 2 DIABETES MELLITUS WITHOUT COMPLICATION, WITH LONG-TERM CURRENT USE OF INSULIN (HCC): ICD-10-CM

## 2024-07-22 NOTE — TELEPHONE ENCOUNTER
PA for semaglutide, 1 mg/dose, (Ozempic, 1 MG/DOSE,) 4 mg/3 mL injection pen     Submitted via    [][employer]KEY    []Xockets-Case ID #    []Faxed to plan   [x]Other website [id number]  []Phone call Case ID #      Office notes sent, clinical questions answered. Awaiting determination    Turnaround time for your insurance to make a decision on your Prior Authorization can take 7-21 business days.

## 2024-07-24 ENCOUNTER — OFFICE VISIT (OUTPATIENT)
Dept: FAMILY MEDICINE CLINIC | Facility: CLINIC | Age: 54
End: 2024-07-24
Payer: COMMERCIAL

## 2024-07-24 VITALS
WEIGHT: 293 LBS | OXYGEN SATURATION: 95 % | SYSTOLIC BLOOD PRESSURE: 120 MMHG | BODY MASS INDEX: 47.09 KG/M2 | DIASTOLIC BLOOD PRESSURE: 74 MMHG | TEMPERATURE: 97.6 F | RESPIRATION RATE: 18 BRPM | HEART RATE: 81 BPM | HEIGHT: 66 IN

## 2024-07-24 DIAGNOSIS — G47.33 SEVERE OBSTRUCTIVE SLEEP APNEA: ICD-10-CM

## 2024-07-24 DIAGNOSIS — E11.65 TYPE 2 DIABETES MELLITUS WITH HYPERGLYCEMIA, WITH LONG-TERM CURRENT USE OF INSULIN (HCC): ICD-10-CM

## 2024-07-24 DIAGNOSIS — Z79.4 TYPE 2 DIABETES MELLITUS WITHOUT COMPLICATION, WITH LONG-TERM CURRENT USE OF INSULIN (HCC): ICD-10-CM

## 2024-07-24 DIAGNOSIS — E11.9 TYPE 2 DIABETES MELLITUS WITHOUT COMPLICATION, WITH LONG-TERM CURRENT USE OF INSULIN (HCC): ICD-10-CM

## 2024-07-24 DIAGNOSIS — Z79.4 TYPE 2 DIABETES MELLITUS WITH HYPERGLYCEMIA, WITH LONG-TERM CURRENT USE OF INSULIN (HCC): ICD-10-CM

## 2024-07-24 DIAGNOSIS — E66.01 MORBID OBESITY (HCC): ICD-10-CM

## 2024-07-24 DIAGNOSIS — Z12.31 SCREENING MAMMOGRAM FOR BREAST CANCER: Primary | ICD-10-CM

## 2024-07-24 LAB
LEFT EYE DIABETIC RETINOPATHY: ABNORMAL
LEFT EYE IMAGE QUALITY: ABNORMAL
LEFT EYE MACULAR EDEMA: ABNORMAL
LEFT EYE OTHER RETINOPATHY: ABNORMAL
RIGHT EYE DIABETIC RETINOPATHY: ABNORMAL
RIGHT EYE IMAGE QUALITY: ABNORMAL
RIGHT EYE MACULAR EDEMA: ABNORMAL
RIGHT EYE OTHER RETINOPATHY: ABNORMAL
SEVERITY (EYE EXAM): ABNORMAL
SL AMB POCT HEMOGLOBIN AIC: 8.7 (ref ?–6.5)

## 2024-07-24 PROCEDURE — 83036 HEMOGLOBIN GLYCOSYLATED A1C: CPT | Performed by: NURSE PRACTITIONER

## 2024-07-24 PROCEDURE — 99214 OFFICE O/P EST MOD 30 MIN: CPT | Performed by: NURSE PRACTITIONER
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
## 2024-07-25 DIAGNOSIS — E78.00 HYPERCHOLESTEROLEMIA: ICD-10-CM

## 2024-07-25 RX ORDER — ATORVASTATIN CALCIUM 10 MG/1
10 TABLET, FILM COATED ORAL DAILY
Qty: 30 TABLET | Refills: 0 | OUTPATIENT
Start: 2024-07-25

## 2024-07-25 NOTE — TELEPHONE ENCOUNTER
Courtesy refill previously given.    Patient needs updated blood work and has previously placed orders. Please contact patient to go for labs.

## 2024-07-26 RX ORDER — ATORVASTATIN CALCIUM 10 MG/1
10 TABLET, FILM COATED ORAL DAILY
Qty: 90 TABLET | Refills: 3 | Status: SHIPPED | OUTPATIENT
Start: 2024-07-26

## 2024-08-06 DIAGNOSIS — E11.65 TYPE 2 DIABETES MELLITUS WITH HYPERGLYCEMIA, WITH LONG-TERM CURRENT USE OF INSULIN (HCC): ICD-10-CM

## 2024-08-06 DIAGNOSIS — Z79.4 TYPE 2 DIABETES MELLITUS WITH HYPERGLYCEMIA, WITH LONG-TERM CURRENT USE OF INSULIN (HCC): ICD-10-CM

## 2024-08-06 RX ORDER — EMPAGLIFLOZIN 25 MG/1
25 TABLET, FILM COATED ORAL EVERY MORNING
Qty: 100 TABLET | Refills: 1 | Status: SHIPPED | OUTPATIENT
Start: 2024-08-06

## 2024-08-16 DIAGNOSIS — Z79.4 TYPE 2 DIABETES MELLITUS WITH HYPERGLYCEMIA, WITH LONG-TERM CURRENT USE OF INSULIN (HCC): ICD-10-CM

## 2024-08-16 DIAGNOSIS — E11.65 TYPE 2 DIABETES MELLITUS WITH HYPERGLYCEMIA, WITH LONG-TERM CURRENT USE OF INSULIN (HCC): ICD-10-CM

## 2024-08-16 NOTE — TELEPHONE ENCOUNTER
Reason for call:   [x] Refill   [] Prior Auth  [] Other:     Office:   [] PCP/Provider -   [x] Specialty/Provider - DMITRY Hunter     Medication: NovoLOG 100 UNIT/ML injection    Dose/Frequency: INJECT 300 UNITS INTO OMNIPOD EVERY 72 HOURS     Quantity: 90 mL    Pharmacy: [facility] - [address] [phone]        Does the patient have enough for 3 days?   [x] Yes   [] No - Send as HP to POD

## 2024-08-20 RX ORDER — INSULIN ASPART 100 [IU]/ML
INJECTION, SOLUTION INTRAVENOUS; SUBCUTANEOUS
Qty: 90 ML | Refills: 1 | Status: SHIPPED | OUTPATIENT
Start: 2024-08-20

## 2024-09-24 ENCOUNTER — TELEPHONE (OUTPATIENT)
Age: 54
End: 2024-09-24

## 2024-09-24 NOTE — TELEPHONE ENCOUNTER
Attempted to call patient. I get a busy signal after it rings both times.  I have Dexcom G7 samples set aside for her

## 2024-10-04 NOTE — PROGRESS NOTES
Ambulatory Visit  Name: Layla Desai      : 1970      MRN: [id number]  Encounter Provider: DMITRY Hunter  Encounter Date: 10/9/2024   Encounter department: [facility] [place]    Assessment & Plan  Type 2 diabetes mellitus with hyperglycemia, with long-term current use of insulin (HCC)  Poorly controlled. Increase Ozempic to 2 mg once weekly. Continue Jardiance 25 mg daily. The following adjustments have been made to pump settings:    Current Insulin pump settings:  See Scanned Pump Downloads  Basal Rate: 1.5--> 1.75  Carb Ratio: 6  Sensitivity: 24  Active Insulin Time: 4 hours--> 3  hours    Unfortunately, current omnipod users are not yet approved to receive the Dexcom G7 for the closed loop system. Once I am informed that they are, we will notify the patient so that she can avail herself of the technology. In the meantime, improve diet. Encouraged to increased physical activity. F/U in 4 months.   Lab Results   Component Value Date    HGBA1C 8.7 (A) 2024       Orders:    Hemoglobin A1C; Future    Albumin / creatinine urine ratio; Future    Basic metabolic panel; Future    semaglutide, 2 mg/dose, (Ozempic, 2 MG/DOSE,) 8 mg/ mL injection pen; Inject 0.75 mL (2 mg total) under the skin every 7 days    Essential hypertension  /68.         Morbid obesity (HCC)  Patient has gained approximately 30 lbs since December due to increased calories. Strongly encouraged to resume healthier diet as she had been losing weight. Increase Ozempic to 2 mg once weekly. Explained that she will only benefit from the medication if also adhering to diet and lifestyle modification.            History of Present Illness     Layla Desai is a 53 y.o. female with type 2 diabetes with long term use of insulin presenting today for late follow up.    Was last seen in the office over a year ago on 2023.    At that time, she was well-controlled using an Omnipod  pump as well as Jardiance and Ozempic.    Hemoglobin A1c is now uncontrolled at 8.7%.    Patient changed her CGM to the Dexcom G7 which does not communicate with her pump so she is in manual mode 100% of the time.    She reports she has not been mindful of her diet.     Hemoglobin A1C   Latest Ref Rng 6.5    6/21/2023 6.8 !    10/5/2023 6.7 (H)    7/24/2024 8.7 !       Legend:  ! Abnormal  (H) High      Patient is on a Insulin Pump Type: Omnipod 5  pump.  Current Problems with Pump: in manual mode 100% of the time    Current Insulin pump settings:  See Scanned Pump Downloads  Basal Rate: 1.5  Carb Ratio: 6  Sensitivity: 24  Active Insulin Time: 4 hours    CGM Interpretation  Laylakim Atwoodyer   Device used Dexcom for Personal Use- Dexcom G7  Indication: Type of Diabetes: Type 2 Diabetes  More than 72 hours of data was reviewed. Report to be scanned to chart.   Date Range: September 26, 2024-October 9, 2024  Analysis of data:   Average Glucose: 181 mg/dl  Coefficient of Variation: 21.2%  SD : 38 mg/dl  Time in Target Range: 50%  Time Above Range: 45% 181 to 250 mg/dL; 5% greater than 250 mg/dL  Time Below Range: 0%   Interpretation of data:   Poorly controlled with persistent hyperglycemia.    Backup Plan: Patient is aware that in case of malfunctioning of the pump or unexplained hyperglycemia to use basal and bolus therapy as backup which is prescribed to the patient. Also notified patient to call clinic and/or pump company if any issues or go to emergency department if signs/symptoms of DKA.       Review of Systems   Constitutional:  Positive for fatigue. Negative for activity change, appetite change and unexpected weight change.   HENT:  Negative for dental problem, sore throat, trouble swallowing and voice change.    Eyes:  Negative for visual disturbance.   Respiratory:  Negative for cough, chest tightness and shortness of breath.    Cardiovascular:  Negative for chest pain, palpitations and leg swelling.  "  Gastrointestinal:  Negative for constipation, diarrhea, nausea and vomiting.   Endocrine: Negative for polydipsia, polyphagia and polyuria.   Genitourinary:  Negative for frequency.   Musculoskeletal:  Negative for arthralgias, back pain, gait problem and myalgias.   Skin:  Negative for wound.   Allergic/Immunologic: Positive for environmental allergies. Negative for food allergies.   Neurological:  Negative for dizziness, weakness, light-headedness, numbness and headaches.   Psychiatric/Behavioral:  Negative for decreased concentration, dysphoric mood and sleep disturbance. The patient is not nervous/anxious.            Objective     /68 (BP Location: Right arm, Patient Position: Sitting, Cuff Size: Standard)   Pulse 82   Temp 98.4 °F (36.9 °C) (Temporal)   Resp 18   Ht 5' 6\" (1.676 m)   Wt (!) 147 kg (324 lb)   SpO2 95%   BMI 52.29 kg/m²     Physical Exam  Vitals reviewed.   Constitutional:       General: She is not in acute distress.     Appearance: She is well-developed. She is obese.   HENT:      Head: Normocephalic and atraumatic.   Eyes:      Conjunctiva/sclera: Conjunctivae normal.   Cardiovascular:      Rate and Rhythm: Normal rate and regular rhythm.      Heart sounds: No murmur heard.  Pulmonary:      Effort: Pulmonary effort is normal. No respiratory distress.      Breath sounds: Normal breath sounds.   Abdominal:      Palpations: Abdomen is soft.      Tenderness: There is no abdominal tenderness.   Musculoskeletal:         General: No swelling.      Cervical back: Neck supple.   Skin:     General: Skin is warm and dry.      Capillary Refill: Capillary refill takes less than 2 seconds.   Neurological:      Mental Status: She is alert and oriented to person, place, and time.   Psychiatric:         Mood and Affect: Mood normal.         "

## 2024-10-09 ENCOUNTER — OFFICE VISIT (OUTPATIENT)
Dept: ENDOCRINOLOGY | Facility: CLINIC | Age: 54
End: 2024-10-09
Payer: COMMERCIAL

## 2024-10-09 VITALS
RESPIRATION RATE: 18 BRPM | HEART RATE: 82 BPM | HEIGHT: 66 IN | OXYGEN SATURATION: 95 % | DIASTOLIC BLOOD PRESSURE: 68 MMHG | BODY MASS INDEX: 47.09 KG/M2 | WEIGHT: 293 LBS | TEMPERATURE: 98.4 F | SYSTOLIC BLOOD PRESSURE: 126 MMHG

## 2024-10-09 DIAGNOSIS — Z79.4 TYPE 2 DIABETES MELLITUS WITH HYPERGLYCEMIA, WITH LONG-TERM CURRENT USE OF INSULIN (HCC): Primary | ICD-10-CM

## 2024-10-09 DIAGNOSIS — E11.65 TYPE 2 DIABETES MELLITUS WITH HYPERGLYCEMIA, WITH LONG-TERM CURRENT USE OF INSULIN (HCC): Primary | ICD-10-CM

## 2024-10-09 DIAGNOSIS — I10 ESSENTIAL HYPERTENSION: ICD-10-CM

## 2024-10-09 DIAGNOSIS — E66.01 MORBID OBESITY (HCC): ICD-10-CM

## 2024-10-09 PROCEDURE — 95251 CONT GLUC MNTR ANALYSIS I&R: CPT | Performed by: NURSE PRACTITIONER

## 2024-10-09 PROCEDURE — 99214 OFFICE O/P EST MOD 30 MIN: CPT | Performed by: NURSE PRACTITIONER

## 2024-10-09 RX ORDER — UBIQUINOL 100 MG
CAPSULE ORAL
COMMUNITY
Start: 2024-09-19

## 2024-10-09 NOTE — ASSESSMENT & PLAN NOTE
Poorly controlled. Increase Ozempic to 2 mg once weekly. Continue Jardiance 25 mg daily. The following adjustments have been made to pump settings:    Current Insulin pump settings:  See Scanned Pump Downloads  Basal Rate: 1.5--> 1.75  Carb Ratio: 6  Sensitivity: 24  Active Insulin Time: 4 hours--> 3  hours    Unfortunately, current omnipod users are not yet approved to receive the Dexcom G7 for the closed loop system. Once I am informed that they are, we will notify the patient so that she can avail herself of the technology. In the meantime, improve diet. Encouraged to increased physical activity. F/U in 4 months.   Lab Results   Component Value Date    HGBA1C 8.7 (A) 07/24/2024       Orders:    Hemoglobin A1C; Future    Albumin / creatinine urine ratio; Future    Basic metabolic panel; Future    semaglutide, 2 mg/dose, (Ozempic, 2 MG/DOSE,) 8 mg/ mL injection pen; Inject 0.75 mL (2 mg total) under the skin every 7 days

## 2024-10-09 NOTE — ASSESSMENT & PLAN NOTE
Patient has gained approximately 30 lbs since December due to increased calories. Strongly encouraged to resume healthier diet as she had been losing weight. Increase Ozempic to 2 mg once weekly. Explained that she will only benefit from the medication if also adhering to diet and lifestyle modification.

## 2024-10-15 ENCOUNTER — TELEPHONE (OUTPATIENT)
Age: 54
End: 2024-10-15

## 2024-10-15 NOTE — TELEPHONE ENCOUNTER
Patient called she state that one of her sensors was bad and the company  will send her another one out but it will take 7 days  she want to know if  we had any samples.

## 2024-10-16 ENCOUNTER — OFFICE VISIT (OUTPATIENT)
Dept: URGENT CARE | Facility: CLINIC | Age: 54
End: 2024-10-16
Payer: COMMERCIAL

## 2024-10-16 VITALS
TEMPERATURE: 97.1 F | SYSTOLIC BLOOD PRESSURE: 154 MMHG | RESPIRATION RATE: 20 BRPM | WEIGHT: 293 LBS | BODY MASS INDEX: 47.09 KG/M2 | HEIGHT: 66 IN | DIASTOLIC BLOOD PRESSURE: 86 MMHG | OXYGEN SATURATION: 99 % | HEART RATE: 83 BPM

## 2024-10-16 DIAGNOSIS — B34.9 ACUTE VIRAL SYNDROME: Primary | ICD-10-CM

## 2024-10-16 DIAGNOSIS — J45.20 MILD INTERMITTENT ASTHMA WITHOUT COMPLICATION: ICD-10-CM

## 2024-10-16 PROCEDURE — S9088 SERVICES PROVIDED IN URGENT: HCPCS | Performed by: ORTHOPAEDIC SURGERY

## 2024-10-16 PROCEDURE — 99213 OFFICE O/P EST LOW 20 MIN: CPT | Performed by: ORTHOPAEDIC SURGERY

## 2024-10-16 RX ORDER — PREDNISONE 10 MG/1
TABLET ORAL
Qty: 18 TABLET | Refills: 0 | Status: SHIPPED | OUTPATIENT
Start: 2024-10-16
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
## 2024-10-17 ENCOUNTER — DOCUMENTATION (OUTPATIENT)
Dept: ADMINISTRATIVE | Facility: OTHER | Age: 54
End: 2024-10-17

## 2024-10-17 NOTE — PROGRESS NOTES
10/22/24 10:29 AM    Patient was called after the Urgent Care visit ; a message was left for the patient to return the call    Thank you.  Aysha Coello MA  [facility]          Marina De León RN  P Patient Reported Team         Blood pressure elevated  Appointment department: [facility]  Appointment provider: Meghan Leiva PA-C  Blood pressure  10/16/24 1210 154/86  10/16/24 1207 144/91

## 2024-10-22 VITALS — DIASTOLIC BLOOD PRESSURE: 86 MMHG | SYSTOLIC BLOOD PRESSURE: 154 MMHG

## 2024-10-28 ENCOUNTER — OFFICE VISIT (OUTPATIENT)
Dept: URGENT CARE | Facility: CLINIC | Age: 54
End: 2024-10-28
Payer: COMMERCIAL

## 2024-10-28 VITALS
SYSTOLIC BLOOD PRESSURE: 156 MMHG | DIASTOLIC BLOOD PRESSURE: 92 MMHG | HEART RATE: 81 BPM | RESPIRATION RATE: 18 BRPM | TEMPERATURE: 98.7 F | OXYGEN SATURATION: 99 %

## 2024-10-28 DIAGNOSIS — L03.116 CELLULITIS OF LEFT LOWER LEG: Primary | ICD-10-CM

## 2024-10-28 DIAGNOSIS — I10 HYPERTENSION, UNSPECIFIED TYPE: ICD-10-CM

## 2024-10-28 PROCEDURE — S9088 SERVICES PROVIDED IN URGENT: HCPCS | Performed by: ORTHOPAEDIC SURGERY

## 2024-10-28 PROCEDURE — 99213 OFFICE O/P EST LOW 20 MIN: CPT | Performed by: ORTHOPAEDIC SURGERY

## 2024-10-28 RX ORDER — CEPHALEXIN 500 MG/1
500 CAPSULE ORAL EVERY 6 HOURS SCHEDULED
Qty: 28 CAPSULE | Refills: 0 | Status: SHIPPED | OUTPATIENT
Start: 2024-10-28 | End: 2024-11-05 | Stop reason: SDUPTHER
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
## 2024-10-28 NOTE — PATIENT INSTRUCTIONS
Take antibiotics for left lower extremity cellulitis  Compression socks, elevation for swelling control   Follow up with PCP in 3-5 days  Proceed to the ED immediately if symptoms worsen or if you develop any chest pain, shortness of breath, heart palpitations, dizziness, lightheadedness, headache.

## 2024-10-29 ENCOUNTER — DOCUMENTATION (OUTPATIENT)
Dept: ADMINISTRATIVE | Facility: OTHER | Age: 54
End: 2024-10-29

## 2024-10-29 NOTE — PROGRESS NOTES
Blood pressure elevated  Appointment department: [facility]  Appointment provider: Meghan Leiva PA-C  Blood pressure   10/28/24 1650 156/92   10/28/24 1625 151/90     10/29/24 11:43 AM    Patient was called after the Urgent Care visit ; there was no answer/ a message could not be left.    Thank you.  Jon Corona MA  [facility]              no

## 2024-11-05 ENCOUNTER — TELEMEDICINE (OUTPATIENT)
Dept: FAMILY MEDICINE CLINIC | Facility: CLINIC | Age: 54
End: 2024-11-05
Payer: COMMERCIAL

## 2024-11-05 VITALS — HEIGHT: 66 IN | RESPIRATION RATE: 20 BRPM | BODY MASS INDEX: 52.62 KG/M2

## 2024-11-05 DIAGNOSIS — L03.116 CELLULITIS OF LEFT LOWER LEG: Primary | ICD-10-CM

## 2024-11-05 PROCEDURE — G2211 COMPLEX E/M VISIT ADD ON: HCPCS | Performed by: NURSE PRACTITIONER

## 2024-11-05 PROCEDURE — 99213 OFFICE O/P EST LOW 20 MIN: CPT | Performed by: NURSE PRACTITIONER

## 2024-11-05 RX ORDER — CEPHALEXIN 500 MG/1
500 CAPSULE ORAL EVERY 6 HOURS SCHEDULED
Qty: 40 CAPSULE | Refills: 0 | Status: SHIPPED | OUTPATIENT
Start: 2024-11-05 | End: 2024-11-15

## 2024-11-05 NOTE — PROGRESS NOTES
"Virtual Regular Visit  Name: Layla Desai      : 1970      MRN: [id number]  Encounter Provider: DMITRY Alcocer  Encounter Date: 2024   Encounter department: [facility] PRIMARY CARE    Verification of patient location:    Patient is located at Home in the following state in which I hold an active license [place]    Assessment & Plan  Cellulitis of left lower leg    Orders:    cephalexin (KEFLEX) 500 mg capsule; Take 1 capsule (500 mg total) by mouth every 6 (six) hours for 10 days         Encounter provider DMITRY Alcocer    The patient was identified by name and date of birth. Layla Desai was informed that this is a telemedicine visit and that the visit is being conducted through the Epic Embedded platform. She agrees to proceed..   She acknowledged consent and understanding of privacy and security of the video platform. The patient has agreed to participate and understands they can discontinue the visit at any time.    Patient is aware this is a billable service.     History of Present Illness     Video visit to discuss her cellulitis. Is on day 7 of Keflex- she reports it is \"just starting to get better\" and typically needs 2 courses of Keflex.     Initially had fevers, fevers resolved after 2-3 days on antibiotics. Has had this many times, had numerous doppler studies that were negative for blood clot in the past.   She is requesting an extended course of Keflex.         History obtained from : patient  Review of Systems   Cardiovascular:  Positive for leg swelling.   Skin:  Positive for color change and rash.     Medical History Reviewed by provider this encounter:           Objective     Resp 20   Ht 5' 6\" (1.676 m)   BMI 52.62 kg/m²   Physical Exam  Vitals and nursing note reviewed.   Constitutional:       General: She is not in acute distress.     Appearance: She is well-developed. She is not diaphoretic.   Pulmonary:      Effort: Pulmonary effort is normal. No " respiratory distress.   Musculoskeletal:         General: Swelling (minimal swelling noted of LLE compared to RLE) present.   Skin:     Coloration: Skin is not pale.      Findings: Erythema (brownish/reddish discoloration of LLE, compared to RLE in video) present.   Neurological:      Mental Status: She is oriented to person, place, and time.   Psychiatric:         Mood and Affect: Mood normal.         Speech: Speech normal.         Behavior: Behavior normal.         Thought Content: Thought content normal.         Judgment: Judgment normal.         Visit Time  Total Visit Duration: 11

## 2024-11-14 ENCOUNTER — TELEPHONE (OUTPATIENT)
Age: 54
End: 2024-11-14

## 2024-11-14 DIAGNOSIS — F17.210 CIGARETTE NICOTINE DEPENDENCE WITHOUT COMPLICATION: Primary | ICD-10-CM

## 2024-11-14 NOTE — TELEPHONE ENCOUNTER
Patient called back. Advised request has been sent to provider. Please advise.    Layla: [phone]

## 2024-11-14 NOTE — TELEPHONE ENCOUNTER
Patient called to request a script for Nicotine patches. Patient states she has been smoking more than usual lately. She is requesting 21mg which have helped her in the past. She would like it sent to  [id number] - [place] - [address] [place] [address] [phone]

## 2024-11-15 RX ORDER — NICOTINE 21 MG/24HR
1 PATCH, TRANSDERMAL 24 HOURS TRANSDERMAL EVERY 24 HOURS
Qty: 28 PATCH | Refills: 0 | Status: SHIPPED | OUTPATIENT
Start: 2024-11-15

## 2024-11-15 NOTE — TELEPHONE ENCOUNTER
Patient returned call. I informed patient nicotine patches have been sent to [facility] in [place].     Patient expressed understanding.

## 2024-12-16 DIAGNOSIS — R10.10 PAIN OF UPPER ABDOMEN: ICD-10-CM

## 2024-12-16 RX ORDER — OMEPRAZOLE 40 MG/1
40 CAPSULE, DELAYED RELEASE ORAL DAILY
Qty: 90 CAPSULE | Refills: 1 | Status: SHIPPED | OUTPATIENT
Start: 2024-12-16

## 2024-12-16 NOTE — TELEPHONE ENCOUNTER
Reason for call:   [x] Refill   [] Prior Auth  [] Other:     Office:   [x] PCP/Provider - Luz Siegel   [] Specialty/Provider -     Medication: omeprazole    Dose/Frequency: 40 mg take daily    Quantity: 90    Pharmacy: [facility]     Does the patient have enough for 3 days?   [] Yes   [x] No - Send as HP to POD- pt is out of medication

## 2025-01-06 DIAGNOSIS — Z79.4 TYPE 2 DIABETES MELLITUS WITH HYPERGLYCEMIA, WITH LONG-TERM CURRENT USE OF INSULIN (HCC): ICD-10-CM

## 2025-01-06 DIAGNOSIS — E11.65 TYPE 2 DIABETES MELLITUS WITH HYPERGLYCEMIA, WITH LONG-TERM CURRENT USE OF INSULIN (HCC): ICD-10-CM

## 2025-01-06 NOTE — TELEPHONE ENCOUNTER
Patient called in to have the following medications refilled:     Insulin Disposable Pump (Omnipod 5 G6 Pods, Gen 5,) MISC     NovoLOG 100 UNIT/ML injection     Patient has changed pharmacy. Now uses [facility] in [place], [place]

## 2025-01-07 RX ORDER — INSULIN ASPART 100 [IU]/ML
INJECTION, SOLUTION INTRAVENOUS; SUBCUTANEOUS
Qty: 90 ML | Refills: 1 | Status: SHIPPED | OUTPATIENT
Start: 2025-01-07

## 2025-01-07 RX ORDER — INSULIN PMP CART,AUT,G6/7,CNTR
EACH SUBCUTANEOUS
Qty: 30 EACH | Refills: 5 | Status: SHIPPED | OUTPATIENT
Start: 2025-01-07

## 2025-01-07 NOTE — TELEPHONE ENCOUNTER
Patient called to request a refill for their NovoLog and OmniPods advised a refill was requested on 01/06/2025 and is pending approval. Patient verbalized understanding and is in agreement.

## 2025-01-26 DIAGNOSIS — E11.65 TYPE 2 DIABETES MELLITUS WITH HYPERGLYCEMIA, WITH LONG-TERM CURRENT USE OF INSULIN (HCC): ICD-10-CM

## 2025-01-26 DIAGNOSIS — Z79.4 TYPE 2 DIABETES MELLITUS WITH HYPERGLYCEMIA, WITH LONG-TERM CURRENT USE OF INSULIN (HCC): ICD-10-CM

## 2025-01-28 RX ORDER — EMPAGLIFLOZIN 25 MG/1
25 TABLET, FILM COATED ORAL EVERY MORNING
Qty: 30 TABLET | Refills: 0 | Status: SHIPPED | OUTPATIENT
Start: 2025-01-28

## 2025-02-05 ENCOUNTER — TELEPHONE (OUTPATIENT)
Age: 55
End: 2025-02-05

## 2025-02-06 NOTE — TELEPHONE ENCOUNTER
I called to inform Layla I can get her a sample, she informed me she picked one up yesterday .    colostrum

## 2025-02-11 ENCOUNTER — TELEPHONE (OUTPATIENT)
Dept: ENDOCRINOLOGY | Facility: CLINIC | Age: 55
End: 2025-02-11

## 2025-02-11 DIAGNOSIS — E11.9 TYPE 2 DIABETES MELLITUS WITHOUT COMPLICATION, WITHOUT LONG-TERM CURRENT USE OF INSULIN (HCC): ICD-10-CM

## 2025-02-11 RX ORDER — SYRINGE-NEEDLE,INSULIN,0.5 ML 27GX1/2"
SYRINGE, EMPTY DISPOSABLE MISCELLANEOUS 3 TIMES DAILY
Qty: 300 EACH | Refills: 1 | Status: SHIPPED | OUTPATIENT
Start: 2025-02-11

## 2025-02-11 RX ORDER — INSULIN DETEMIR 100 [IU]/ML
36 INJECTION, SOLUTION SUBCUTANEOUS
Qty: 12 ML | Refills: 1 | Status: SHIPPED | OUTPATIENT
Start: 2025-02-11

## 2025-02-11 NOTE — TELEPHONE ENCOUNTER
Can we please do a prior authorization for Omnipod 5 [id number] Pods Gen 5 , Patient is out and in need asap

## 2025-02-11 NOTE — TELEPHONE ENCOUNTER
PA for Omnipod 5 [id number] Pods Gen 5 SUBMITTED to [employer]    via    [x]Other website PROMPT [id number]  [x]PA sent as URGENT    All office notes, labs and other pertaining documents and studies sent. Clinical questions answered. Awaiting determination from insurance company.     Turnaround time for your insurance to make a decision on your Prior Authorization can take 7-21 business days.

## 2025-02-12 NOTE — TELEPHONE ENCOUNTER
PA for Omnipod 5 [id number] Pods Gen 5  APPROVED     Date(s) approved 2/11/25    Case #[id number]    Patient advised by          []RaySathart Message  [x]Phone call   []LMOM  []L/M to call office as no active Communication consent on file  []Unable to leave detailed message as VM not approved on Communication consent       Pharmacy advised by    [x]Fax  []Phone call    Approval letter scanned into Media Yes

## 2025-02-14 ENCOUNTER — TELEPHONE (OUTPATIENT)
Age: 55
End: 2025-02-14

## 2025-02-24 DIAGNOSIS — E11.65 TYPE 2 DIABETES MELLITUS WITH HYPERGLYCEMIA, WITH LONG-TERM CURRENT USE OF INSULIN (HCC): ICD-10-CM

## 2025-02-24 DIAGNOSIS — Z79.4 TYPE 2 DIABETES MELLITUS WITH HYPERGLYCEMIA, WITH LONG-TERM CURRENT USE OF INSULIN (HCC): ICD-10-CM

## 2025-02-25 RX ORDER — EMPAGLIFLOZIN 25 MG/1
25 TABLET, FILM COATED ORAL EVERY MORNING
Qty: 30 TABLET | Refills: 5 | Status: SHIPPED | OUTPATIENT
Start: 2025-02-25

## 2025-02-26 DIAGNOSIS — E11.65 TYPE 2 DIABETES MELLITUS WITH HYPERGLYCEMIA, WITH LONG-TERM CURRENT USE OF INSULIN (HCC): Primary | ICD-10-CM

## 2025-02-26 DIAGNOSIS — Z79.4 TYPE 2 DIABETES MELLITUS WITH HYPERGLYCEMIA, WITH LONG-TERM CURRENT USE OF INSULIN (HCC): Primary | ICD-10-CM

## 2025-02-26 RX ORDER — INSULIN GLARGINE 100 [IU]/ML
INJECTION, SOLUTION SUBCUTANEOUS
Qty: 30 ML | Refills: 1 | Status: SHIPPED | OUTPATIENT
Start: 2025-02-26

## 2025-03-17 DIAGNOSIS — J06.9 UPPER RESPIRATORY TRACT INFECTION, UNSPECIFIED TYPE: ICD-10-CM

## 2025-03-17 NOTE — TELEPHONE ENCOUNTER
Medication: fluticasone (FLONASE) 50 mcg/act nasal spray     Dose/Frequency:   1 spray into each nostril daily        Quantity: 1 Bottle     Pharmacy: [id number]  [place] [address]     Office:   [x] PCP/Provider - DMITRY Hammond   [] Speciality/Provider -     Does the patient have enough for 3 days?   [] Yes   [x] No - Send as HP to POD      Patient wanted to let the provider know her allergies are starting to act up. Please advise.

## 2025-03-18 RX ORDER — FLUTICASONE PROPIONATE 50 MCG
1 SPRAY, SUSPENSION (ML) NASAL DAILY
Qty: 16 G | Refills: 0 | Status: SHIPPED | OUTPATIENT
Start: 2025-03-18

## 2025-03-18 NOTE — TELEPHONE ENCOUNTER
Patient has not been seen since last July. She is diabetic and should be seen every 3-4 months. Please schedule an appointment.

## 2025-03-19 ENCOUNTER — VBI (OUTPATIENT)
Dept: ADMINISTRATIVE | Facility: OTHER | Age: 55
End: 2025-03-19

## 2025-03-19 NOTE — TELEPHONE ENCOUNTER
03/19/25 7:58 AM     Chart reviewed for Mammogram was/were not submitted to the patient's insurance.     Meagan Almonte MA   [facility]

## 2025-03-28 ENCOUNTER — TELEPHONE (OUTPATIENT)
Dept: FAMILY MEDICINE CLINIC | Facility: CLINIC | Age: 55
End: 2025-03-28

## 2025-03-28 NOTE — TELEPHONE ENCOUNTER
Called patient and left voicemail. She needs a longer appt time - overdue for AWV. Also will need to complete labs prior to appt.     Left ms for her to call our office back to discuss.

## 2025-04-01 ENCOUNTER — RA CDI HCC (OUTPATIENT)
Dept: OTHER | Facility: HOSPITAL | Age: 55
End: 2025-04-01

## 2025-04-01 PROBLEM — E66.01 CLASS 3 SEVERE OBESITY DUE TO EXCESS CALORIES WITH BODY MASS INDEX (BMI) OF 50.0 TO 59.9 IN ADULT (HCC): Status: ACTIVE | Noted: 2018-02-19

## 2025-04-01 PROBLEM — E66.813 CLASS 3 SEVERE OBESITY DUE TO EXCESS CALORIES WITH BODY MASS INDEX (BMI) OF 50.0 TO 59.9 IN ADULT: Status: ACTIVE | Noted: 2018-02-19

## 2025-04-01 PROBLEM — E66.01 CLASS 3 SEVERE OBESITY DUE TO EXCESS CALORIES WITH BODY MASS INDEX (BMI) OF 50.0 TO 59.9 IN ADULT (HCC): Status: ACTIVE | Noted: 2025-04-01

## 2025-04-01 PROBLEM — E66.813 CLASS 3 SEVERE OBESITY DUE TO EXCESS CALORIES WITH BODY MASS INDEX (BMI) OF 50.0 TO 59.9 IN ADULT: Status: ACTIVE | Noted: 2025-04-01

## 2025-04-01 NOTE — PROGRESS NOTES
HCC coding opportunities     Z68.43     Chart Reviewed number of suggestions sent to Provider: 1     Patients Insurance     Medicare Insurance: [employer]

## 2025-04-02 ENCOUNTER — APPOINTMENT (OUTPATIENT)
Dept: LAB | Facility: CLINIC | Age: 55
End: 2025-04-02
Payer: COMMERCIAL

## 2025-04-02 ENCOUNTER — OFFICE VISIT (OUTPATIENT)
Dept: FAMILY MEDICINE CLINIC | Facility: CLINIC | Age: 55
End: 2025-04-02
Payer: COMMERCIAL

## 2025-04-02 VITALS
SYSTOLIC BLOOD PRESSURE: 124 MMHG | HEART RATE: 74 BPM | OXYGEN SATURATION: 95 % | BODY MASS INDEX: 47.09 KG/M2 | TEMPERATURE: 97.8 F | HEIGHT: 66 IN | WEIGHT: 293 LBS | DIASTOLIC BLOOD PRESSURE: 74 MMHG

## 2025-04-02 DIAGNOSIS — R60.0 EDEMA OF BOTH LEGS: ICD-10-CM

## 2025-04-02 DIAGNOSIS — E78.00 HYPERCHOLESTEROLEMIA: ICD-10-CM

## 2025-04-02 DIAGNOSIS — E66.01 MORBID OBESITY (HCC): ICD-10-CM

## 2025-04-02 DIAGNOSIS — J45.30 MILD PERSISTENT ASTHMA WITHOUT COMPLICATION: Chronic | ICD-10-CM

## 2025-04-02 DIAGNOSIS — E78.00 HYPERCHOLESTEROLEMIA: Primary | ICD-10-CM

## 2025-04-02 DIAGNOSIS — Z12.31 ENCOUNTER FOR SCREENING MAMMOGRAM FOR BREAST CANCER: ICD-10-CM

## 2025-04-02 DIAGNOSIS — I10 ESSENTIAL HYPERTENSION: ICD-10-CM

## 2025-04-02 DIAGNOSIS — Z79.4 TYPE 2 DIABETES MELLITUS WITH HYPERGLYCEMIA, WITH LONG-TERM CURRENT USE OF INSULIN (HCC): Primary | ICD-10-CM

## 2025-04-02 DIAGNOSIS — Z79.4 TYPE 2 DIABETES MELLITUS WITH HYPERGLYCEMIA, WITH LONG-TERM CURRENT USE OF INSULIN (HCC): ICD-10-CM

## 2025-04-02 DIAGNOSIS — E11.65 TYPE 2 DIABETES MELLITUS WITH HYPERGLYCEMIA, WITH LONG-TERM CURRENT USE OF INSULIN (HCC): Primary | ICD-10-CM

## 2025-04-02 DIAGNOSIS — E11.65 TYPE 2 DIABETES MELLITUS WITH HYPERGLYCEMIA, WITH LONG-TERM CURRENT USE OF INSULIN (HCC): ICD-10-CM

## 2025-04-02 DIAGNOSIS — L03.119 CELLULITIS OF LOWER EXTREMITY, UNSPECIFIED LATERALITY: ICD-10-CM

## 2025-04-02 PROBLEM — C69.42: Status: RESOLVED | Noted: 2019-04-02 | Resolved: 2025-04-02

## 2025-04-02 PROBLEM — C69.42: Status: RESOLVED | Noted: 2020-08-25 | Resolved: 2025-04-02

## 2025-04-02 LAB
BASOPHILS # BLD AUTO: 0.03 THOUSANDS/ÂΜL (ref 0–0.1)
BASOPHILS NFR BLD AUTO: 1 % (ref 0–1)
EOSINOPHIL # BLD AUTO: 0.19 THOUSAND/ÂΜL (ref 0–0.61)
EOSINOPHIL NFR BLD AUTO: 3 % (ref 0–6)
ERYTHROCYTE [DISTWIDTH] IN BLOOD BY AUTOMATED COUNT: 13.8 % (ref 11.6–15.1)
HCT VFR BLD AUTO: 46.6 % (ref 34.8–46.1)
HGB BLD-MCNC: 14.8 G/DL (ref 11.5–15.4)
IMM GRANULOCYTES # BLD AUTO: 0.03 THOUSAND/UL (ref 0–0.2)
IMM GRANULOCYTES NFR BLD AUTO: 1 % (ref 0–2)
LYMPHOCYTES # BLD AUTO: 1.53 THOUSANDS/ÂΜL (ref 0.6–4.47)
LYMPHOCYTES NFR BLD AUTO: 23 % (ref 14–44)
MCH RBC QN AUTO: 28.5 PG (ref 26.8–34.3)
MCHC RBC AUTO-ENTMCNC: 31.8 G/DL (ref 31.4–37.4)
MCV RBC AUTO: 90 FL (ref 82–98)
MONOCYTES # BLD AUTO: 0.5 THOUSAND/ÂΜL (ref 0.17–1.22)
MONOCYTES NFR BLD AUTO: 8 % (ref 4–12)
NEUTROPHILS # BLD AUTO: 4.36 THOUSANDS/ÂΜL (ref 1.85–7.62)
NEUTS SEG NFR BLD AUTO: 64 % (ref 43–75)
NRBC BLD AUTO-RTO: 0 /100 WBCS
PLATELET # BLD AUTO: 230 THOUSANDS/UL (ref 149–390)
PMV BLD AUTO: 9.9 FL (ref 8.9–12.7)
RBC # BLD AUTO: 5.19 MILLION/UL (ref 3.81–5.12)
WBC # BLD AUTO: 6.64 THOUSAND/UL (ref 4.31–10.16)

## 2025-04-02 PROCEDURE — 82043 UR ALBUMIN QUANTITATIVE: CPT

## 2025-04-02 PROCEDURE — 83880 ASSAY OF NATRIURETIC PEPTIDE: CPT

## 2025-04-02 PROCEDURE — 82570 ASSAY OF URINE CREATININE: CPT

## 2025-04-02 PROCEDURE — 85025 COMPLETE CBC W/AUTO DIFF WBC: CPT

## 2025-04-02 PROCEDURE — 83036 HEMOGLOBIN GLYCOSYLATED A1C: CPT

## 2025-04-02 PROCEDURE — G2211 COMPLEX E/M VISIT ADD ON: HCPCS | Performed by: NURSE PRACTITIONER

## 2025-04-02 PROCEDURE — 36415 COLL VENOUS BLD VENIPUNCTURE: CPT

## 2025-04-02 PROCEDURE — 80053 COMPREHEN METABOLIC PANEL: CPT

## 2025-04-02 PROCEDURE — 99214 OFFICE O/P EST MOD 30 MIN: CPT | Performed by: NURSE PRACTITIONER

## 2025-04-02 PROCEDURE — 80061 LIPID PANEL: CPT

## 2025-04-02 RX ORDER — CEPHALEXIN 500 MG/1
500 CAPSULE ORAL 4 TIMES DAILY
Qty: 40 CAPSULE | Refills: 0 | Status: SHIPPED | OUTPATIENT
Start: 2025-04-02 | End: 2025-04-12

## 2025-04-02 RX ORDER — FUROSEMIDE 20 MG/1
20 TABLET ORAL DAILY PRN
Qty: 30 TABLET | Refills: 0 | Status: SHIPPED | OUTPATIENT
Start: 2025-04-02

## 2025-04-02 RX ORDER — ALBUTEROL SULFATE 90 UG/1
2 INHALANT RESPIRATORY (INHALATION) EVERY 6 HOURS PRN
Qty: 18 G | Refills: 5 | Status: SHIPPED | OUTPATIENT
Start: 2025-04-02

## 2025-04-02 NOTE — PROGRESS NOTES
Name: Layla Desai      : 1970      MRN: [id number]  Encounter Provider: DMITRY Hammond  Encounter Date: 2025   Encounter department: [facility] PRIMARY CARE  :  Assessment & Plan  Type 2 diabetes mellitus with hyperglycemia, with long-term current use of insulin (HCC)    Lab Results   Component Value Date    HGBA1C 7.7 (H) 2025       Orders:  •  Albumin / creatinine urine ratio; Future  •  Hemoglobin A1C; Future    Encounter for screening mammogram for breast cancer    Orders:  •  Mammo screening bilateral w 3d and cad; Future    Edema of both legs    Orders:  •  B-Type Natriuretic Peptide(BNP); Future  •  furosemide (LASIX) 20 mg tablet; Take 1 tablet (20 mg total) by mouth daily as needed (leg  swelling.)    Cellulitis of lower extremity, unspecified laterality    Orders:  •  cephalexin (KEFLEX) 500 mg capsule; Take 1 capsule (500 mg total) by mouth 4 times a day for 10 days    Morbid obesity (HCC)      Orders:  •  Hemoglobin A1C; Future    Essential hypertension    Orders:  •  Comprehensive metabolic panel; Future    Hypercholesterolemia    Orders:  •  CBC and differential; Future    Mild persistent asthma without complication    Orders:  •  albuterol (PROVENTIL HFA,VENTOLIN HFA) 90 mcg/act inhaler; Inhale 2 puffs every 6 (six) hours as needed for wheezing          Depression Screening and Follow-up Plan: Patient was screened for depression during today's encounter. They screened negative with a PHQ-2 score of 0.        History of Present Illness   Patient here for follow up visit. Has been non complaint with appointments and getting labs done.  Has been taking medication.  Fasting glucose in the mornings. Currently glucose is 143, wear a dexcom.  Has been trying to follow a diabetic diet. Reports  swelling in b/l LE worse in the left leg. History of cellulitis. No episodes in the last 1-2 years.       Review of Systems   Constitutional:  Negative for fatigue and fever.  "  Respiratory:  Negative for apnea, chest tightness, shortness of breath and wheezing.    Cardiovascular:  Positive for leg swelling. Negative for chest pain.   Skin:  Positive for color change (b/l LE mild redness). Negative for rash.   Neurological:  Negative for dizziness, light-headedness and headaches.       Objective   /74   Pulse 74   Temp 97.8 °F (36.6 °C)   Ht 5' 6\" (1.676 m)   Wt (!) 148 kg (327 lb)   SpO2 95%   BMI 52.78 kg/m²      Physical Exam  Vitals and nursing note reviewed.   Constitutional:       General: She is not in acute distress.     Appearance: Normal appearance. She is well-developed. She is not diaphoretic.   HENT:      Head: Normocephalic and atraumatic.   Cardiovascular:      Pulses: no weak pulses.           Dorsalis pedis pulses are 2+ on the right side and 2+ on the left side.   Pulmonary:      Effort: Pulmonary effort is normal. No tachypnea or respiratory distress.      Breath sounds: Normal breath sounds.   Feet:      Right foot:      Skin integrity: No ulcer, skin breakdown, erythema, warmth, callus or dry skin.      Left foot:      Skin integrity: No ulcer, skin breakdown, erythema, warmth, callus or dry skin.   Skin:     General: Skin is warm.   Neurological:      Mental Status: She is alert and oriented to person, place, and time.   Psychiatric:         Speech: Speech normal.         Behavior: Behavior normal. Behavior is cooperative.         Thought Content: Thought content normal.         Judgment: Judgment normal.     Diabetic Foot Exam    Patient's shoes and socks removed.    Right Foot/Ankle   Right Foot Inspection  Skin Exam: skin normal and skin intact. No dry skin, no warmth, no callus, no erythema, no maceration, no abnormal color, no pre-ulcer, no ulcer and no callus.     Toe Exam: ROM and strength within normal limits, swelling and erythema.     Sensory   Monofilament testing: intact    Vascular  Capillary refills: < 3 seconds  The right DP pulse is 2+. "     Left Foot/Ankle  Left Foot Inspection  Skin Exam: skin normal and skin intact. No dry skin, no warmth, no erythema, no maceration, normal color, no pre-ulcer, no ulcer and no callus.     Toe Exam: ROM and strength within normal limits, swelling and erythema.     Sensory   Monofilament testing: intact    Vascular  Capillary refills: < 3 seconds  The left DP pulse is 2+.     Assign Risk Category  No deformity present  No loss of protective sensation  No weak pulses  Risk: 0

## 2025-04-02 NOTE — ASSESSMENT & PLAN NOTE
Lab Results   Component Value Date    HGBA1C 7.7 (H) 04/02/2025       Orders:  •  Albumin / creatinine urine ratio; Future  •  Hemoglobin A1C; Future

## 2025-04-03 ENCOUNTER — RESULTS FOLLOW-UP (OUTPATIENT)
Dept: ENDOCRINOLOGY | Facility: CLINIC | Age: 55
End: 2025-04-03

## 2025-04-03 LAB
ALBUMIN SERPL BCG-MCNC: 4.1 G/DL (ref 3.5–5)
ALP SERPL-CCNC: 66 U/L (ref 34–104)
ALT SERPL W P-5'-P-CCNC: 18 U/L (ref 7–52)
ANION GAP SERPL CALCULATED.3IONS-SCNC: 8 MMOL/L (ref 4–13)
AST SERPL W P-5'-P-CCNC: 15 U/L (ref 13–39)
BILIRUB SERPL-MCNC: 0.53 MG/DL (ref 0.2–1)
BNP SERPL-MCNC: 105 PG/ML (ref 0–100)
BUN SERPL-MCNC: 12 MG/DL (ref 5–25)
CALCIUM SERPL-MCNC: 8.9 MG/DL (ref 8.4–10.2)
CHLORIDE SERPL-SCNC: 107 MMOL/L (ref 96–108)
CHOLEST SERPL-MCNC: 166 MG/DL (ref ?–200)
CO2 SERPL-SCNC: 24 MMOL/L (ref 21–32)
CREAT SERPL-MCNC: 0.93 MG/DL (ref 0.6–1.3)
CREAT UR-MCNC: 66.1 MG/DL
EST. AVERAGE GLUCOSE BLD GHB EST-MCNC: 174 MG/DL
GFR SERPL CREATININE-BSD FRML MDRD: 70 ML/MIN/1.73SQ M
GLUCOSE P FAST SERPL-MCNC: 123 MG/DL (ref 65–99)
HBA1C MFR BLD: 7.7 %
HDLC SERPL-MCNC: 44 MG/DL
LDLC SERPL CALC-MCNC: 97 MG/DL (ref 0–100)
MICROALBUMIN UR-MCNC: 14.2 MG/L
MICROALBUMIN/CREAT 24H UR: 21 MG/G CREATININE (ref 0–30)
NONHDLC SERPL-MCNC: 122 MG/DL
POTASSIUM SERPL-SCNC: 4.5 MMOL/L (ref 3.5–5.3)
PROT SERPL-MCNC: 6.6 G/DL (ref 6.4–8.4)
SODIUM SERPL-SCNC: 139 MMOL/L (ref 135–147)
TRIGL SERPL-MCNC: 123 MG/DL (ref ?–150)

## 2025-04-11 ENCOUNTER — HOSPITAL ENCOUNTER (OUTPATIENT)
Dept: MAMMOGRAPHY | Facility: HOSPITAL | Age: 55
End: 2025-04-11
Payer: COMMERCIAL

## 2025-04-11 DIAGNOSIS — Z12.31 ENCOUNTER FOR SCREENING MAMMOGRAM FOR BREAST CANCER: ICD-10-CM

## 2025-04-11 PROCEDURE — 77067 SCR MAMMO BI INCL CAD: CPT

## 2025-04-11 PROCEDURE — 77063 BREAST TOMOSYNTHESIS BI: CPT

## 2025-04-15 ENCOUNTER — OFFICE VISIT (OUTPATIENT)
Dept: FAMILY MEDICINE CLINIC | Facility: CLINIC | Age: 55
End: 2025-04-15
Payer: COMMERCIAL

## 2025-04-15 VITALS
OXYGEN SATURATION: 93 % | TEMPERATURE: 98.3 F | BODY MASS INDEX: 47.09 KG/M2 | SYSTOLIC BLOOD PRESSURE: 122 MMHG | WEIGHT: 293 LBS | HEIGHT: 66 IN | DIASTOLIC BLOOD PRESSURE: 80 MMHG | HEART RATE: 73 BPM

## 2025-04-15 DIAGNOSIS — C69.42 MALIGNANT MELANOMA OF UVEA OF LEFT EYE (HCC): Primary | ICD-10-CM

## 2025-04-15 DIAGNOSIS — R16.0 HEPATOMEGALY: ICD-10-CM

## 2025-04-15 DIAGNOSIS — R16.1 SPLENOMEGALY: ICD-10-CM

## 2025-04-15 DIAGNOSIS — Z79.4 TYPE 2 DIABETES MELLITUS WITH HYPERGLYCEMIA, WITH LONG-TERM CURRENT USE OF INSULIN (HCC): ICD-10-CM

## 2025-04-15 DIAGNOSIS — E11.65 TYPE 2 DIABETES MELLITUS WITH HYPERGLYCEMIA, WITH LONG-TERM CURRENT USE OF INSULIN (HCC): ICD-10-CM

## 2025-04-15 PROCEDURE — G0439 PPPS, SUBSEQ VISIT: HCPCS | Performed by: NURSE PRACTITIONER

## 2025-04-15 PROCEDURE — G0444 DEPRESSION SCREEN ANNUAL: HCPCS | Performed by: NURSE PRACTITIONER

## 2025-04-15 RX ORDER — ESCITALOPRAM OXALATE 10 MG/1
1 TABLET ORAL DAILY
COMMUNITY
Start: 2025-03-20

## 2025-04-15 RX ORDER — TRAZODONE HYDROCHLORIDE 100 MG/1
100-200 TABLET ORAL DAILY
COMMUNITY
Start: 2025-03-21

## 2025-04-15 NOTE — PATIENT INSTRUCTIONS
Medicare Preventive Visit Patient Instructions  Thank you for completing your Welcome to Medicare Visit or Medicare Annual Wellness Visit today. Your next wellness visit will be due in one year (4/16/2026).  The screening/preventive services that you may require over the next 5-10 years are detailed below. Some tests may not apply to you based off risk factors and/or age. Screening tests ordered at today's visit but not completed yet may show as past due. Also, please note that scanned in results may not display below.  Preventive Screenings:  Service Recommendations Previous Testing/Comments   Colorectal Cancer Screening  * Colonoscopy    * Fecal Occult Blood Test (FOBT)/Fecal Immunochemical Test (FIT)  * Fecal DNA/Cologuard Test  * Flexible Sigmoidoscopy Age: 45-75 years old   Colonoscopy: every 10 years (may be performed more frequently if at higher risk)  OR  FOBT/FIT: every 1 year  OR  Cologuard: every 3 years  OR  Sigmoidoscopy: every 5 years  Screening may be recommended earlier than age 45 if at higher risk for colorectal cancer. Also, an individualized decision between you and your healthcare provider will decide whether screening between the ages of 76-85 would be appropriate. Colonoscopy: 03/04/2024  FOBT/FIT: Not on file  Cologuard: Not on file  Sigmoidoscopy: Not on file    Screening Current     Breast Cancer Screening Age: 40+ years old  Frequency: every 1-2 years  Not required if history of left and right mastectomy Mammogram: 04/11/2025    Screening Current   Cervical Cancer Screening Between the ages of 21-29, pap smear recommended once every 3 years.   Between the ages of 30-65, can perform pap smear with HPV co-testing every 5 years.   Recommendations may differ for women with a history of total hysterectomy, cervical cancer, or abnormal pap smears in past. Pap Smear: 05/10/2022    Screening Current   Hepatitis C Screening Once for adults born between 1945 and 1965  More frequently in patients at  high risk for Hepatitis C Hep C Antibody: Not on file    Screening Not Indicated   Diabetes Screening 1-2 times per year if you're at risk for diabetes or have pre-diabetes Fasting glucose: 123 mg/dL (4/2/2025)  A1C: 7.7 % (4/2/2025)  Screening Not Indicated  History Diabetes  Screening Current   Cholesterol Screening Once every 5 years if you don't have a lipid disorder. May order more often based on risk factors. Lipid panel: 04/02/2025    History Lipid Disorder  Screening Current     Other Preventive Screenings Covered by Medicare:  Abdominal Aortic Aneurysm (AAA) Screening: covered once if your at risk. You're considered to be at risk if you have a family history of AAA.  Lung Cancer Screening: covers low dose CT scan once per year if you meet all of the following conditions: (1) Age 55-77; (2) No signs or symptoms of lung cancer; (3) Current smoker or have quit smoking within the last 15 years; (4) You have a tobacco smoking history of at least 20 pack years (packs per day multiplied by number of years you smoked); (5) You get a written order from a healthcare provider.  Glaucoma Screening: covered annually if you're considered high risk: (1) You have diabetes OR (2) Family history of glaucoma OR (3)  aged 50 and older OR (4)  American aged 65 and older  Osteoporosis Screening: covered every 2 years if you meet one of the following conditions: (1) You're estrogen deficient and at risk for osteoporosis based off medical history and other findings; (2) Have a vertebral abnormality; (3) On glucocorticoid therapy for more than 3 months; (4) Have primary hyperparathyroidism; (5) On osteoporosis medications and need to assess response to drug therapy.   Last bone density test (DXA Scan): Not on file.  HIV Screening: covered annually if you're between the age of 15-65. Also covered annually if you are younger than 15 and older than 65 with risk factors for HIV infection. For pregnant patients,  it is covered up to 3 times per pregnancy.    Immunizations:  Immunization Recommendations   Influenza Vaccine Annual influenza vaccination during flu season is recommended for all persons aged >= 6 months who do not have contraindications   Pneumococcal Vaccine   * Pneumococcal conjugate vaccine = PCV13 (Prevnar 13), PCV15 (Vaxneuvance), PCV20 (Prevnar 20)  * Pneumococcal polysaccharide vaccine = PPSV23 (Pneumovax) Adults 19-65 yo with certain risk factors or if 65+ yo  If never received any pneumonia vaccine: recommend Prevnar 20 (PCV20)  Give PCV20 if previously received 1 dose of PCV13 or PPSV23   Hepatitis B Vaccine 3 dose series if at intermediate or high risk (ex: diabetes, end stage renal disease, liver disease)   Respiratory syncytial virus (RSV) Vaccine - COVERED BY MEDICARE PART D  * RSVPreF3 (Arexvy) CDC recommends that adults 60 years of age and older may receive a single dose of RSV vaccine using shared clinical decision-making (SCDM)   Tetanus (Td) Vaccine - COST NOT COVERED BY MEDICARE PART B Following completion of primary series, a booster dose should be given every 10 years to maintain immunity against tetanus. Td may also be given as tetanus wound prophylaxis.   Tdap Vaccine - COST NOT COVERED BY MEDICARE PART B Recommended at least once for all adults. For pregnant patients, recommended with each pregnancy.   Shingles Vaccine (Shingrix) - COST NOT COVERED BY MEDICARE PART B  2 shot series recommended in those 19 years and older who have or will have weakened immune systems or those 50 years and older     Health Maintenance Due:      Topic Date Due   • Hepatitis C Screening  Never done   • HIV Screening  Never done   • Lung Cancer Screening  Never done   • Breast Cancer Screening: Mammogram  12/07/2023   • Colorectal Cancer Screening  03/04/2025   • Cervical Cancer Screening  05/10/2027     Immunizations Due:      Topic Date Due   • Pneumococcal Vaccine: Pediatrics (0 to 5 Years) and At-Risk  Patients (6 to 64 Years) (2 of 2 - PCV) 11/16/2022   • Influenza Vaccine (1) 09/01/2024   • COVID-19 Vaccine (3 - 2024-25 season) 09/01/2024     Advance Directives   What are advance directives?  Advance directives are legal documents that state your wishes and plans for medical care. These plans are made ahead of time in case you lose your ability to make decisions for yourself. Advance directives can apply to any medical decision, such as the treatments you want, and if you want to donate organs.   What are the types of advance directives?  There are many types of advance directives, and each state has rules about how to use them. You may choose a combination of any of the following:  Living will:  This is a written record of the treatment you want. You can also choose which treatments you do not want, which to limit, and which to stop at a certain time. This includes surgery, medicine, IV fluid, and tube feedings.   Durable power of  for healthcare (DPAHC):  This is a written record that states who you want to make healthcare choices for you when you are unable to make them for yourself. This person, called a proxy, is usually a family member or a friend. You may choose more than 1 proxy.  Do not resuscitate (DNR) order:  A DNR order is used in case your heart stops beating or you stop breathing. It is a request not to have certain forms of treatment, such as CPR. A DNR order may be included in other types of advance directives.  Medical directive:  This covers the care that you want if you are in a coma, near death, or unable to make decisions for yourself. You can list the treatments you want for each condition. Treatment may include pain medicine, surgery, blood transfusions, dialysis, IV or tube feedings, and a ventilator (breathing machine).  Values history:  This document has questions about your views, beliefs, and how you feel and think about life. This information can help others choose the care  that you would choose.  Why are advance directives important?  An advance directive helps you control your care. Although spoken wishes may be used, it is better to have your wishes written down. Spoken wishes can be misunderstood, or not followed. Treatments may be given even if you do not want them. An advance directive may make it easier for your family to make difficult choices about your care.   Cigarette Smoking and Your Health   Risks to your health if you smoke:  Nicotine and other chemicals found in tobacco damage every cell in your body. Even if you are a light smoker, you have an increased risk for cancer, heart disease, and lung disease. If you are pregnant or have diabetes, smoking increases your risk for complications.   Benefits to your health if you stop smoking:   You decrease respiratory symptoms such as coughing, wheezing, and shortness of breath.   You reduce your risk for cancers of the lung, mouth, throat, kidney, bladder, pancreas, stomach, and cervix. If you already have cancer, you increase the benefits of chemotherapy. You also reduce your risk for cancer returning or a second cancer from developing.   You reduce your risk for heart disease, blood clots, heart attack, and stroke.   You reduce your risk for lung infections, and diseases such as pneumonia, asthma, chronic bronchitis, and emphysema.  Your circulation improves. More oxygen can be delivered to your body. If you have diabetes, you lower your risk for complications, such as kidney, artery, and eye diseases. You also lower your risk for nerve damage. Nerve damage can lead to amputations, poor vision, and blindness.  You improve your body's ability to heal and to fight infections.  For more information and support to stop smoking:   [employer]  Phone: 7- 988 - 206-9173  Web Address: www.[employer]  Weight Management   Why it is important to manage your weight:  Being overweight increases your risk of health conditions such as  heart disease, high blood pressure, type 2 diabetes, and certain types of cancer. It can also increase your risk for osteoarthritis, sleep apnea, and other respiratory problems. Aim for a slow, steady weight loss. Even a small amount of weight loss can lower your risk of health problems.  How to lose weight safely:  A safe and healthy way to lose weight is to eat fewer calories and get regular exercise. You can lose up about 1 pound a week by decreasing the number of calories you eat by 500 calories each day.   Healthy meal plan for weight management:  A healthy meal plan includes a variety of foods, contains fewer calories, and helps you stay healthy. A healthy meal plan includes the following:  Eat whole-grain foods more often.  A healthy meal plan should contain fiber. Fiber is the part of grains, fruits, and vegetables that is not broken down by your body. Whole-grain foods are healthy and provide extra fiber in your diet. Some examples of whole-grain foods are whole-wheat breads and pastas, oatmeal, brown rice, and bulgur.  Eat a variety of vegetables every day.  Include dark, leafy greens such as spinach, kale, xochitl greens, and mustard greens. Eat yellow and orange vegetables such as carrots, sweet potatoes, and winter squash.   Eat a variety of fruits every day.  Choose fresh or canned fruit (canned in its own juice or light syrup) instead of juice. Fruit juice has very little or no fiber.  Eat low-fat dairy foods.  Drink fat-free (skim) milk or 1% milk. Eat fat-free yogurt and low-fat cottage cheese. Try low-fat cheeses such as mozzarella and other reduced-fat cheeses.  Choose meat and other protein foods that are low in fat.  Choose beans or other legumes such as split peas or lentils. Choose fish, skinless poultry (chicken or turkey), or lean cuts of red meat (beef or pork). Before you cook meat or poultry, cut off any visible fat.   Use less fat and oil.  Try baking foods instead of frying them. Add  less fat, such as margarine, sour cream, regular salad dressing and mayonnaise to foods. Eat fewer high-fat foods. Some examples of high-fat foods include french fries, doughnuts, ice cream, and cakes.  Eat fewer sweets.  Limit foods and drinks that are high in sugar. This includes candy, cookies, regular soda, and sweetened drinks.  Exercise:  Exercise at least 30 minutes per day on most days of the week. Some examples of exercise include walking, biking, dancing, and swimming. You can also fit in more physical activity by taking the stairs instead of the elevator or parking farther away from stores. Ask your healthcare provider about the best exercise plan for you.      © Copyright [employer] 2018 Information is for End User's use only and may not be sold, redistributed or otherwise used for commercial purposes. All illustrations and images included in CareNotes® are the copyrighted property of [employer].A[employer]M., Inc. or [employer]

## 2025-04-15 NOTE — PROGRESS NOTES
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 Personalized health advice and appropriate referrals for health education or preventive services given if needed, as noted in patient's After Visit Summary.    History of Present Illness     HPI   Patient Care Team:  DMITRY Hammond as PCP - General (Family Medicine)  Ariella Bean MD as PCP - PCP-[facility] (RTE)  DMITRY Hunter as PCP - Endocrinology (Endocrinology)  DMITRY Hunter as Nurse Practitioner (Endocrinology)  Ophelia Nur RD as Diabetes Educator (Dietician)    Review of Systems   Constitutional: Negative.  Negative for fatigue and fever.   Respiratory: Negative.  Negative for shortness of breath and wheezing.    Cardiovascular: Negative.  Negative for palpitations.   Skin: Negative.  Negative for rash.   Neurological: Negative.  Negative for dizziness, light-headedness and headaches.   Psychiatric/Behavioral: Negative.  The patient is not nervous/anxious.      Medical History Reviewed by provider this encounter:  Meds       Annual Wellness Visit Questionnaire   Layla is here for her Subsequent Wellness visit. Last Medicare Wellness visit information reviewed, patient interviewed, no change since last AWV.     Health Risk Assessment:   Patient rates overall  health as good. Patient feels that their physical health rating is same. Patient is satisfied with their life. Eyesight was rated as same. Hearing was rated as same. Patient feels that their emotional and mental health rating is same. Patients states they are never, rarely angry. Patient states they are sometimes unusually tired/fatigued. Pain experienced in the last 7 days has been some. Patient's pain rating has been 3/10. Patient states that she has experienced no weight loss or gain in last 6 months.     Depression Screening:   PHQ-2 Score: 0      Fall Risk Screening:   In the past year, patient has experienced: no history of falling in past year      Urinary Incontinence Screening:   Patient has not leaked urine accidently in the last six months.     Home Safety:  Patient does not have trouble with stairs inside or outside of their home. Patient has working smoke alarms and has working carbon monoxide detector. Home safety hazards include: none.     Nutrition:   Current diet is Regular and No Added Salt.     Medications:   Patient is currently taking over-the-counter supplements. OTC medications include: see medication list. Patient is able to manage medications.     Activities of Daily Living (ADLs)/Instrumental Activities of Daily Living (IADLs):   Walk and transfer into and out of bed and chair?: Yes  Dress and groom yourself?: Yes    Bathe or shower yourself?: Yes    Feed yourself? Yes  Do your laundry/housekeeping?: Yes  Manage your money, pay your bills and track your expenses?: Yes  Make your own meals?: Yes    Do your own shopping?: Yes    Previous Hospitalizations:   Any hospitalizations or ED visits within the last 12 months?: No      Advance Care Planning:   Living will: No    Durable POA for healthcare: No    Advanced directive: No    End of Life Decisions reviewed with patient: No    Provider agrees with end of life decisions: No      Cognitive Screening:   Provider or family/friend/caregiver  concerned regarding cognition?: No    Preventive Screenings      Cardiovascular Screening:    General: History Lipid Disorder and Screening Current      Diabetes Screening:     General: Screening Not Indicated, History Diabetes and Screening Current      Colorectal Cancer Screening:     General: Screening Current      Breast Cancer Screening:     General: Screening Current      Cervical Cancer Screening:    General: Screening Current      Osteoporosis Screening:    General: Screening Not Indicated      Abdominal Aortic Aneurysm (AAA) Screening:        General: Screening Not Indicated      Lung Cancer Screening:     General: Screening Not Indicated      Hepatitis C Screening:    General: Screening Not Indicated    Immunizations:  - Immunizations due: Influenza and Prevnar 20    Screening, Brief Intervention, and Referral to Treatment (SBIRT)     Screening  Typical number of drinks in a day: 0  Typical number of drinks in a week: 0  Interpretation: Low risk drinking behavior.    Single Item Drug Screening:  How often have you used an illegal drug (including marijuana) or a prescription medication for non-medical reasons in the past year? never    Single Item Drug Screen Score: 0  Interpretation: Negative screen for possible drug use disorder    Brief Intervention  Alcohol & drug use screenings were reviewed. No concerns regarding substance use disorder identified.     Annual Depression Screening  Time spent screening and evaluating the patient for depression during today's encounter was 5 minutes.    Other Counseling Topics:   Car/seat belt/driving safety and regular weightbearing exercise and calcium and vitamin D intake.     Social Drivers of Health     Financial Resource Strain: Low Risk  (1/23/2024)    Overall Financial Resource Strain (CARDIA)    • Difficulty of Paying Living Expenses: Not hard at all   Food Insecurity: No Food Insecurity (4/15/2025)    Hunger Vital Sign    • Worried About Running Out of Food in  "the Last Year: Never true    • Ran Out of Food in the Last Year: Never true   Transportation Needs: No Transportation Needs (4/15/2025)    PRAPARE - Transportation    • Lack of Transportation (Medical): No    • Lack of Transportation (Non-Medical): No   Housing Stability: Low Risk  (4/15/2025)    Housing Stability Vital Sign    • Unable to Pay for Housing in the Last Year: No    • Number of Times Moved in the Last Year: 1    • Homeless in the Last Year: No   Utilities: Not At Risk (4/15/2025)    [facility] Utilities    • Threatened with loss of utilities: No     No results found.    Objective   /80   Pulse 73   Temp 98.3 °F (36.8 °C)   Ht 5' 6\" (1.676 m)   Wt (!) 147 kg (323 lb 12.8 oz)   SpO2 93%   BMI 52.26 kg/m²     Physical Exam  Vitals and nursing note reviewed.   Constitutional:       General: She is not in acute distress.     Appearance: Normal appearance. She is well-developed. She is not diaphoretic.   HENT:      Head: Normocephalic and atraumatic.   Pulmonary:      Effort: Pulmonary effort is normal. No tachypnea or respiratory distress.      Breath sounds: Normal breath sounds.   Neurological:      Mental Status: She is alert and oriented to person, place, and time.   Psychiatric:         Speech: Speech normal.         Behavior: Behavior normal. Behavior is cooperative.         Thought Content: Thought content normal.         Judgment: Judgment normal.         "

## 2025-04-16 ENCOUNTER — TELEPHONE (OUTPATIENT)
Dept: ADMINISTRATIVE | Facility: OTHER | Age: 55
End: 2025-04-16

## 2025-04-16 NOTE — TELEPHONE ENCOUNTER
----- Message from Celestina CRUZ sent at 4/15/2025  1:17 PM EDT -----  Regarding: Care Gap Request  04/15/25 1:17 PM    Hello, our patient attached above has had Diabetic Eye Exam completed/performed. Please assist in updating the patient chart by making an External outreach to [facility] facility located in [place].     Thank you,  Celestina Cardenas  [facility] PRIMARY CARE

## 2025-04-16 NOTE — TELEPHONE ENCOUNTER
Upon review of the In Basket request we have found/obtained the documentation. After careful review of the document we are unable to complete this request for Diabetic Eye Exam because the documentation does not have the result(s) needed to close the requested care gap(s).    Any additional questions or concerns should be emailed to the Practice Liaisons via the appropriate education email address, please do not reply via In Basket.    Thank you  Mee Magallon MA   [facility]

## 2025-04-16 NOTE — LETTER
Diabetic Eye Exam Form    Date Requested: 25  Patient: Layla Desai  Patient : 1970   Referring Provider: DMITRY Hammond      DIABETIC Eye Exam Date _______________________________      Type of Exam MUST be documented for Diabetic Eye Exams. Please CHECK ONE.     Retinal Exam       Dilated Retinal Exam       OCT       Optomap-Iris Exam      Fundus Photography       Left Eye - Please check Retinopathy or No Retinopathy        Exam did show retinopathy    Exam did not show retinopathy       Right Eye - Please check Retinopathy or No Retinopathy       Exam did show retinopathy    Exam did not show retinopathy       Comments __________________________________________________________    Practice Providing Exam ______________________________________________    Exam Performed By (print name) _______________________________________      Provider Signature ___________________________________________________      These reports are needed for  compliance.    Please fax this completed form and a copy of the Diabetic Eye Exam report to the [facility] Based Department as soon as possible via Fax [phone], attention Mee: Phone [phone]. Our office is located at [address] [place] [address].     We thank you for your assistance in treating our mutual patient.

## 2025-04-16 NOTE — TELEPHONE ENCOUNTER
Upon review of the In Basket request and the patient's chart, initial outreach has been made via fax to facility. Please see Contacts section for details.     Thank you  Mee Magallon MA

## 2025-04-25 DIAGNOSIS — R60.0 EDEMA OF BOTH LEGS: ICD-10-CM

## 2025-04-25 DIAGNOSIS — Z79.4 TYPE 2 DIABETES MELLITUS WITH HYPERGLYCEMIA, WITH LONG-TERM CURRENT USE OF INSULIN (HCC): ICD-10-CM

## 2025-04-25 DIAGNOSIS — E11.65 TYPE 2 DIABETES MELLITUS WITH HYPERGLYCEMIA, WITH LONG-TERM CURRENT USE OF INSULIN (HCC): ICD-10-CM

## 2025-04-25 RX ORDER — FUROSEMIDE 20 MG/1
TABLET ORAL
Qty: 30 TABLET | Refills: 5 | Status: SHIPPED | OUTPATIENT
Start: 2025-04-25

## 2025-04-25 NOTE — TELEPHONE ENCOUNTER
Received a call from the patient stating that her [facility] pharmacy will no longer be caring Ozempic. She is asking for a refill of: semaglutide, 2 mg/dose, (Ozempic, 2 MG/DOSE,) 8 mg/ mL injection pen  to be sent to     Pharmacy: [facility] in [place]    Thanks!

## 2025-04-28 ENCOUNTER — NURSE TRIAGE (OUTPATIENT)
Age: 55
End: 2025-04-28

## 2025-04-28 DIAGNOSIS — Z97.5 BREAKTHROUGH BLEEDING ON NEXPLANON: Primary | ICD-10-CM

## 2025-04-28 DIAGNOSIS — N92.1 BREAKTHROUGH BLEEDING ON NEXPLANON: Primary | ICD-10-CM

## 2025-04-28 NOTE — TELEPHONE ENCOUNTER
Regarding: vaginal bleeding with Nexplanon  ----- Message from Ifrah CRAMER sent at 4/28/2025  1:00 PM EDT -----  Patient has Nexplanon from 5/31/24. She has vaginal bleeding and is concerned. She is a [facility] pt.

## 2025-04-28 NOTE — TELEPHONE ENCOUNTER
S/w patient to review recommendation per Cherri HEREDIA. Patient verbalizes understanding. Warm transfer to [facility] with central scheduling to schedule ultrasound.

## 2025-04-28 NOTE — TELEPHONE ENCOUNTER
"FOLLOW UP: Message to Cherri HEREDIA    REASON FOR CONVERSATION: Vaginal Bleeding    SYMPTOMS: patient with nexplanon inserted 5/31/25 calling to report irregular bleeding that began about a week ago. Report a few episodes of passing a clot about the size of a 1/2 dollar. Denies any bleeding outside of the clots and it just one at a time and not every day. Denies cramping, abnormal discharge, odor, itching, burning, irritation, fever    OTHER: Reviewed bleeding precautions.     DISPOSITION: Home Care      Reason for Disposition   Has Implanon subdermal implant    Answer Assessment - Initial Assessment Questions  1. BLEEDING SEVERITY: \"Describe the bleeding that you are having.\" \"How much bleeding is there?\"       Intermittent - comes out in a dark red clot about the size of a half dollar a couple times since last week  2. ONSET: \"When did the bleeding begin?\" \"Is it continuing now?\"      Last week  3. MENSTRUAL PERIOD: \"When was the last normal menstrual period?\" \"How is this different than your period?\"      Not since Nexplanon insertion 5/2024  4. REGULARITY: \"How regular are your periods?\"      none  5. ABDOMEN PAIN: \"Do you have any pain?\" \"How bad is the pain?\"  (e.g., Scale 0-10; none, mild, moderate, or severe)      denies  6. PREGNANCY: \"Is there any chance you are pregnant?\" \"When was your last menstrual period?\"      denies  7. BREASTFEEDING: \"Are you breastfeeding?\"      denies  8. HORMONE MEDICINES: \"Are you taking any hormone medicines, prescription or over-the-counter?\" (e.g., birth control pills, estrogen)      nexplanon  9. BLOOD THINNER MEDICINES: \"Do you take any blood thinners?\" (e.g., Coumadin / warfarin, Pradaxa / dabigatran, aspirin)      denies  10. CAUSE: \"What do you think is causing the bleeding?\" (e.g., recent gyn surgery, recent gyn procedure; known bleeding disorder, cervical cancer, polycystic ovarian disease, fibroids)          unsure  11. HEMODYNAMIC STATUS: \"Are you weak or feeling " "lightheaded?\" If Yes, ask: \"Can you stand and walk normally?\"         denies  12. OTHER SYMPTOMS: \"What other symptoms are you having with the bleeding?\" (e.g., passed tissue, vaginal discharge, fever, menstrual-type cramps)        Denies abnormal discharge, odor, itching, burning, irritation, fever    Protocols used: Vaginal Bleeding - Abnormal-Adult-[place]    "

## 2025-04-29 ENCOUNTER — HOSPITAL ENCOUNTER (OUTPATIENT)
Dept: ULTRASOUND IMAGING | Facility: HOSPITAL | Age: 55
Discharge: HOME | End: 2025-04-29
Attending: ADVANCED PRACTICE MIDWIFE
Payer: COMMERCIAL

## 2025-04-29 DIAGNOSIS — Z97.5 BREAKTHROUGH BLEEDING ON NEXPLANON: ICD-10-CM

## 2025-04-29 DIAGNOSIS — N92.1 BREAKTHROUGH BLEEDING ON NEXPLANON: ICD-10-CM

## 2025-04-29 DIAGNOSIS — E11.65 TYPE 2 DIABETES MELLITUS WITH HYPERGLYCEMIA, WITH LONG-TERM CURRENT USE OF INSULIN (HCC): ICD-10-CM

## 2025-04-29 DIAGNOSIS — Z79.4 TYPE 2 DIABETES MELLITUS WITH HYPERGLYCEMIA, WITH LONG-TERM CURRENT USE OF INSULIN (HCC): ICD-10-CM

## 2025-04-29 PROCEDURE — 76830 TRANSVAGINAL US NON-OB: CPT

## 2025-04-29 PROCEDURE — 76856 US EXAM PELVIC COMPLETE: CPT

## 2025-04-30 ENCOUNTER — RESULTS FOLLOW-UP (OUTPATIENT)
Dept: OBGYN CLINIC | Facility: CLINIC | Age: 55
End: 2025-04-30

## 2025-04-30 DIAGNOSIS — Z79.4 TYPE 2 DIABETES MELLITUS WITH HYPERGLYCEMIA, WITH LONG-TERM CURRENT USE OF INSULIN (HCC): ICD-10-CM

## 2025-04-30 DIAGNOSIS — E11.65 TYPE 2 DIABETES MELLITUS WITH HYPERGLYCEMIA, WITH LONG-TERM CURRENT USE OF INSULIN (HCC): ICD-10-CM

## 2025-04-30 NOTE — TELEPHONE ENCOUNTER
Patient stated she called for her prescription for ozempic to be sent to [facility] in [place].  Someone at the pharmacy mistakenly transferred her prescription to [facility] in [place] and they do not carry the medication there.  Patient is requesting that someone please resend the ozempic prescription to [facility].

## 2025-04-30 NOTE — TELEPHONE ENCOUNTER
[facility] she is not going to get all of her medication from them that is there policy.   ]    Pt asking to send ozempic to [facility] pharmacy in [place]  [facility]

## 2025-04-30 NOTE — TELEPHONE ENCOUNTER
Call placed to Layla.  Medical communication consent indicates can leave message. Left message that additional  follow-up testing needs to be done regarding recent ultrasound.  Staff will contact to schedule colposcopy with a physician in the practice.  I would like to speak with her regarding results.

## 2025-04-30 NOTE — TELEPHONE ENCOUNTER
Charlie Rich's call. Reviewed ultrasound and need for Colposcopy with one of the physicians. Reviewed colposcopy procedure and that a biopsy may be done at time of procedure. Will have staff contact to schedule.

## 2025-04-30 NOTE — TELEPHONE ENCOUNTER
Patient returning Cherri call in regards to US pelvis results, warm transfer to [facility], unsuccessful. Please call patient back.

## 2025-04-30 NOTE — TELEPHONE ENCOUNTER
"Patient confirmed it's [facility] pharmacy in [place]. Not \"[facility]\"  "

## 2025-05-01 ENCOUNTER — TELEPHONE (OUTPATIENT)
Dept: OBGYN CLINIC | Facility: CLINIC | Age: 55
End: 2025-05-01

## 2025-05-01 NOTE — TELEPHONE ENCOUNTER
Who called:STAFF     Is the patient Pregnant ?No  If so, How many weeks? N/A    Reason for the Call:Schedule Colposcopy    Action Taken: Spoke to patient      Outcome/Plan/ Recommendations:  Scheduled Colposcopy for cervical mass on 6/5 with Dr. Bernard. Also put on the wait list.

## 2025-05-05 ENCOUNTER — TELEPHONE (OUTPATIENT)
Age: 55
End: 2025-05-05

## 2025-05-05 ENCOUNTER — TELEPHONE (OUTPATIENT)
Dept: GASTROENTEROLOGY | Facility: CLINIC | Age: 55
End: 2025-05-05

## 2025-05-05 ENCOUNTER — HOSPITAL ENCOUNTER (OUTPATIENT)
Dept: MRI IMAGING | Facility: HOSPITAL | Age: 55
Discharge: HOME | End: 2025-05-05
Attending: NURSE PRACTITIONER
Payer: COMMERCIAL

## 2025-05-05 DIAGNOSIS — C69.42 MALIGNANT MELANOMA OF UVEA OF LEFT EYE (HCC): ICD-10-CM

## 2025-05-05 PROCEDURE — A9585 GADOBUTROL INJECTION: HCPCS | Performed by: NURSE PRACTITIONER

## 2025-05-05 PROCEDURE — 74183 MRI ABD W/O CNTR FLWD CNTR: CPT

## 2025-05-05 RX ORDER — GADOBUTROL 604.72 MG/ML
14 INJECTION INTRAVENOUS
Status: COMPLETED | OUTPATIENT
Start: 2025-05-05 | End: 2025-05-05

## 2025-05-05 RX ADMIN — GADOBUTROL 14 ML: 604.72 INJECTION INTRAVENOUS at 16:02

## 2025-05-05 NOTE — TELEPHONE ENCOUNTER
Phone call from patient calling to see if office can provide her with Dexcom G7 Sensor Sample. Patient scheduled for MRI today, and last one will be removed. Message sent via Teams (no response). Contacted Tobias Rice - patient transferred to office at their request.

## 2025-05-05 NOTE — TELEPHONE ENCOUNTER
Patient called in asking if we had G7 sensor samples due to patient getting an MRI today and needing to take the sensor they have off but they do not have any others. Patient will be picking up 1 G7 sensor sample.

## 2025-05-06 ENCOUNTER — TELEPHONE (OUTPATIENT)
Age: 55
End: 2025-05-06

## 2025-05-06 DIAGNOSIS — E78.00 HYPERCHOLESTEROLEMIA: ICD-10-CM

## 2025-05-06 RX ORDER — ATORVASTATIN CALCIUM 10 MG/1
10 TABLET, FILM COATED ORAL DAILY
Qty: 90 TABLET | Refills: 3 | Status: SHIPPED | OUTPATIENT
Start: 2025-05-06

## 2025-05-06 NOTE — TELEPHONE ENCOUNTER
Patient requesting refill(s) of: atorvastatin     Last filled: 7/26/24  Last appt: 4/15/25  Next appt: 10/15/25  Pharmacy: [facility]

## 2025-05-09 ENCOUNTER — RESULTS FOLLOW-UP (OUTPATIENT)
Dept: FAMILY MEDICINE CLINIC | Facility: CLINIC | Age: 55
End: 2025-05-09

## 2025-05-09 ENCOUNTER — TELEPHONE (OUTPATIENT)
Dept: ADMINISTRATIVE | Facility: OTHER | Age: 55
End: 2025-05-09

## 2025-05-09 ENCOUNTER — NURSE TRIAGE (OUTPATIENT)
Age: 55
End: 2025-05-09

## 2025-05-09 ENCOUNTER — OFFICE VISIT (OUTPATIENT)
Dept: ENDOCRINOLOGY | Facility: CLINIC | Age: 55
End: 2025-05-09
Payer: COMMERCIAL

## 2025-05-09 VITALS
RESPIRATION RATE: 18 BRPM | HEIGHT: 66 IN | OXYGEN SATURATION: 94 % | TEMPERATURE: 97.8 F | SYSTOLIC BLOOD PRESSURE: 126 MMHG | HEART RATE: 80 BPM | WEIGHT: 293 LBS | DIASTOLIC BLOOD PRESSURE: 78 MMHG | BODY MASS INDEX: 47.09 KG/M2

## 2025-05-09 DIAGNOSIS — E78.2 MIXED HYPERLIPIDEMIA: ICD-10-CM

## 2025-05-09 DIAGNOSIS — E11.65 TYPE 2 DIABETES MELLITUS WITH HYPERGLYCEMIA, WITH LONG-TERM CURRENT USE OF INSULIN (HCC): ICD-10-CM

## 2025-05-09 DIAGNOSIS — I10 ESSENTIAL HYPERTENSION: ICD-10-CM

## 2025-05-09 DIAGNOSIS — E11.65 TYPE 2 DIABETES MELLITUS WITH HYPERGLYCEMIA, WITH LONG-TERM CURRENT USE OF INSULIN (HCC): Primary | ICD-10-CM

## 2025-05-09 DIAGNOSIS — Z79.4 TYPE 2 DIABETES MELLITUS WITH HYPERGLYCEMIA, WITH LONG-TERM CURRENT USE OF INSULIN (HCC): Primary | ICD-10-CM

## 2025-05-09 DIAGNOSIS — Z79.4 TYPE 2 DIABETES MELLITUS WITH HYPERGLYCEMIA, WITH LONG-TERM CURRENT USE OF INSULIN (HCC): ICD-10-CM

## 2025-05-09 PROCEDURE — 95251 CONT GLUC MNTR ANALYSIS I&R: CPT | Performed by: STUDENT IN AN ORGANIZED HEALTH CARE EDUCATION/TRAINING PROGRAM

## 2025-05-09 PROCEDURE — 99214 OFFICE O/P EST MOD 30 MIN: CPT | Performed by: STUDENT IN AN ORGANIZED HEALTH CARE EDUCATION/TRAINING PROGRAM

## 2025-05-09 NOTE — LETTER
Diabetic Eye Exam Form    Date Requested: 25  Patient: Layla Desai  Patient : 1970   Referring Provider: Rogelio Villafana MD      DIABETIC Eye Exam Date _______________________________      Type of Exam MUST be documented for Diabetic Eye Exams. Please CHECK ONE.     Retinal Exam       Dilated Retinal Exam       OCT       Optomap-Iris Exam      Fundus Photography       Left Eye - Please check Retinopathy or No Retinopathy        Exam did show retinopathy    Exam did not show retinopathy       Right Eye - Please check Retinopathy or No Retinopathy       Exam did show retinopathy    Exam did not show retinopathy       Comments __________________________________________________________    Practice Providing Exam ______________________________________________    Exam Performed By (print name) _______________________________________      Provider Signature ___________________________________________________      These reports are needed for  compliance.    Please fax this completed form and a copy of the Diabetic Eye Exam report to the [facility] Based Department as soon as possible via Fax [phone], attention Mackenzie: Phone [phone]. Our office is located at [address] [place] [address].     We thank you for your assistance in treating our mutual patient.

## 2025-05-09 NOTE — TELEPHONE ENCOUNTER
Upon review of the In Basket request and the patient's chart, initial outreach has been made via fax to facility. Please see Contacts section for details.     Thank you  Mackenzie Jane MA

## 2025-05-09 NOTE — TELEPHONE ENCOUNTER
Regarding: medication problem  ----- Message from Gwendolyn BULLOCK sent at 5/9/2025 10:54 AM EDT -----  The pt called that the [facility] in [place] no longer can get the Mounjaro and pt asking if the script can be sent to the [facility] in [place]. She was just in the office this morning with Dr. Villafana

## 2025-05-09 NOTE — TELEPHONE ENCOUNTER
----- Message from Tamela MENDEZ sent at 5/9/2025  9:24 AM EDT -----  05/09/25 9:24 AM    Hello, our patient Layla Desai has had Diabetic Eye Exam completed/performed. Please assist in updating the patient chart by making an External outreach to [facility] in [place]. The date of service is 2024.    Thank you,  Tamela Betancourt MA  [facility] [place]

## 2025-05-09 NOTE — PROGRESS NOTES
Name: Layla Desai      : 1970      MRN: [id number]  Encounter Provider: Rogelio Villafana MD  Encounter Date: 2025   Encounter department: [facility] [place]    Chief Complaint   Patient presents with   • Follow-up   :  Assessment & Plan  Type 2 diabetes mellitus with hyperglycemia, with long-term current use of insulin (HCC)    Lab Results   Component Value Date    HGBA1C 7.7 (H) 2025     Her A1c has improved from 8.7% to 7.7%, but the goal is to maintain it below 7%. She is currently using the OmniPod 5 and Dexcom G7, although she is using manual mode, we reached out to [employer] service , she will receive a call for assistance to switch her to automode. This will be very beneficial. She also taking Ozempic 2 mg once a week, and Jardiance 25 mg once a day. She was advised to ensure proper carb counting before meals. A switch from Ozempic to Mounjaro was recommended, pending insurance approval. She was instructed to finish her current supply of Ozempic before starting Mounjaro. A list of suitable foods was provided, and she was advised to limit her carbohydrate intake to 30-45 grams per meal and 15 grams per snack. A referral for an eye examination was made.  Orders:  •  Hemoglobin A1C; Future  •  Comprehensive metabolic panel; Future    Essential hypertension  Blood pressure at goal, 128/78, to continue current regimen.         Mixed hyperlipidemia  She is currently taking lipitor 10 mg daily which will be continued.             History of Present Illness   History of Present Illness  The patient presents for evaluation of type 2 diabetes.      Layla Desai is a 54 y.o. female with type 2 diabetes seen in follow up. Denies recent severe hypoglycemic or severe hyperglycemic episodes. Denies any issues with her current regimen. Last A1C was 7.7%. Denies recent illness, hospitalization or steroid use.       She is currently utilizing the OmniPod 5  insulin pump and Dexcom G7 continuous glucose monitor. Her medication regimen includes Ozempic 2 mg once weekly and Jardiance 25 mg daily. She has previously been on metformin, which was discontinued due to persistently elevated blood glucose levels, even though she did not experience any adverse effects from the medication. She reports no issues with her insulin pump, which she operates in manual mode. She does not experience any symptoms when her blood glucose levels are low, but relies on her sensor's alarm system for alerts. She admits to consuming cookies the previous night, which resulted in elevated blood glucose levels this morning. Her dietary habits include skipping breakfast, consuming a small meal around 1 or 2 PM, and snacking throughout the day. Her typical meals consist of canned soup and white bread for brunch, and mashed potatoes, rice, macaroni and cheese, or meat for dinner around 7 or 8 PM.         She is on atorvastatin 10 mg a day.    CGM Interpretation:  Date Range: April 26 - may 9  Device used: dexcom G7  Option - Home use x Professional Use- Physician Provided Equipment  Option - Indication: Type 2 Diabetes  Analysis of data:   Average Glucose: 179 mg/dl  GMI: 7.6%  Coefficient of Variation: 2.1%  SD: 40 mg/dL  Glucose Variability: x%  Time in Target Range: 53%   Time Above Range: 44% high, 3% very high  Time Below Range: 0% low, 0% very low    More than 72 hours of data was reviewed. Report to be scanned to chart.     We were not able to download her pump report, our staff reached out to OmniPod representative, and you ID and password was created, no reports is available,    As per last Richelle's note:  Basal Rate: 1.75 u/hr  Carb Ratio: 6  Sensitivity: 24  Active Insulin Time:3  hours        Last Eye Exam: Not on file  Last Foot Exam: 04/02/2025  Health Maintenance   Topic Date Due   • Diabetic Eye Exam  11/06/2021   • Diabetic Foot Exam  04/02/2026     Pertinent Medical History          "  Review of Systems as per HPI    Medical History Reviewed by provider this encounter:     .  Current Outpatient Medications on File Prior to Visit   Medication Sig Dispense Refill   • albuterol (PROVENTIL HFA,VENTOLIN HFA) 90 mcg/act inhaler Inhale 2 puffs every 6 (six) hours as needed for wheezing 18 g 5   • Alcohol Swabs (Alcohol Prep) 70 % PADS      • atorvastatin (LIPITOR) 10 mg tablet Take 1 tablet (10 mg total) by mouth daily 90 tablet 3   • B-D UF III MINI PEN NEEDLES 31G X 5 MM MISC USE TO INJECT INSULIN 4 TIMES DAILY IN CASE OF PUMP FAILURE. 300 each 1   • Empagliflozin (Jardiance) 25 MG TABS take 1 tablet by mouth every morning 30 tablet 5   • escitalopram (LEXAPRO) 10 mg tablet Take 1 tablet by mouth in the morning     • fluticasone (FLONASE) 50 mcg/act nasal spray 1 spray into each nostril daily 16 g 0   • furosemide (LASIX) 20 mg tablet take 1 tablet by mouth once daily (AS NEEDED FOR LEG SWELLING) 30 tablet 5   • gabapentin (NEURONTIN) 100 mg capsule Take 2 capsules (200 mg total) by mouth 3 (three) times a day 180 capsule 0   • glucose blood (FREESTYLE TEST STRIPS) test strip Use to check blood sugar four times a day in case of CGM failure 120 strip 3   • Insulin Disposable Pump (Omnipod 5 [id number] Pods Gen 5) MISC Use to disp insulin continuously, replace every 3 days, max daily dose of 200 daily 30 each 5   • Insulin Disposable Pump (Omnipod 5 G6 Intro, Gen 5,) KIT Disp one kit for insulin control 1 kit 0   • Insulin Syringe-Needle U-100 (BD Insulin Syringe U/F) 31G X 5/16\" 0.5 ML MISC Use 3 (three) times a day To use with Novolog incase pump failure 300 each 1   • multivitamin (THERAGRAN) TABS Take 1 tablet by mouth daily     • NovoLOG 100 UNIT/ML injection Inject 300 units into omnipod every 72 hours 90 mL 1   • OLANZapine (ZyPREXA) 15 mg tablet      • omeprazole (PriLOSEC) 40 MG capsule Take 1 capsule (40 mg total) by mouth daily 90 capsule 1   • polyethylene glycol (GOLYTELY) 4000 mL solution " "Take 4,000 mL by mouth once for 1 dose 4000 mL 0   • predniSONE 10 mg tablet 4 x 3 days, 3 x 1, 2 x 1, 1 x 1 18 tablet 0   • traZODone (DESYREL) 100 mg tablet Take 100-200 mg by mouth daily     • Insulin Glargine Solostar (Lantus SoloStar) 100 UNIT/ML SOPN Inject 36 Units under the skin daily at bedtime (Patient not taking: Reported on 4/2/2025) 30 mL 1   • NovoLOG FlexPen 100 units/mL injection pen INJECT 12-14 UNITS PRIOR TO EACH MEAL 3 TIMES A DAY INCASE OF PUMP FAILURE (Patient not taking: Reported on 5/9/2025) 15 mL 1     Current Facility-Administered Medications on File Prior to Visit   Medication Dose Route Frequency Provider Last Rate Last Admin   • etonogestrel (NEXPLANON) subdermal implant 68 mg  68 mg Subdermal Once every 3 years    68 mg at 06/03/24 1240         Medical History Reviewed by provider this encounter:     .    Objective   /78 (BP Location: Right arm, Patient Position: Sitting, Cuff Size: Large)   Pulse 80   Temp 97.8 °F (36.6 °C) (Temporal)   Resp 18   Ht 5' 6\" (1.676 m)   Wt (!) 152 kg (334 lb)   SpO2 94%   BMI 53.91 kg/m²      Body mass index is 53.91 kg/m².  Wt Readings from Last 3 Encounters:   05/09/25 (!) 152 kg (334 lb)   04/15/25 (!) 147 kg (323 lb 12.8 oz)   04/02/25 (!) 148 kg (327 lb)     Physical Exam  Vital Signs  Blood pressure is 126/78.  Physical Exam  Constitutional:       General: She is not in acute distress.     Appearance: She is not ill-appearing.   HENT:      Head: Normocephalic and atraumatic.   Pulmonary:      Effort: Pulmonary effort is normal. No respiratory distress.   Neurological:      Mental Status: She is oriented to person, place, and time.       Results  Laboratory Studies  A1c is 7.7.    Testing  Dexcom report shows blood sugars are 53% in range, 45% high, and 3% very high.  Labs:   Lab Results   Component Value Date    HGBA1C 7.7 (H) 04/02/2025    HGBA1C 8.7 (A) 07/24/2024    HGBA1C 6.7 (H) 10/05/2023     Lab Results   Component Value Date    " CREATININE 0.93 04/02/2025    CREATININE 0.95 10/02/2023    CREATININE 0.81 07/05/2023    BUN 12 04/02/2025     06/01/2018    K 4.5 04/02/2025     04/02/2025    CO2 24 04/02/2025     GFR, Calculated   Date Value Ref Range Status   03/25/2020 83 >60 mL/min/1.73m2 Final     Comment:     mL/min per 1.73 square meters                                            Normal Function or Mild Renal    Disease (if clinically at risk):  >or=60  Moderately Decreased:                30-59  Severely Decreased:                  15-29  Renal Failure:                         <15                                            -American GFR: multiply reported GFR by 1.16    Please note that the eGFR is based on the CKD-EPI calculation, and is not intended to be used for drug dosing.     eGFR   Date Value Ref Range Status   04/02/2025 70 ml/min/1.73sq m Final     Lab Results   Component Value Date    CHOL 142 06/01/2018    HDL 44 (L) 04/02/2025    TRIG 123 04/02/2025     Lab Results   Component Value Date    ALT 18 04/02/2025    AST 15 04/02/2025    ALKPHOS 66 04/02/2025    BILITOT 0.4 06/01/2018     Lab Results   Component Value Date    LKT0DWDBMVBD 0.902 10/02/2023       There are no Patient Instructions on file for this visit.    Discussed with the patient and all questioned fully answered. She will call me if any problems arise.

## 2025-05-09 NOTE — TELEPHONE ENCOUNTER
"Medication sent to requested pharmacy.    Answer Assessment - Initial Assessment Questions  1. NAME of MEDICINE: \"What medicine(s) are you calling about?\"      Tirzepatide   2. QUESTION: \"What is your question?\" (e.g., double dose of medicine, side effect)      Send to different pharmacy   3. PRESCRIBER: \"Who prescribed the medicine?\" Reason: if prescribed by specialist, call should be referred to that group.      Dr Marbella Villafana    Protocols used: Medication Question Call-Adult-[place]    "

## 2025-05-09 NOTE — ASSESSMENT & PLAN NOTE
Lab Results   Component Value Date    HGBA1C 7.7 (H) 04/02/2025     Her A1c has improved from 8.7% to 7.7%, but the goal is to maintain it below 7%. She is currently using the OmniPod 5 and Dexcom G7, although she is using manual mode, we reached out to [employer] service , she will receive a call for assistance to switch her to automode. This will be very beneficial. She also taking Ozempic 2 mg once a week, and Jardiance 25 mg once a day. She was advised to ensure proper carb counting before meals. A switch from Ozempic to Mounjaro was recommended, pending insurance approval. She was instructed to finish her current supply of Ozempic before starting Mounjaro. A list of suitable foods was provided, and she was advised to limit her carbohydrate intake to 30-45 grams per meal and 15 grams per snack. A referral for an eye examination was made.  Orders:  •  Hemoglobin A1C; Future  •  Comprehensive metabolic panel; Future

## 2025-05-11 PROBLEM — E78.2 MIXED HYPERLIPIDEMIA: Status: ACTIVE | Noted: 2025-05-11

## 2025-05-12 ENCOUNTER — DOCUMENTATION (OUTPATIENT)
Dept: ADMINISTRATIVE | Facility: OTHER | Age: 55
End: 2025-05-12

## 2025-05-12 NOTE — TELEPHONE ENCOUNTER
Upon review of the In Basket request we have found as a result of outreach that the patient did not have the requested item(s) completed or the patient was not seen by the practice.     Any additional questions or concerns should be emailed to the Practice Liaisons via the appropriate education email address, please do not reply via In Basket.    Thank you  Mackenzie Jane MA   [facility]

## 2025-05-27 ENCOUNTER — TELEPHONE (OUTPATIENT)
Age: 55
End: 2025-05-27

## 2025-05-27 NOTE — TELEPHONE ENCOUNTER
PA for Omnipod 5 G6 Intro Gen 5 KIT SUBMITTED to [employer]    via    [x]CMM-KEY: [id number]  []Surescripts-Case ID #  []Availity-Auth ID # NDC #   []Faxed to plan   []Other website   []Phone call Case ID #     [x]PA sent as URGENT    All office notes, labs and other pertaining documents and studies sent. Clinical questions answered. Awaiting determination from insurance company.     Turnaround time for your insurance to make a decision on your Prior Authorization can take 7-21 business days.

## 2025-05-30 ENCOUNTER — HOSPITAL ENCOUNTER (OUTPATIENT)
Dept: RADIOLOGY | Facility: HOSPITAL | Age: 55
Discharge: HOME | End: 2025-05-30
Payer: COMMERCIAL

## 2025-05-30 DIAGNOSIS — C69.42 MALIGNANT MELANOMA OF UVEA OF LEFT EYE (HCC): ICD-10-CM

## 2025-05-30 PROCEDURE — 71046 X-RAY EXAM CHEST 2 VIEWS: CPT

## 2025-06-02 NOTE — TELEPHONE ENCOUNTER
PA for Omnipod 5 G6 Intro Gen 5 KIT APPROVED     Date(s) approved 04/01/2025-05/27/2026      Patient advised by      Picked up from pharmacy    [x]"[employer]hart Message  []Phone call   []LMOM  []L/M to call office as no active Communication consent on file  []Unable to leave detailed message as VM not approved on Communication consent       Pharmacy advised by    [x]Fax  []Phone call  []Secure Chat    Specialty Pharmacy    []     Approval letter scanned into [employer] Yes

## 2025-06-05 ENCOUNTER — OFFICE VISIT (OUTPATIENT)
Dept: ENDOCRINOLOGY | Facility: CLINIC | Age: 55
End: 2025-06-05
Payer: COMMERCIAL

## 2025-06-05 ENCOUNTER — PROCEDURE VISIT (OUTPATIENT)
Dept: OBGYN CLINIC | Facility: CLINIC | Age: 55
End: 2025-06-05
Payer: COMMERCIAL

## 2025-06-05 ENCOUNTER — NURSE TRIAGE (OUTPATIENT)
Age: 55
End: 2025-06-05

## 2025-06-05 VITALS
BODY MASS INDEX: 53.13 KG/M2 | HEART RATE: 81 BPM | WEIGHT: 293 LBS | DIASTOLIC BLOOD PRESSURE: 68 MMHG | TEMPERATURE: 97.8 F | RESPIRATION RATE: 18 BRPM | SYSTOLIC BLOOD PRESSURE: 122 MMHG | OXYGEN SATURATION: 96 %

## 2025-06-05 VITALS
BODY MASS INDEX: 47.09 KG/M2 | WEIGHT: 293 LBS | SYSTOLIC BLOOD PRESSURE: 118 MMHG | HEIGHT: 66 IN | DIASTOLIC BLOOD PRESSURE: 72 MMHG

## 2025-06-05 DIAGNOSIS — Z32.02 NEGATIVE PREGNANCY TEST: ICD-10-CM

## 2025-06-05 DIAGNOSIS — E11.65 TYPE 2 DIABETES MELLITUS WITH HYPERGLYCEMIA, WITH LONG-TERM CURRENT USE OF INSULIN (HCC): Primary | ICD-10-CM

## 2025-06-05 DIAGNOSIS — E78.2 MIXED HYPERLIPIDEMIA: ICD-10-CM

## 2025-06-05 DIAGNOSIS — I10 ESSENTIAL HYPERTENSION: ICD-10-CM

## 2025-06-05 DIAGNOSIS — Z79.4 TYPE 2 DIABETES MELLITUS WITH HYPERGLYCEMIA, WITH LONG-TERM CURRENT USE OF INSULIN (HCC): Primary | ICD-10-CM

## 2025-06-05 DIAGNOSIS — N88.8 CERVICAL MASS: Primary | ICD-10-CM

## 2025-06-05 LAB — SL AMB POCT URINE HCG: NEGATIVE

## 2025-06-05 PROCEDURE — 88305 TISSUE EXAM BY PATHOLOGIST: CPT | Performed by: PATHOLOGY

## 2025-06-05 PROCEDURE — 88341 IMHCHEM/IMCYTCHM EA ADD ANTB: CPT | Performed by: PATHOLOGY

## 2025-06-05 PROCEDURE — 88342 IMHCHEM/IMCYTCHM 1ST ANTB: CPT | Performed by: PATHOLOGY

## 2025-06-05 PROCEDURE — 95251 CONT GLUC MNTR ANALYSIS I&R: CPT | Performed by: STUDENT IN AN ORGANIZED HEALTH CARE EDUCATION/TRAINING PROGRAM

## 2025-06-05 PROCEDURE — 81025 URINE PREGNANCY TEST: CPT | Performed by: OBSTETRICS & GYNECOLOGY

## 2025-06-05 PROCEDURE — 57454 BX/CURETT OF CERVIX W/SCOPE: CPT | Performed by: OBSTETRICS & GYNECOLOGY

## 2025-06-05 PROCEDURE — 99214 OFFICE O/P EST MOD 30 MIN: CPT | Performed by: STUDENT IN AN ORGANIZED HEALTH CARE EDUCATION/TRAINING PROGRAM

## 2025-06-05 PROCEDURE — 88344 IMHCHEM/IMCYTCHM EA MLT ANTB: CPT | Performed by: PATHOLOGY

## 2025-06-05 RX ORDER — ACYCLOVIR 400 MG/1
1 TABLET ORAL
Qty: 9 EACH | Refills: 3 | Status: SHIPPED | OUTPATIENT
Start: 2025-06-05

## 2025-06-05 NOTE — ASSESSMENT & PLAN NOTE
Blood pressure at today's visit is 122/68, the goal less than 130/80.  Continue current regimen.

## 2025-06-05 NOTE — ASSESSMENT & PLAN NOTE
Lab Results   Component Value Date    HGBA1C 7.7 (H) 04/02/2025     Her blood glucose levels have been suboptimal, with only 45% of readings within the desired range over the past two weeks, and an average reading of 187. This corresponds to a GMI of 7.8%. It was observed that her blood glucose levels were well-controlled on one day, likely due to her not consuming any food. The functionality of her pump appears to be satisfactory when she abstains from eating, I noticed that she stop inserting carb amount since started automode.  She was advised to continue counting her carbohydrate intake and ensure accurate amount inserted in her pump prior to meals. The duration of insulin action will be adjusted from 3 hours to 2 hours, while maintaining the current carbohydrate ratio at 6. She will continue her Mounjaro regimen, with plans to escalate the dosage to 10, 12.5, and eventually 15, which should aid in weight loss and improve diabetes management. A prescription for Dexcom G7 sensor was provided. She is expected to undergo blood work prior to her next appointment.    Orders:  •  Continuous Glucose Sensor (Dexcom G7 Sensor); Use 1 Device every 10 days

## 2025-06-05 NOTE — TELEPHONE ENCOUNTER
"REASON FOR CONVERSATION: Hyperglycemia - no symptoms    SYMPTOMS: pt reported elevated BS since 2 weeks ago  after changing the meter from omipod to auto. Reporting previous bs baseline was around 140s. Currently 305. Pt asymptomatic . Uses dexcom , shares data with office. Confirmed using novolog in pump, jardiance 25 mg once daily, and tirzepetide 7.5 mg last injected on Fri. Doesn't use lantus.       PROTOCOL DISPOSITION: Call Transferred to PCP Now    CARE ADVICE PROVIDED: s/s to report to ED reviewed with pt    PRACTICE FOLLOW-UP: pt asked for call to be transferred to scheduling to see if there was something they can do with the pump. Spoke with clerical staff who ok'ed pt to come in.               Reason for Disposition   Caller has URGENT medication or insulin device (e.g., pump, continuous monitoring) question and triager unable to answer question    Answer Assessment - Initial Assessment Questions  1. BLOOD GLUCOSE: \"What is your blood glucose level?\"       Currently 305  2. ONSET: \"When did you check the blood glucose?\"      2 weeks ago since changing the meter from omipod to auto  3. USUAL RANGE: \"What is your glucose level usually?\" (e.g., usual fasting morning value, usual evening value)      140s  5. TYPE 1 or 2:  \"Do you know what type of diabetes you have?\"  (e.g., Type 1, Type 2, Gestational; doesn't know)       Type 2  6. INSULIN: \"Do you take insulin?\" \"What type of insulin(s) do you use? What is the mode of delivery? (syringe, pen; injection or pump)?\"       Novolog in the pump and tirzepetide 7.5 mg weekly last administered on Fri. No lantus   7. DIABETES PILLS: \"Do you take any pills for your diabetes?\" If Yes, ask: \"Have you missed taking any pills recently?\"      Jardiance 25 once daily   8. OTHER SYMPTOMS: \"Do you have any symptoms?\" (e.g., fever, frequent urination, difficulty breathing, dizziness, weakness, vomiting)      Denies    Protocols used: Diabetes - High Blood Sugar-Adult-[place]    "

## 2025-06-05 NOTE — PROGRESS NOTES
Colposcopy    Date/Time: 6/5/2025 9:00 AM    Performed by: Shawnee Bernard MD  Authorized by: Shawnee Bernard MD    Other Assisting Provider: No    Verbal consent obtained?: Yes    Written consent obtained?: Yes    Risks and benefits: Risks, benefits and alternatives were discussed    Consent given by:  Patient  Patient states understanding of procedure being performed: Yes    Patient's understanding of procedure matches consent: Yes    Procedure consent matches procedure scheduled: Yes    Required items: Required blood products, implants, devices and special equipment available    Pre-procedure:     Prepped with: acetic acid    Indication:     Indications: cervical mass on US.  Procedure:     Procedure: Colposcopy w/ cervical biopsy and ECC      Under satisfactory analgesia the patient was prepped and draped in the dorsal lithotomy position: yes      [place] speculum was placed in the vagina: yes      Under colposcopic examination the transition zone was seen in entirety: yes      Endocervix was curetted using a Kevorkian curette: yes      Cervical biopsy performed with a cervical biopsy punch: yes (moosechler, 12 oclock)      Monsel's solution was applied: yes      Allis/Tenaculum removed and cervix homostatic: yes      Specimen(s) to pathology: yes    Post-procedure:     Findings comment:  Palpably dense anterior cervix without discrete palpable or visible mass/lesion    Impression comment:  Normal    Patient tolerance of procedure:  Tolerated well, no immediate complications

## 2025-06-05 NOTE — PROGRESS NOTES
Name: Layla Desai      : 1970      MRN: [id number]  Encounter Provider: Rogelio Villafana MD  Encounter Date: 2025   Encounter department: [facility] [place]    Chief Complaint   Patient presents with   • Follow-up   :  Assessment & Plan  Type 2 diabetes mellitus with hyperglycemia, with long-term current use of insulin (HCC)    Lab Results   Component Value Date    HGBA1C 7.7 (H) 2025     Her blood glucose levels have been suboptimal, with only 45% of readings within the desired range over the past two weeks, and an average reading of 187. This corresponds to a GMI of 7.8%. It was observed that her blood glucose levels were well-controlled on one day, likely due to her not consuming any food. The functionality of her pump appears to be satisfactory when she abstains from eating, I noticed that she stop inserting carb amount since started automode.  She was advised to continue counting her carbohydrate intake and ensure accurate amount inserted in her pump prior to meals. The duration of insulin action will be adjusted from 3 hours to 2 hours, while maintaining the current carbohydrate ratio at 6. She will continue her Mounjaro regimen, with plans to escalate the dosage to 10, 12.5, and eventually 15, which should aid in weight loss and improve diabetes management. A prescription for Dexcom G7 sensor was provided. She is expected to undergo blood work prior to her next appointment.    Orders:  •  Continuous Glucose Sensor (Dexcom G7 Sensor); Use 1 Device every 10 days    Essential hypertension  Blood pressure at today's visit is 122/68, the goal less than 130/80.  Continue current regimen.       Mixed hyperlipidemia  Continue Lipitor 10 mg daily.             Pertinent Medical History           History of Present Illness   History of Present Illness    Layla Desai is a 54 y.o. female with type 2 diabetes seen in follow up. Denies recent severe hypoglycemic or  severe hyperglycemic episodes. Denies any issues with her current regimen. Last A1C was 7.7. Denies recent illness, hospitalization or steroid use.     CGM Interpretation:  Date Range: may 23 - June 5  Device used: G7  Type: Type 2 Diabetes  Home use     Analysis of data:   Average Glucose: 187 mg/dl  GMI: 7.8%  Coefficient of Variation: 23.6%  SD: 44 mg/dL  Glucose Variability: x%  Time in Target Range: 45%   Time Above Range: 49% high, 6% very high  Time Below Range: 0% low, <1% very low  Interpretation of data: Postprandial hyperglycemia  More than 72 hours of data was reviewed. Report to be scanned to chart.     Current diabetic regimen:   Jardiance 25 g daily  Mounjaro 7.5 mL weekly.    Patient is on a Omnipod 5 pump prescribed by endocrinology.      Current Insulin pump settings:  Basal rate:  1.75 units/h  Insulin to carb ratio: 6  Insulin sensitivity factor: 24  BG target: 110  Active insulin time: 3 hours  Type of insulin: NovoLog  Discussed with patient in case of malfunctioning of the pump to use basal and bolus therapy as backup which is prescribed to the patient. Also notified patient to call clinic if any issues.     Review of Systems as per [facility]    Medical History Reviewed by provider this encounter:     .  Medications Ordered Prior to Encounter[1]      Medical History Reviewed by provider this encounter:     .    Objective   /68 (BP Location: Left arm, Patient Position: Sitting, Cuff Size: Standard)   Pulse 81   Temp 97.8 °F (36.6 °C) (Temporal)   Resp 18   Wt (!) 149 kg (329 lb 3.2 oz)   SpO2 96%   BMI 53.13 kg/m²      Body mass index is 53.13 kg/m².  Wt Readings from Last 3 Encounters:   06/05/25 (!) 150 kg (330 lb 6.4 oz)   06/05/25 (!) 149 kg (329 lb 3.2 oz)   05/09/25 (!) 152 kg (334 lb)     Physical Exam    Physical Exam  Constitutional:       General: She is not in acute distress.     Appearance: She is not ill-appearing.   HENT:      Head: Normocephalic and atraumatic.   Pulmonary:       Effort: Pulmonary effort is normal. No respiratory distress.     Neurological:      Mental Status: She is oriented to person, place, and time.       Last Eye Exam: Not on file  Last Foot Exam: 04/02/2025  Health Maintenance   Topic Date Due   • Diabetic Eye Exam  11/06/2021   • Diabetic Foot Exam  04/02/2026       Results  Laboratory Studies  Average blood sugar is 187. GMI is 7.8%.  Labs: I have reviewed pertinent labs including:   Lab Results   Component Value Date    HGBA1C 7.7 (H) 04/02/2025    HGBA1C 8.7 (A) 07/24/2024    HGBA1C 6.7 (H) 10/05/2023      Lab Results   Component Value Date    CREATININE 0.93 04/02/2025    CREATININE 0.95 10/02/2023    CREATININE 0.81 07/05/2023    BUN 12 04/02/2025     06/01/2018    K 4.5 04/02/2025     04/02/2025    CO2 24 04/02/2025      GFR, Calculated   Date Value Ref Range Status   03/25/2020 83 >60 mL/min/1.73m2 Final     Comment:     mL/min per 1.73 square meters                                            Normal Function or Mild Renal    Disease (if clinically at risk):  >or=60  Moderately Decreased:                30-59  Severely Decreased:                  15-29  Renal Failure:                         <15                                            -American GFR: multiply reported GFR by 1.16    Please note that the eGFR is based on the CKD-EPI calculation, and is not intended to be used for drug dosing.     eGFR   Date Value Ref Range Status   04/02/2025 70 ml/min/1.73sq m Final      Lab Results   Component Value Date    FRT6CEKGSGCK 0.902 10/02/2023      Lab Results   Component Value Date    [id number] 21.4 (L) 10/05/2023        There are no Patient Instructions on file for this visit.    Discussed with the patient and all questioned fully answered. She will call me if any problems arise.             [1]  Current Outpatient Medications on File Prior to Visit   Medication Sig Dispense Refill   • Tirzepatide 7.5 MG/0.5ML SOAJ Inject 7.5 mg under  "the skin once a week 6 mL 1   • traZODone (DESYREL) 100 mg tablet Take 100-200 mg by mouth in the morning.     • albuterol (PROVENTIL HFA,VENTOLIN HFA) 90 mcg/act inhaler Inhale 2 puffs every 6 (six) hours as needed for wheezing 18 g 5   • Alcohol Swabs (Alcohol Prep) 70 % PADS      • atorvastatin (LIPITOR) 10 mg tablet Take 1 tablet (10 mg total) by mouth daily 90 tablet 3   • B-D UF III MINI PEN NEEDLES 31G X 5 MM MISC USE TO INJECT INSULIN 4 TIMES DAILY IN CASE OF PUMP FAILURE. 300 each 1   • Empagliflozin (Jardiance) 25 MG TABS take 1 tablet by mouth every morning 30 tablet 5   • escitalopram (LEXAPRO) 10 mg tablet Take 1 tablet by mouth in the morning     • fluticasone (FLONASE) 50 mcg/act nasal spray 1 spray into each nostril daily 16 g 0   • furosemide (LASIX) 20 mg tablet take 1 tablet by mouth once daily (AS NEEDED FOR LEG SWELLING) 30 tablet 5   • gabapentin (NEURONTIN) 100 mg capsule Take 2 capsules (200 mg total) by mouth 3 (three) times a day 180 capsule 0   • glucose blood (FREESTYLE TEST STRIPS) test strip Use to check blood sugar four times a day in case of CGM failure 120 strip 3   • Insulin Disposable Pump (Omnipod 5 [id number] Intro Gen 5) KIT Disp one kit for insulin control 1 kit 0   • Insulin Disposable Pump (Omnipod 5 [id number] Pods Gen 5) MISC Use to disp insulin continuously, replace every 3 days, max daily dose of 200 daily 30 each 5   • Insulin Glargine Solostar (Lantus SoloStar) 100 UNIT/ML SOPN Inject 36 Units under the skin daily at bedtime (Patient not taking: Reported on 6/5/2025) 30 mL 1   • Insulin Syringe-Needle U-100 (BD Insulin Syringe U/F) 31G X 5/16\" 0.5 ML MISC Use 3 (three) times a day To use with Novolog incase pump failure 300 each 1   • multivitamin (THERAGRAN) TABS Take 1 tablet by mouth in the morning.     • NovoLOG 100 UNIT/ML injection Inject 300 units into omnipod every 72 hours 90 mL 1   • NovoLOG FlexPen 100 units/mL injection pen INJECT 12-14 UNITS PRIOR TO EACH MEAL 3 " TIMES A DAY INCASE OF PUMP FAILURE (Patient not taking: Reported on 6/5/2025) 15 mL 1   • OLANZapine (ZyPREXA) 15 mg tablet      • omeprazole (PriLOSEC) 40 MG capsule Take 1 capsule (40 mg total) by mouth daily 90 capsule 1   • polyethylene glycol (GOLYTELY) 4000 mL solution Take 4,000 mL by mouth once for 1 dose 4000 mL 0   • predniSONE 10 mg tablet 4 x 3 days, 3 x 1, 2 x 1, 1 x 1 (Patient not taking: Reported on 6/5/2025) 18 tablet 0     Current Facility-Administered Medications on File Prior to Visit   Medication Dose Route Frequency Provider Last Rate Last Admin   • etonogestrel (NEXPLANON) subdermal implant 68 mg  68 mg Subdermal Once every 3 years    68 mg at 06/03/24 1240

## 2025-06-07 DIAGNOSIS — R10.10 PAIN OF UPPER ABDOMEN: ICD-10-CM

## 2025-06-08 RX ORDER — OMEPRAZOLE 40 MG/1
40 CAPSULE, DELAYED RELEASE ORAL DAILY
Qty: 90 CAPSULE | Refills: 1 | Status: SHIPPED | OUTPATIENT
Start: 2025-06-08 | End: 2025-06-15 | Stop reason: SDUPTHER

## 2025-06-12 PROCEDURE — 88341 IMHCHEM/IMCYTCHM EA ADD ANTB: CPT | Performed by: PATHOLOGY

## 2025-06-12 PROCEDURE — 88344 IMHCHEM/IMCYTCHM EA MLT ANTB: CPT | Performed by: PATHOLOGY

## 2025-06-12 PROCEDURE — 88305 TISSUE EXAM BY PATHOLOGIST: CPT | Performed by: PATHOLOGY

## 2025-06-12 PROCEDURE — 88342 IMHCHEM/IMCYTCHM 1ST ANTB: CPT | Performed by: PATHOLOGY

## 2025-06-15 DIAGNOSIS — R10.10 PAIN OF UPPER ABDOMEN: ICD-10-CM

## 2025-06-15 RX ORDER — OMEPRAZOLE 40 MG/1
40 CAPSULE, DELAYED RELEASE ORAL DAILY
Qty: 90 CAPSULE | Refills: 1 | Status: SHIPPED | OUTPATIENT
Start: 2025-06-15

## 2025-06-16 NOTE — PROGRESS NOTES
Result call attempted. VM reached. Left message with Pod in case calls back. Needs referral to and mgmt by GYN Onc for bx confirmed cervical ca.

## 2025-06-17 ENCOUNTER — RESULTS FOLLOW-UP (OUTPATIENT)
Dept: OBGYN CLINIC | Facility: MEDICAL CENTER | Age: 55
End: 2025-06-17

## 2025-06-17 DIAGNOSIS — C53.0 ENDOCERVICAL ADENOCARCINOMA (HCC): Primary | ICD-10-CM

## 2025-06-17 NOTE — RESULT ENCOUNTER NOTE
Attempted to reach pt. VM reached. Please continue to attempt contact to review results as below and need for GYN Onc referral. Referral order not placed due to pt not yet being notified of results. Please place same after contact established.

## 2025-06-18 NOTE — TELEPHONE ENCOUNTER
Patient returned call regarding results. Advised patient of results and referral to GYN onc. Patient offers no questions and verbalized understanding. Referral placed per provider's note.

## 2025-06-19 ENCOUNTER — TELEPHONE (OUTPATIENT)
Age: 55
End: 2025-06-19

## 2025-06-19 NOTE — TELEPHONE ENCOUNTER
Pt is scheduled for 6/30/25 at 11:15 am with Dr. Manzo at the [place] location (pt requested location). Pt was to be scheduled in 7 days which [place] first available was out of. Pt can be reached at [phone] to assist with getting a sooner appt.

## 2025-06-20 NOTE — TELEPHONE ENCOUNTER
Called patient and offered her sooner appt in [place] office for either Tues 6/24 or wed 6/25. Patient took appt on 6/25 at 1pm. Will call back if has any scheduling conflicts, was in the car at the time I called.

## 2025-06-25 ENCOUNTER — CONSULT (OUTPATIENT)
Dept: GYNECOLOGIC ONCOLOGY | Facility: CLINIC | Age: 55
End: 2025-06-25
Attending: OBSTETRICS & GYNECOLOGY
Payer: COMMERCIAL

## 2025-06-25 VITALS
OXYGEN SATURATION: 96 % | BODY MASS INDEX: 47.09 KG/M2 | WEIGHT: 293 LBS | HEIGHT: 66 IN | SYSTOLIC BLOOD PRESSURE: 128 MMHG | HEART RATE: 89 BPM | DIASTOLIC BLOOD PRESSURE: 82 MMHG | TEMPERATURE: 98 F

## 2025-06-25 DIAGNOSIS — C53.0 ENDOCERVICAL ADENOCARCINOMA (HCC): ICD-10-CM

## 2025-06-25 DIAGNOSIS — C53.0 MALIGNANT NEOPLASM OF ENDOCERVIX (HCC): Primary | ICD-10-CM

## 2025-06-25 PROCEDURE — 99205 OFFICE O/P NEW HI 60 MIN: CPT | Performed by: OBSTETRICS & GYNECOLOGY

## 2025-06-25 RX ORDER — OLANZAPINE 10 MG/1
TABLET, FILM COATED ORAL
COMMUNITY
Start: 2025-06-23
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
## 2025-06-25 NOTE — ASSESSMENT & PLAN NOTE
Orders:    NM PET CT skull base to mid thigh; Future    Ambulatory Referral to Radiation Oncology; Future
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
## 2025-06-26 ENCOUNTER — TELEPHONE (OUTPATIENT)
Dept: GYNECOLOGIC ONCOLOGY | Facility: CLINIC | Age: 55
End: 2025-06-26

## 2025-06-26 ENCOUNTER — TELEPHONE (OUTPATIENT)
Age: 55
End: 2025-06-26

## 2025-06-26 DIAGNOSIS — R11.0 CHEMOTHERAPY-INDUCED NAUSEA: ICD-10-CM

## 2025-06-26 DIAGNOSIS — T45.1X5A CHEMOTHERAPY-INDUCED NAUSEA: ICD-10-CM

## 2025-06-26 DIAGNOSIS — C53.0 MALIGNANT NEOPLASM OF ENDOCERVIX (HCC): Primary | ICD-10-CM

## 2025-06-26 RX ORDER — LORAZEPAM 1 MG/1
1 TABLET ORAL EVERY 6 HOURS PRN
Qty: 36 TABLET | Refills: 0 | Status: SHIPPED | OUTPATIENT
Start: 2025-06-26

## 2025-06-26 RX ORDER — ONDANSETRON 8 MG/1
8 TABLET, FILM COATED ORAL EVERY 8 HOURS PRN
Qty: 30 TABLET | Refills: 1 | Status: SHIPPED | OUTPATIENT
Start: 2025-06-26

## 2025-06-26 NOTE — TELEPHONE ENCOUNTER
Patient is scheduled on 7/14 but per navigation's suggestions she should be scheduled within 7 days.  Can someone please assist me with this, she prefers the [facility].

## 2025-06-26 NOTE — TELEPHONE ENCOUNTER
Medical/GYN Oncologist: Dr. Manzo  Infusion Start Date: 7/28  First Infusion Location: [facility]  Does chemo/RT need to start on same day Yes  What day of the week does chemo/rt need to begin Monday (or Tuesday)  Any Special Consideration    This patient was enrolled in Concurrent Chemo and Radiation Therapy.

## 2025-06-30 ENCOUNTER — PATIENT OUTREACH (OUTPATIENT)
Dept: HEMATOLOGY ONCOLOGY | Facility: CLINIC | Age: 55
End: 2025-06-30

## 2025-06-30 ENCOUNTER — TELEPHONE (OUTPATIENT)
Dept: RADIATION ONCOLOGY | Facility: CLINIC | Age: 55
End: 2025-06-30

## 2025-06-30 NOTE — PROGRESS NOTES
I reached out to Layla  to complete the Distress Thermometer, review for any barriers to care and to offer any supportive services that may be needed. I left VM with the reason for my call including my direct phone number .

## 2025-06-30 NOTE — TELEPHONE ENCOUNTER
Called pt to see if she is able to come see Dr. Carr in consult for Radiation Oncology on Friday 07/11 at 1pm at [place]. Pt did not answer. LM asking that she please call back.

## 2025-07-01 ENCOUNTER — TELEPHONE (OUTPATIENT)
Age: 55
End: 2025-07-01

## 2025-07-01 DIAGNOSIS — Z79.4 TYPE 2 DIABETES MELLITUS WITH HYPERGLYCEMIA, WITH LONG-TERM CURRENT USE OF INSULIN (HCC): ICD-10-CM

## 2025-07-01 DIAGNOSIS — E11.65 TYPE 2 DIABETES MELLITUS WITH HYPERGLYCEMIA, WITH LONG-TERM CURRENT USE OF INSULIN (HCC): ICD-10-CM

## 2025-07-01 NOTE — TELEPHONE ENCOUNTER
PA for DEXCOM G7 SENSORS SUBMITTED to [employer]    via    [x]CMM-KEY: [id number]  []Surescripts-Case ID #   []Availity-Auth ID # NDC #   []Faxed to plan   []Other website   []Phone call Case ID #     [x]PA sent as URGENT    All office notes, labs and other pertaining documents and studies sent. Clinical questions answered. Awaiting determination from insurance company.     Turnaround time for your insurance to make a decision on your Prior Authorization can take 7-21 business days.

## 2025-07-01 NOTE — TELEPHONE ENCOUNTER
Patient called stating she needs prior authorization for her dexcom g7. Please start the authorization. Computer will not let me open any scanned forms to see if started.

## 2025-07-01 NOTE — TELEPHONE ENCOUNTER
PA for DEXCOM G7 SENSORS APPROVED     Date(s) approved 5/1/25-6/30/26    Case # [id number]    Patient advised by          [x]Nuroahart Message  []Phone call   []LMOM  []L/M to call office as no active Communication consent on file  []Unable to leave detailed message as VM not approved on Communication consent       Pharmacy advised by    [x]Fax  []Phone call  []Secure Chat    Specialty Pharmacy    []     Approval letter scanned into Media Yes

## 2025-07-02 ENCOUNTER — PATIENT OUTREACH (OUTPATIENT)
Dept: HEMATOLOGY ONCOLOGY | Facility: CLINIC | Age: 55
End: 2025-07-02

## 2025-07-02 DIAGNOSIS — C53.0 MALIGNANT NEOPLASM OF ENDOCERVIX (HCC): Primary | ICD-10-CM

## 2025-07-02 RX ORDER — INSULIN PMP CART,AUT,G6/7,CNTR
EACH SUBCUTANEOUS
Qty: 30 EACH | Refills: 1 | Status: SHIPPED | OUTPATIENT
Start: 2025-07-02

## 2025-07-02 NOTE — PROGRESS NOTES
I reached out and spoke with Layla ,.  She has been seen in consult by gynecology oncology . I introduced myself and explained my role as their Patient Navigator. I reviewed for any barriers to care and offered referrals to supportive services as needed. I reviewed and updated the members assigned to the care team in [facility]. She knows the members of the care team as well as how and when to contact them with any needs.     Distress Thermometer completed at this time. Patient scored 5/10. Referral to  placed..     She is currently able to drive and denies any transportation needs.      She denies any uncontrolled symptoms. Discussed role of Palliative Care in symptom and side effect management. Declined referral at this time.    She states that she is eating and drinking as per usual with no unintentional weight loss.       Patient does not smoke.     She states she is well supported by family and friends.  Community support groups discussed including the Cancer Support Community of the [facility]. Patient declined information at this time.     She feels she has adequate insurance coverage and denies any financial concerns at this time.     She verbalizes managing the schedules well.   Future Appointments   Date Time Provider Department Center   7/3/2025  1:00 PM  PET 1 [facility]   7/14/2025 10:00 AM Laly Carr MD [facility] Rad Onc [facility]   7/23/2025  1:15 PM Froilan Manzo MD GYN ONC [facility]   7/28/2025  8:00 AM [facility] INF CHAIR 8 [facility] Infusion [facility]   8/4/2025  8:00 AM AL INF CHAIR 7 [facility] Infusion [facility]   8/11/2025  8:00 AM AL INF CHAIR 7 [facility] Infusion [facility]   8/18/2025  8:00 AM AL INF CHAIR 7 [facility] Infusion [facility]   8/22/2025  9:20 AM DMITRY Lee GYN ONC [facility] Practice[facility]On[facility]   8/25/2025  8:00 AM AL INF CHAIR 2 [facility] Infusion [facility]   9/2/2025  8:00 AM AL INF CHAIR 2 AL Infusion [facility]   9/22/2025 10:45 AM Froilan Manzo MD GYN ONC [facility] Practice[facility]Onc    10/14/2025 12:40 PM Rogelio Villafana MD [facility]   10/15/2025  1:00 PM DMITRY Hammond  Practice-Nor        Based on individual needs I will follow up in about 4 weeks. I have provided my direct contact information and welcome them to contact me if needs as discussed above change. She was appreciative for the call.

## 2025-07-02 NOTE — TELEPHONE ENCOUNTER
The patient called back to ask about the omnipods and if they please can be ordered for her as she only has 9 days left and she stated it takes the pharmacy awhile to get them in.

## 2025-07-03 ENCOUNTER — HOSPITAL ENCOUNTER (OUTPATIENT)
Dept: NUCLEAR MEDICINE | Facility: HOSPITAL | Age: 55
End: 2025-07-03
Attending: OBSTETRICS & GYNECOLOGY
Payer: COMMERCIAL

## 2025-07-03 ENCOUNTER — TELEPHONE (OUTPATIENT)
Dept: RADIATION ONCOLOGY | Facility: CLINIC | Age: 55
End: 2025-07-03

## 2025-07-03 DIAGNOSIS — C53.0 MALIGNANT NEOPLASM OF ENDOCERVIX (HCC): ICD-10-CM

## 2025-07-03 LAB — GLUCOSE SERPL-MCNC: 181 MG/DL (ref 65–140)

## 2025-07-03 PROCEDURE — A9552 F18 FDG: HCPCS

## 2025-07-03 PROCEDURE — 78815 PET IMAGE W/CT SKULL-THIGH: CPT

## 2025-07-03 PROCEDURE — 82948 REAGENT STRIP/BLOOD GLUCOSE: CPT

## 2025-07-08 ENCOUNTER — TELEPHONE (OUTPATIENT)
Dept: GYNECOLOGIC ONCOLOGY | Facility: CLINIC | Age: 55
End: 2025-07-08

## 2025-07-08 ENCOUNTER — PATIENT OUTREACH (OUTPATIENT)
Dept: CASE MANAGEMENT | Facility: HOSPITAL | Age: 55
End: 2025-07-08

## 2025-07-08 NOTE — PROGRESS NOTES
Biopsychosocial and Barriers Assessment    Type of Cancer: cervical, stage IB  Treatment plan: chemo and RT  Noted barriers to care: none noted at this time  Cultural/Jainism concerns: none noted  Hair Loss/ Wig resources needed: not discussed today    DT completed: 7/2  DT score: 5  Issues noted: tobacco use, anxiety, sadness,     Marital status/Lives with: , lives with her    Pt's support system: reports strong support from family  Mental Health history: none noted  Substance Abuse: none noted    POA/LW/HCR:  Side affect:    Employment/income source: pt reports that she does not work for the past few years  Concerns with bills (treatment vs household): not at this time  Noted issues with home: none noted    Narrative note:      MSW s/w pt by phone this afternoon to introduce myself and assess for needs prior to her starting chemo and RT for cervical cancer.  Pt will have tx at the [place] location.  She tells me that she is doing well overall and has strong support from family.  She says that her  plans to come with her for her first day of tx.  She will consult [facility] Rad Onc tomorrow to discuss Tandem/Ring tx.  She denies any questions or concerns at this time about starting treatment.  She denies the need for any additional support or any information on support groups or counseling.   She was appreciative to know that she has a SW available to her but denies any needs or concerns at this time.  I have encouraged her to reach out to me by phone anytime or to Hailey at the [facility] in person if needed, and she agrees.  I will remain available to assist or support her if her needs should change moving forward.

## 2025-07-08 NOTE — TELEPHONE ENCOUNTER
Attempted to contact the patient to schedule a virtual visit for tomorrow with .  # [phone].

## 2025-07-09 ENCOUNTER — CONSULT (OUTPATIENT)
Dept: RADIATION ONCOLOGY | Facility: CLINIC | Age: 55
End: 2025-07-09
Payer: COMMERCIAL

## 2025-07-09 ENCOUNTER — TELEMEDICINE (OUTPATIENT)
Dept: GYNECOLOGIC ONCOLOGY | Facility: CLINIC | Age: 55
End: 2025-07-09
Payer: COMMERCIAL

## 2025-07-09 VITALS
WEIGHT: 293 LBS | TEMPERATURE: 96.8 F | BODY MASS INDEX: 47.09 KG/M2 | OXYGEN SATURATION: 96 % | HEIGHT: 66 IN | HEART RATE: 81 BPM | SYSTOLIC BLOOD PRESSURE: 135 MMHG | RESPIRATION RATE: 18 BRPM | DIASTOLIC BLOOD PRESSURE: 82 MMHG

## 2025-07-09 DIAGNOSIS — C53.0 MALIGNANT NEOPLASM OF ENDOCERVIX (HCC): Primary | ICD-10-CM

## 2025-07-09 PROCEDURE — 99213 OFFICE O/P EST LOW 20 MIN: CPT | Performed by: OBSTETRICS & GYNECOLOGY

## 2025-07-09 PROCEDURE — G0463 HOSPITAL OUTPT CLINIC VISIT: HCPCS | Performed by: INTERNAL MEDICINE

## 2025-07-09 PROCEDURE — 99205 OFFICE O/P NEW HI 60 MIN: CPT | Performed by: INTERNAL MEDICINE

## 2025-07-09 PROCEDURE — 99211 OFF/OP EST MAY X REQ PHY/QHP: CPT | Performed by: INTERNAL MEDICINE

## 2025-07-09 NOTE — PROGRESS NOTES
Virtual Regular Visit  Name: Layla Desai      : 1970      MRN: [id number]  Encounter Provider: Froilan Manzo MD  Encounter Date: 2025   Encounter department: [facility] GYNECOLOGY ONCOLOGY [facility]  :  Assessment & Plan  Malignant neoplasm of endocervix (HCC)  I have discussed with the patient the possibility of metastatic lymph node involvement in the pelvis.  It is unclear whether this is true.  Given the size of the lymph nodes it would likely not be possible to biopsy these.  We discussed treatment options of whole pelvic radiation therapy vaginal brachytherapy with cisplatin alone versus cisplatin plus pembrolizumab.    I have discussed with the patient that pembrolizumab would be beneficial if lymph nodes were involved.  I have discussed the risks and benefits of pen per Qiana ramos and also the long treatment protocol that lasts nearly a year.  I have discussed with the patient if she would prefer to be overtreated or undertreated given the questionable involvement of lymph nodes.  If the questionable lymph nodes are in fact metastatic carcinoma pembrolizumab would be recommended.  If not no pembrolizumab is required.    The patient leaning toward potential of overtreatment and would prefer pembrolizumab be added to the treatment plan.  We have recommended cisplatin 40 mg/m² maxed 70 mg IV weekly for 6 successive treatments while ongoing radiation therapy.  Additionally patient will undergo pembrolizumab 200 mg IV every 3 weeks during treatment and 400 mg IV every 6 weeks after treatment.  Patient understands the risks and benefits of treatment.  I have signed an informed consent.  Patient will sign the informed consent at time of treatment.             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  She was recommended chemoradiation therapy.  A PET scan was performed and reveals the  following:  FINDINGS:     VISUALIZED BRAIN:  No acute abnormalities are seen.     HEAD/NECK:  There is a physiologic distribution of FDG. No FDG avid cervical adenopathy is seen.     FDG avid palatine tonsils with SUV max of 10. SUV max of 6.1 in the posterior nasopharynx. Findings are likely reactive/physiologic.     Few FDG avid cervical lymph nodes bilaterally with SUV max 2.2, for example a 0.8 cm right cervical lymph node (series 4 image 42), likely reactive.     CT images: Mucous retention cyst in the right maxillary sinus.     CHEST:  No FDG avid soft tissue lesions are seen.  CT images: Unremarkable.     ABDOMEN:  No FDG avid soft tissue lesions are seen.  CT images: Unremarkable.     PELVIS:  Intense FDG uptake centered in the cervix and lower uterine segment corresponding with known cervical adenocarcinoma, with SUV max of 14 (series 4 image 178).     Few FDG avid pelvic lymph nodes including left common iliac lymph node with SUV max of 2.8 (series 4 images 142).     FDG uptake next to a left external iliac node measuring 1.1 cm (series 4 image 176) is due to adjacent vasculature.     FDG avid right inguinal lymph nodes with SUV max of 4.8 (series 4 image 217) and left inguinal lymph nodes with SUV max of 3.6 (series 4 image 222), likely reactive/physiologic.     CT images: No other abnormalities.     OSSEOUS STRUCTURES:  No FDG avid lesions are seen.  CT images: No significant findings.     IMPRESSION:     1. Intense FDG activity associated with known cervical adenocarcinoma.     2. Few FDG avid pelvic lymph nodes including a left common iliac node with SUV max of 2.8 is likely reactive however, cannot exclude regional metastasis. Continued follow-up with PET/CT recommended.     3. No hypermetabolic metastases in the neck, chest or upper abdomen.    Today, the patient is doing well.  She denies significant abdominal pain, pelvic pain, nausea, vomiting, constipation, diarrhea, fevers, chills, or vaginal  bleeding.         Review of Systems   Constitutional: Negative.    HENT: Negative.     Eyes: Negative.    Respiratory: Negative.     Cardiovascular: Negative.    Gastrointestinal: Negative.    Endocrine: Negative.    Genitourinary: Negative.    Musculoskeletal: Negative.    Skin: Negative.    Neurological: Negative.    Hematological: Negative.    Psychiatric/Behavioral: Negative.         Objective   There were no vitals taken for this visit.    Physical Exam  General: Patient appears well-developed. Patient is adequately nourished. Patient is not diaphoretic. Patient is not in distress.  Neck: Visualization of the neck demonstrates no grossly visible masses. Neck mobility is not compromised, neck appears supple.   HEENT: Oral mucosa appears moist. Patient does not identify palpable neck masses. Patient reports no oral tenderness or readily identifiable masses.  Eyes: Conjunctivae appear normal bilaterally. Right eye with no discharge. Left eye with no discharge. No evidence of scleral icterus. No evidence of strabismus.  Respiratory: Respiratory effort appears normal. There is no respiratory distress. Patient able to speak in full sentences. There was no audible stridor or cough.  Abdomen: Patient states her abdomen is soft. States abdomen is non-tender. States abdomen is non-distended. Patient denies visible or palpable bulges to suggest hernias.  Musculoskeletal: Patient reports and I can confirm no visible deformities in 4 extremities. Patient reports and I can confirm full mobility in 4 extremities. There is no grossly visible limb edema. There is no evidence of clubbing or peripheral cyanosis.  Neurologic: Patient is fully alert and responsive. Patient is oriented to time, place and person. Gross evaluation of CNs III-IV-VI-VII-VIII and XI demonstrates no deficits. Patient reports normal gait and balance.  Skin: My evaluation of exposed skin areas reveals no evidence of pallor. My evaluation of exposed skin  areas reveals no obvious rashes. My evaluation of exposed skin areas reveals no grossly visible lesions. My evaluation of exposed skin areas reveals no evidence of erythema.  Psychiatric / Behavioral: Patient's mood and affect appears normal. Patient's judgement is preserved. Patient is coherent and thought content appears directionally and contextually appropriate for age and health status.   Administrative Statements   Encounter provider Froilan Manzo MD    The Patient is located at Home and in the following state in which I hold an active license [place].    The patient was identified by name and date of birth. Layla Desai was informed that this is a telemedicine visit and that the visit is being conducted through the Epic Embedded platform. She agrees to proceed..  My office door was closed. No one else was in the room.  She acknowledged consent and understanding of privacy and security of the video platform. The patient has agreed to participate and understands they can discontinue the visit at any time.    I have spent a total time of 15 minutes in caring for this patient on the day of the visit/encounter including Diagnostic results, Prognosis, Risks and benefits of tx options, Instructions for management, Patient and family education, Impressions, Counseling / Coordination of care, Documenting in the medical record, Reviewing/placing orders in the medical record (including tests, medications, and/or procedures), Obtaining or reviewing history  , and Communicating with other healthcare professionals , not including the time spent for establishing the audio/video connection.

## 2025-07-09 NOTE — ASSESSMENT & PLAN NOTE
Layla Desai is a 54 y.o. female with at least FIGO IB2 HPV-associated endocervical adenocarcinoma, s/p biopsy on 6/5/2025.  Imaging includes PET/CT on 7/3/2025 notable for intense uptake in the cervix and lower uterine segment, with a few indeterminate mildly FDG avid pelvic lymph nodes reported as likely reactive however cannot exclude regional metastases.  No distant disease.  She has not had an MRI as part of her workup.    We discussed multidisciplinary treatment for cervical cancer.  Radical hysterectomy is the Category 1 NCCN recommendation for her stage, however based on her BMI she has been referred for chemoradiation therapy.     We reviewed definitive concurrent chemoRT followed by HDR brachytherapy. This would involve 25 fractions to the pelvis with 4 fractions internally.     The benefits, alternatives and potential adverse effects of radiation therapy were explained to the patient. Risks include but are not limited to fatigue, skin reaction/dermatitis, hyperpigmentation, urinary frequency/urgency, dysuria, diarrhea, abdominal fullness/bloating, a permanent change in bowel habits, vaginal stenosis, cystitis, proctitis, risk of fistulas/adhesions, perforation, infection, laceration, bleeding, risks associated with anesthesia, and risk of secondary malignancy. We also discussed that, even with appropriate therapy, there is still a risk of progression or recurrence.    She agreed to proceed with therapy and informed consents were signed.  MRI has been ordered. CT simulation to follow. Pregnancy test ordered.  Orders:  •  Ambulatory Referral to Radiation Oncology  •  MRI pelvis female wo and w contrast; Future
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
## 2025-07-09 NOTE — PROGRESS NOTES
Consultation Visit   Name: Layla Desai      : 1970      MRN: [id number]  Encounter Provider: Laly Carr MD  Encounter Date: 2025   Encounter department: [facility] RADIATION ONCOLOGY  :  Assessment & Plan  Malignant neoplasm of endocervix (HCC)  Layla Desai is a 54 y.o. female with at least FIGO IB2 HPV-associated endocervical adenocarcinoma, s/p biopsy on 2025.  Imaging includes PET/CT on 7/3/2025 notable for intense uptake in the cervix and lower uterine segment, with a few indeterminate mildly FDG avid pelvic lymph nodes reported as likely reactive however cannot exclude regional metastases.  No distant disease.  She has not had an MRI as part of her workup.    We discussed multidisciplinary treatment for cervical cancer.  Radical hysterectomy is the Category 1 NCCN recommendation for her stage, however based on her BMI she has been referred for chemoradiation therapy.     We reviewed definitive concurrent chemoRT followed by HDR brachytherapy. This would involve 25 fractions to the pelvis with 4 fractions internally.     The benefits, alternatives and potential adverse effects of radiation therapy were explained to the patient. Risks include but are not limited to fatigue, skin reaction/dermatitis, hyperpigmentation, urinary frequency/urgency, dysuria, diarrhea, abdominal fullness/bloating, a permanent change in bowel habits, vaginal stenosis, cystitis, proctitis, risk of fistulas/adhesions, perforation, infection, laceration, bleeding, risks associated with anesthesia, and risk of secondary malignancy. We also discussed that, even with appropriate therapy, there is still a risk of progression or recurrence.    She agreed to proceed with therapy and informed consents were signed.  MRI has been ordered. CT simulation to follow. Pregnancy test ordered.  Orders:  •  Ambulatory Referral to Radiation Oncology  •  MRI pelvis female wo and w contrast;  Future        History of Present Illness   Chief Complaint   Patient presents with   • Endocervix Cancer     Radiation oncology   Pertinent Medical History   Layla Desai 1970 is a 54 y.o. female Referred by  for malignant neoplasm of the endocervix. She reported vaginal bleeding to her OBGYN, and had  further work up. She presents discuss concurrent whole pelvic radiation and chemotherapy, along with tandem ring brachytherapy.     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  Her mother had breast cancer, and maternal grandmother had uterine cancer.      Endometrium biopsy 2017, was benign.      4/29/25 US PELVIS   IMPRESSION:     1. Hypoechoic vascular lesion in the cervix measuring 3.2 cm, concerning for neoplasm. Correlation with tissue sampling and management per gynecology is recommended.     2. Nonvisualization of the right ovary.     5/5/25 MRI abdomen  IMPRESSION:     No findings of metastatic disease in the abdomen.     Mild splenomegaly.     6/5/25 Tissue Exam  A. Endocervical, ecc:  - Endocervical adenocarcinoma, HPV associated, markedly fragmented.  - P16/Ki-67 multiplex stain supports interpretation.  Tumor is positive with PAX8 and negative with ER.     B. Cervix, 12 oclock:  - Endocervical adenocarcinoma, HPV associated.  - Pan keratin and P16/Ki-67 multiplex stain supports interpretation.      6/25/25   Discussed cervical adenocarcinoma, stage I B2. PET CT ordered. Discussed recommendation of whole pelvic radiation therapy with vaginal brachytherapy and cisplatin chemotherapy for 6 cycles.      7/3/25 PET CT   IMPRESSION:  1. Intense FDG activity associated with known cervical adenocarcinoma.  2. Few FDG avid pelvic lymph nodes including a left common iliac node with SUV max of 2.8 is likely reactive however, cannot exclude regional metastasis. Continued follow-up with PET/CT recommended.  3. No hypermetabolic metastases in the neck,  "chest or upper abdomen.     7/10/25 Dr. Manzo Telemedicine  Counseled regarding possibility of metastatic lymph node involvement, indeterminate.  Biopsy not feasible.  Patient opted to intensify treatment with pembrolizumab.    She feels well overall.  She denies pelvic bleeding or urinary or bowel symptoms.  She is perimenopausal.  She has had previous radiation with plaque to the left eye from April 14, 2016, to April 18, 2016 in [place], records requested.     Oncology History   Cancer Staging   Malignant neoplasm of endocervix (HCC)  Staging form: Cervix Uteri, AJCC Version 9  - Clinical stage from 6/25/2025: FIGO Stage IB2 (cT1b2, cM0) - Signed by Froilan Manzo MD on 6/25/2025  Histopathologic type: Adenocarcinoma, NOS  Stage prefix: Initial diagnosis  Oncology History   Malignant neoplasm of endocervix (HCC)   6/25/2025 Initial Diagnosis    Malignant neoplasm of endocervix ([facility])     6/25/2025 -  Cancer Staged    Staging form: Cervix Uteri, AJCC Version 9  - Clinical stage from 6/25/2025: FIGO Stage IB2 (cT1b2, cM0) - Signed by Froilan Manzo MD on 6/25/2025  Histopathologic type: Adenocarcinoma, NOS  Stage prefix: Initial diagnosis       8/4/2025 -  Chemotherapy    CISplatin (PLATINOL) infusion, 70 mg (original dose 40 mg/m2), 0 of 6 cycles  Dose modification: 70 mg (original dose 40 mg/m2, Cycle 1, Reason: Dose modified as per discussion with consulting physician)        Review of Systems Refer to nursing note.         Objective   /82 (BP Location: Right arm, Patient Position: Sitting)   Pulse 81   Temp (!) 96.8 °F (36 °C) (Temporal)   Resp 18   Ht 5' 6\" (1.676 m)   Wt (!) 148 kg (326 lb)   SpO2 96%   BMI 52.62 kg/m²     Pain Screening:  Pain Score: 0-No pain  ECOG ECOG Performance Status: 0 - Fully active, able to carry on all pre-disease performance without restriction  Physical Exam   General Appearance:  Alert, cooperative, no distress, appears stated age  Cardiovascular:  No " cyanosis  Lungs: Respirations unlabored, able to speak in complete sentences without dyspnea.  Abdomen: Non-distended  Skin: No generalized rash or dermatitis  Neurologic: ANOx3, speech and cognition intact.    Radiology Results: I have reviewed radiology images/reports described above.       Administrative Statements   I have spent a total time of 70 minutes in caring for this patient on the day of the visit/encounter including Diagnostic results, Prognosis, Risks and benefits of tx options, Instructions for management, Patient and family education, Importance of tx compliance, Risk factor reductions, Impressions, Counseling / Coordination of care, Documenting in the medical record, Reviewing/placing orders in the medical record (including tests, medications, and/or procedures), Obtaining or reviewing history  , and Communicating with other healthcare professionals .

## 2025-07-10 ENCOUNTER — TELEPHONE (OUTPATIENT)
Dept: RADIATION ONCOLOGY | Facility: CLINIC | Age: 55
End: 2025-07-10

## 2025-07-10 ENCOUNTER — DOCUMENTATION (OUTPATIENT)
Dept: GYNECOLOGIC ONCOLOGY | Facility: CLINIC | Age: 55
End: 2025-07-10

## 2025-07-10 ENCOUNTER — TELEPHONE (OUTPATIENT)
Dept: GYNECOLOGIC ONCOLOGY | Facility: CLINIC | Age: 55
End: 2025-07-10

## 2025-07-10 DIAGNOSIS — C53.0 MALIGNANT NEOPLASM OF ENDOCERVIX (HCC): Primary | ICD-10-CM

## 2025-07-10 PROCEDURE — 77334 RADIATION TREATMENT AID(S): CPT | Performed by: INTERNAL MEDICINE

## 2025-07-10 NOTE — PROGRESS NOTES
July 2025 Sunday Monday Tuesday Wednesday Thursday Friday Saturday             1     2     3    Nuclear Medicine Procedure   1:00 PM   SH PET 1   [facility] PET Scan 4     5                6     7     8     9    VIRTUAL VISIT   8:15 AM   Froilan Manzo MD   [facility] Gynecology Oncology [place]    CONSULT   2:00 PM   Laly Carr MD   [facility] Radiation Oncology 10     11     12                13     14     15     16     17     18    MR Body   8:15 AM   AN MRI 1   [facility] MRI 19                20     21     22     23    RADON CT SIM  10:00 AM   Laly Carr MD   [facility] Radiation Oncology    OFFICE VISIT   1:00 PM   Froilan Manzo MD   [facility] Gynecology Oncology [place] 24     25     26                27     28     29     30     31 August 2025 Sunday Monday Tuesday Wednesday Thursday Friday Saturday                           LABS 1     2                3     4  CHEMO #1+ PEMBRO  Oncology Treatment   8:00 AM   AL INF CHAIR 7   [facility] 5     6     7     LABS 8     9        Cycle 1, Day 1        10     11  CHEMO #2  Oncology Treatment   8:00 AM   AL INF CHAIR 7   [facility] 12     13     14     LABS 15     16        Cycle 2, Day 1        17     18  CHEMO #3  Oncology Treatment   8:00 AM   AL INF CHAIR 7   [facility] 19     20     21      LABS 22    VIRTUAL VISIT   9:05 AM   DMITRY Lee   [facility] Gyn Oncology [place] 23        Cycle 3, Day 1        24     25  Chemo #4 + Pembro  Oncology Treatment   8:00 AM   AL INF CHAIR 2   [facility] 26     27     28      LABS 29     30        Cycle 4, Day 1        31                                                      Treatment Details         8/4/2025 - Cycle 1, Day 1      Chemotherapy: ONCBCN PROVIDER COMMUNICATION2, CISPLATIN INFUSION, ONCBCN PROVIDER COMMUNICATION, PEMBROLIZUMAB IVPB    8/11/2025 - Cycle 2, Day 1      Chemotherapy: ONCBCN PROVIDER COMMUNICATION2, CISPLATIN INFUSION    8/18/2025 - Cycle 3, Day 1      Chemotherapy: ONCBCN PROVIDER COMMUNICATION2, CISPLATIN INFUSION    8/25/2025 - Cycle 4, Day 1      Chemotherapy: ONCBCN PROVIDER COMMUNICATION2, CISPLATIN INFUSION, ONCBCN PROVIDER COMMUNICATION, PEMBROLIZUMAB IVPB        September 2025 Sunday Monday Tuesday Wednesday Thursday Friday Saturday        1    Labor Day 2  Chemo #5  Oncology Treatment   8:00 AM   AL INF CHAIR 2   [facility] 3     4     LABS 5     6        Cycle 5, Day 1        7     8  Chemo #6  Oncology Treatment   8:00 AM   AL INF CHAIR 10   [facility] 9     10     11      LABS 12     13        Cycle 6, Day 1        14     15  CYCLE #7 PEMBRO  Oncology Treatment   8:00 AM   AL INF CHAIR 10   [facility] 16     17     18      LABS 19     20        Cycle 7, Day 1        21     22    Office Visit  10:30 AM   Froilan Manzo MD   [facility] [place] 23     24     25      LABS 26     27                28     29     30                                           Treatment Details               October 2025 Sunday Monday Tuesday Wednesday Thursday Friday Saturday                  1     2     3     4                5     6     7     8     9     10     11  Happy Birthday!                12     13     14    FOLLOW UP  12:25 PM   Rogelio Villafana MD   [facility] [place] 15    OFFICE VISIT  12:45 PM   DMITRY Hammond   [facility] 16     17     18                19     20     21     22     23     24     25                26     27     28     29     30     31                             Treatment Details         10/6/2025 - Cycle 8, Day 1      Chemotherapy: ONCBCN PROVIDER COMMUNICATION, PEMBROLIZUMAB IVPB    10/27/2025 - Cycle 9, Day 1      Chemotherapy: ONCBCN PROVIDER COMMUNICATION, PEMBROLIZUMAB IVPB

## 2025-07-10 NOTE — TELEPHONE ENCOUNTER
Oncology Teach:   Review of Pre-Treatment Needs:  Lab work frequency reviewed    Expectations at First Appointment Reviewed:  Yes    Patient Medication Education Reviewed:  Yes    Supportive Medication Reviewed:  OTC reviewed, Rx meds reviewed and Confirmed Rx sent to pharmacy    Review when to call office vs. present to ED:  Magnet provided    Provided Oncology Access Center Number:  Yes    Assessment of patient understanding:  Pt demonstrates understanding    Chemo consent obtained?:  Yes     No noted order for mediport.  All chemo teach information and instructions written out along with copy of chemo appointment calendar.  All send via [employer].    PATIENT WILL NEED TO SIGN UPDATED CONSENT UPON ARRIVAL TO INFUSION THE DAY OF TREATMENT

## 2025-07-11 ENCOUNTER — APPOINTMENT (OUTPATIENT)
Dept: LAB | Facility: HOSPITAL | Age: 55
End: 2025-07-11
Payer: COMMERCIAL

## 2025-07-11 DIAGNOSIS — E11.65 TYPE 2 DIABETES MELLITUS WITH HYPERGLYCEMIA, WITH LONG-TERM CURRENT USE OF INSULIN (HCC): ICD-10-CM

## 2025-07-11 DIAGNOSIS — Z79.4 TYPE 2 DIABETES MELLITUS WITH HYPERGLYCEMIA, WITH LONG-TERM CURRENT USE OF INSULIN (HCC): ICD-10-CM

## 2025-07-11 DIAGNOSIS — C53.0 MALIGNANT NEOPLASM OF ENDOCERVIX (HCC): ICD-10-CM

## 2025-07-11 LAB
ALBUMIN SERPL BCG-MCNC: 4.2 G/DL (ref 3.5–5)
ALP SERPL-CCNC: 71 U/L (ref 34–104)
ALT SERPL W P-5'-P-CCNC: 11 U/L (ref 7–52)
ANION GAP SERPL CALCULATED.3IONS-SCNC: 9 MMOL/L (ref 4–13)
AST SERPL W P-5'-P-CCNC: 11 U/L (ref 13–39)
BASOPHILS # BLD AUTO: 0.02 THOUSANDS/ÂΜL (ref 0–0.1)
BASOPHILS NFR BLD AUTO: 0 % (ref 0–1)
BILIRUB SERPL-MCNC: 0.48 MG/DL (ref 0.2–1)
BUN SERPL-MCNC: 15 MG/DL (ref 5–25)
CALCIUM SERPL-MCNC: 9.4 MG/DL (ref 8.4–10.2)
CHLORIDE SERPL-SCNC: 106 MMOL/L (ref 96–108)
CO2 SERPL-SCNC: 24 MMOL/L (ref 21–32)
CREAT SERPL-MCNC: 0.89 MG/DL (ref 0.6–1.3)
EOSINOPHIL # BLD AUTO: 0.23 THOUSAND/ÂΜL (ref 0–0.61)
EOSINOPHIL NFR BLD AUTO: 5 % (ref 0–6)
ERYTHROCYTE [DISTWIDTH] IN BLOOD BY AUTOMATED COUNT: 12.8 % (ref 11.6–15.1)
GFR SERPL CREATININE-BSD FRML MDRD: 74 ML/MIN/1.73SQ M
GLUCOSE P FAST SERPL-MCNC: 197 MG/DL (ref 65–99)
HCG SERPL QL: NEGATIVE
HCT VFR BLD AUTO: 48.7 % (ref 34.8–46.1)
HGB BLD-MCNC: 15.6 G/DL (ref 11.5–15.4)
IMM GRANULOCYTES # BLD AUTO: 0.01 THOUSAND/UL (ref 0–0.2)
IMM GRANULOCYTES NFR BLD AUTO: 0 % (ref 0–2)
LYMPHOCYTES # BLD AUTO: 1.25 THOUSANDS/ÂΜL (ref 0.6–4.47)
LYMPHOCYTES NFR BLD AUTO: 25 % (ref 14–44)
MAGNESIUM SERPL-MCNC: 1.7 MG/DL (ref 1.9–2.7)
MCH RBC QN AUTO: 28.1 PG (ref 26.8–34.3)
MCHC RBC AUTO-ENTMCNC: 32 G/DL (ref 31.4–37.4)
MCV RBC AUTO: 88 FL (ref 82–98)
MONOCYTES # BLD AUTO: 0.48 THOUSAND/ÂΜL (ref 0.17–1.22)
MONOCYTES NFR BLD AUTO: 10 % (ref 4–12)
NEUTROPHILS # BLD AUTO: 2.94 THOUSANDS/ÂΜL (ref 1.85–7.62)
NEUTS SEG NFR BLD AUTO: 60 % (ref 43–75)
NRBC BLD AUTO-RTO: 0 /100 WBCS
PLATELET # BLD AUTO: 195 THOUSANDS/UL (ref 149–390)
PMV BLD AUTO: 9.5 FL (ref 8.9–12.7)
POTASSIUM SERPL-SCNC: 4.1 MMOL/L (ref 3.5–5.3)
PROT SERPL-MCNC: 6.7 G/DL (ref 6.4–8.4)
RBC # BLD AUTO: 5.55 MILLION/UL (ref 3.81–5.12)
SODIUM SERPL-SCNC: 139 MMOL/L (ref 135–147)
WBC # BLD AUTO: 4.93 THOUSAND/UL (ref 4.31–10.16)

## 2025-07-11 PROCEDURE — 85025 COMPLETE CBC W/AUTO DIFF WBC: CPT

## 2025-07-11 PROCEDURE — 83036 HEMOGLOBIN GLYCOSYLATED A1C: CPT

## 2025-07-11 PROCEDURE — 36415 COLL VENOUS BLD VENIPUNCTURE: CPT

## 2025-07-11 PROCEDURE — 80053 COMPREHEN METABOLIC PANEL: CPT

## 2025-07-11 PROCEDURE — 83735 ASSAY OF MAGNESIUM: CPT

## 2025-07-11 PROCEDURE — 84703 CHORIONIC GONADOTROPIN ASSAY: CPT

## 2025-07-12 LAB
EST. AVERAGE GLUCOSE BLD GHB EST-MCNC: 194 MG/DL
HBA1C MFR BLD: 8.4 %

## 2025-07-14 DIAGNOSIS — Z79.4 TYPE 2 DIABETES MELLITUS WITH HYPERGLYCEMIA, WITH LONG-TERM CURRENT USE OF INSULIN (HCC): Primary | ICD-10-CM

## 2025-07-14 DIAGNOSIS — E11.65 TYPE 2 DIABETES MELLITUS WITH HYPERGLYCEMIA, WITH LONG-TERM CURRENT USE OF INSULIN (HCC): Primary | ICD-10-CM

## 2025-07-14 RX ORDER — TIRZEPATIDE 10 MG/.5ML
10 INJECTION, SOLUTION SUBCUTANEOUS
Qty: 2 ML | Refills: 2 | Status: SHIPPED | OUTPATIENT
Start: 2025-07-14

## 2025-07-16 ENCOUNTER — TELEPHONE (OUTPATIENT)
Age: 55
End: 2025-07-16

## 2025-07-16 NOTE — TELEPHONE ENCOUNTER
PA for (Mounjaro) 10 MG SUBMITTED to [employer]    via    [x]CMM-KEY: [id number]  [x]PA sent as URGENT    All office notes, labs and other pertaining documents and studies sent. Clinical questions answered. Awaiting determination from insurance company.     Turnaround time for your insurance to make a decision on your Prior Authorization can take 7-21 business days.

## 2025-07-16 NOTE — TELEPHONE ENCOUNTER
PA for (Mounjaro) 10 MG  APPROVED     Date(s) approved 5/16/25-7/15/26    Case #[id number]    Patient advised by          [x]MyChart Message  []Phone call   []LMOM  []L/M to call office as no active Communication consent on file  []Unable to leave detailed message as VM not approved on Communication consent       Pharmacy advised by    [x]Fax  []Phone call  []Secure Chat    Specialty Pharmacy    []      Approval letter scanned into [employer] Yes

## 2025-07-17 ENCOUNTER — TELEPHONE (OUTPATIENT)
Age: 55
End: 2025-07-17

## 2025-07-18 ENCOUNTER — HOSPITAL ENCOUNTER (OUTPATIENT)
Dept: MRI IMAGING | Facility: HOSPITAL | Age: 55
Discharge: HOME | End: 2025-07-18
Attending: INTERNAL MEDICINE
Payer: COMMERCIAL

## 2025-07-18 DIAGNOSIS — C53.0 MALIGNANT NEOPLASM OF ENDOCERVIX (HCC): ICD-10-CM

## 2025-07-18 PROCEDURE — 72197 MRI PELVIS W/O & W/DYE: CPT

## 2025-07-18 PROCEDURE — A9585 GADOBUTROL INJECTION: HCPCS | Performed by: INTERNAL MEDICINE

## 2025-07-18 RX ORDER — GADOBUTROL 604.72 MG/ML
14 INJECTION INTRAVENOUS
Status: COMPLETED | OUTPATIENT
Start: 2025-07-18 | End: 2025-07-18

## 2025-07-18 RX ADMIN — GADOBUTROL 14 ML: 604.72 INJECTION INTRAVENOUS at 10:15

## 2025-07-18 NOTE — TELEPHONE ENCOUNTER
Attempted to contact the patient to let her know that we are making her 07/23 appointment virtual as requested. Left detailed messed on her phone.

## 2025-07-23 ENCOUNTER — APPOINTMENT (OUTPATIENT)
Dept: RADIATION ONCOLOGY | Facility: HOSPITAL | Age: 55
End: 2025-07-23
Payer: COMMERCIAL

## 2025-07-23 ENCOUNTER — TELEMEDICINE (OUTPATIENT)
Dept: GYNECOLOGIC ONCOLOGY | Facility: CLINIC | Age: 55
End: 2025-07-23
Payer: COMMERCIAL

## 2025-07-23 DIAGNOSIS — T45.1X5A CHEMOTHERAPY-INDUCED NAUSEA: ICD-10-CM

## 2025-07-23 DIAGNOSIS — R11.0 CHEMOTHERAPY-INDUCED NAUSEA: ICD-10-CM

## 2025-07-23 DIAGNOSIS — C53.0 MALIGNANT NEOPLASM OF ENDOCERVIX (HCC): Primary | ICD-10-CM

## 2025-07-23 PROCEDURE — 99213 OFFICE O/P EST LOW 20 MIN: CPT | Performed by: OBSTETRICS & GYNECOLOGY

## 2025-07-23 RX ORDER — LORAZEPAM 1 MG/1
1 TABLET ORAL EVERY 6 HOURS PRN
Qty: 36 TABLET | Refills: 0 | Status: SHIPPED | OUTPATIENT
Start: 2025-07-23

## 2025-07-23 NOTE — ASSESSMENT & PLAN NOTE
Orders:  •  LORazepam (ATIVAN) 1 mg tablet; Take 1 tablet (1 mg total) by mouth every 6 (six) hours as needed for anxiety (or nausea)

## 2025-07-23 NOTE — PROGRESS NOTES
Virtual Regular Visit  Name: Layla Desai      : 1970      MRN: [id number]  Encounter Provider: Froilan Manzo MD  Encounter Date: 2025   Encounter department: [facility] GYNECOLOGY ONCOLOGY [place]  :  Assessment & Plan  Malignant neoplasm of endocervix (HCC)  Patient is a very pleasant 54-year-old female with a history of at least stage I B2 squamous cell carcinoma of the cervix.  There are questionable lymph nodes involved.  When discussing treatment options patient would like to overtreat rather than undertreat.  We have therefore recommended cisplatin 40 mg/m² to a max of 70 mg weekly for 6 weeks as well as pembrolizumab 200 mg IV every 3 weeks throughout the radiation course followed by every 6 weeks for a year afterward based on present protocols.  The patient is comfortable with this.  We discussed risks and benefits at a prior conversation and the patient will sign informed consent when she comes in for treatment.    The patient would like to refill her Ativan as she has been taking this due to anxiety.  Will see her back in approximately 3 to 4 weeks for preop for vaginal brachytherapy.  Orders:    LORazepam (ATIVAN) 1 mg tablet; Take 1 tablet (1 mg total) by mouth every 6 (six) hours as needed for anxiety (or nausea)    Chemotherapy-induced nausea    Orders:    LORazepam (ATIVAN) 1 mg tablet; Take 1 tablet (1 mg total) by mouth every 6 (six) hours as needed for anxiety (or nausea)          History of Present Illness     Patient is a very pleasant 54-year-old female with a history of at least stage I B2 squamous cell carcinoma of the cervix.  She has been recommended chemoradiation therapy due to weight of 320 pounds.  She has undergone an MRI secondary to radiation planning which reveals the following:  FINDINGS:     There is evidence of cervical cancer, which will be characterized based on current FIGO recommendations:  PRIMARY TUMOR:  -Tumor size: Maximum  dimension 4.4 cm (sagittal series 8 image 23 and axial series 9 image 18, correlated with diffusion series 10 images 113 and 114).  -Distance between tumor and internal cervical os: Tumor involves internal cervical os.     -Uterine corpus involvement: Present. Inferior uterine wall.     ADJACENT STRUCTURES:  -Vaginal involvement: None.  -Parametrial invasion: Absent.  -Ureteral involvement: Absent.  -Pelvic sidewall invasion: Absent.  -Sacrouterine ligament invasion: Absent.  -Urinary bladder mucosal invasion: Absent.  -Rectal mucosal invasion: Absent.     REGIONAL LYMPH NODES:  -Suspicious regional pelvic lymph nodes:     1.0 cm short axis left distal external iliac node on series 9 image 18.  Smooth contour without heterogeneity.     1.1 cm short axis distal right external iliac node on series 9 image 23. Smooth contour without heterogeneity.        There is a 9 mm left common iliac node which had focal activity on the prior PET on series 3 image 33, Series 11 image 5.        -Suspicious regional paraaortic nodes (below renal veins): Absent.     NON-REGIONAL LYMPH NODES:  -Suspicious non-regional paraaortic nodes (above renal veins): Absent.  -suspicious non-regional inguinal nodes: No enlarged inguinal lymph nodes within the study field-of-view. The enlarged nodes seen on prior PET may be inferiorly outside the field-of-view.     FIGO STAGE BASED ON MRI: IB3. (Please note for all stage IB3 or higher cervical cancers, PET/CT is advised for more accurate assesment of lymph node metastases and distance disease.)        -ADDITIONAL FINDINGS:     UTERUS FINDINGS:  Arcuate morphology as on series 9 image 16  Junctional zone: Normal thickness.  Myometrium: Tumoral involvement in the lower uterine segment as described above. Exophytic T2 hypointense subserosal 1.9 x 3.0 cm fibroid on series 9 image 18.  Endometrium: Normal thickness without mass.     ADNEXA:  Right:  Ovary normal in size and morphology series 9 image  13.  No suspicious adnexal lesion.  No hydrosalpinx.     Left:  Ovary normal in size and morphology series 9 image 17..  No suspicious adnexal lesion. Benign 8 mm left ovarian follicle.  No hydrosalpinx.     URINARY BLADDER FINDINGS: None.     PELVIC CAVITY FINDINGS: None.     BOWEL FINDINGS: None.     VESSEL FINDINGS: No aneurysm.     PELVIC WALL FINDINGS: None.     BONES: No suspicious osseous lesion.           IMPRESSION:     4.4 cm cervical cancer with invasion into the inferior myometrium.     FIGO STAGE BASED ON MRI: IB3. (Please note for all stage IB3 or higher cervical cancers, PET/CT is advised for more accurate assesment of lymph node metastases and distance disease.)     Tena involvement is better determined on prior PET/CT.  Mildly enlarged external iliac nodes are indeterminate, but suspicious.  A 9 mm left common iliac node, demonstrated focal activity on prior PET.    Her PET/CT scan revealed suspicious but nondiagnostic lymph nodes in the pelvic area consistent with a either reactive changes versus metastatic disease.  I have discussed this with the patient who would prefer to treat it more aggressively as metastatic disease and she has been recommended cis platinum and Keytruda with ongoing tail of treatment for 8-week year after completion of chemoradiation therapy.    Patient now presents for follow-up.  She has not yet started treatment but is due for her planning CT scan tomorrow.  She continues to have difficulties with anxiety and is using the Ativan.  She has no significant pain or bleeding.      Review of Systems   Constitutional: Negative.    HENT: Negative.     Eyes: Negative.    Respiratory: Negative.     Cardiovascular: Negative.    Gastrointestinal: Negative.    Endocrine: Negative.    Genitourinary: Negative.    Musculoskeletal: Negative.    Skin: Negative.    Neurological: Negative.    Hematological: Negative.    Psychiatric/Behavioral:  The patient is nervous/anxious.         Objective   There were no vitals taken for this visit.    Physical Exam  General: Patient appears well-developed. Patient is adequately nourished. Patient is not diaphoretic. Patient is not in distress.  Neck: Visualization of the neck demonstrates no grossly visible masses. Neck mobility is not compromised, neck appears supple.   HEENT: Oral mucosa appears moist. Patient does not identify palpable neck masses. Patient reports no oral tenderness or readily identifiable masses.  Eyes: Conjunctivae appear normal bilaterally. Right eye with no discharge. Left eye with no discharge. No evidence of scleral icterus. No evidence of strabismus.  Respiratory: Respiratory effort appears normal. There is no respiratory distress. Patient able to speak in full sentences. There was no audible stridor or cough.  Abdomen: Patient states her abdomen is soft. States abdomen is non-tender. States abdomen is non-distended. Patient denies visible or palpable bulges to suggest hernias.  Musculoskeletal: Patient reports and I can confirm no visible deformities in 4 extremities. Patient reports and I can confirm full mobility in 4 extremities. There is no grossly visible limb edema. There is no evidence of clubbing or peripheral cyanosis.  Neurologic: Patient is fully alert and responsive. Patient is oriented to time, place and person. Gross evaluation of CNs III-IV-VI-VII-VIII and XI demonstrates no deficits. Patient reports normal gait and balance.  Skin: My evaluation of exposed skin areas reveals no evidence of pallor. My evaluation of exposed skin areas reveals no obvious rashes. My evaluation of exposed skin areas reveals no grossly visible lesions. My evaluation of exposed skin areas reveals no evidence of erythema.  Psychiatric / Behavioral: Patient's mood and affect appears normal. Patient's judgement is preserved. Patient is coherent and thought content appears directionally and contextually appropriate for age and health  status.   Administrative Statements   Encounter provider Froilan Manzo MD    The Patient is located at Home and in the following state in which I hold an active license [place].    The patient was identified by name and date of birth. Layla Desai was informed that this is a telemedicine visit and that the visit is being conducted through the previous 1.  My office door was closed. No one else was in the room.  She acknowledged consent and understanding of privacy and security of the video platform. The patient has agreed to participate and understands they can discontinue the visit at any time.    I have spent a total time of 15 minutes in caring for this patient on the day of the visit/encounter including Diagnostic results, Prognosis, Risks and benefits of tx options, Impressions, Counseling / Coordination of care, Documenting in the medical record, Reviewing/placing orders in the medical record (including tests, medications, and/or procedures), Obtaining or reviewing history  , and Communicating with other healthcare professionals , not including the time spent for establishing the audio/video connection.

## 2025-07-24 ENCOUNTER — TELEPHONE (OUTPATIENT)
Dept: RADIATION ONCOLOGY | Facility: CLINIC | Age: 55
End: 2025-07-24

## 2025-07-24 ENCOUNTER — APPOINTMENT (OUTPATIENT)
Dept: RADIATION ONCOLOGY | Facility: HOSPITAL | Age: 55
End: 2025-07-24
Attending: INTERNAL MEDICINE
Payer: COMMERCIAL

## 2025-07-24 PROCEDURE — 77470 SPECIAL RADIATION TREATMENT: CPT | Performed by: INTERNAL MEDICINE

## 2025-07-24 NOTE — TELEPHONE ENCOUNTER
Radiation Location: [place]  Rad Onc MD:Dr. Laly Carr  Rad Course (# of TX's & dose): 25 treatments 55 Gy dose  Sim Date: 7/24/25  First Day of Rad: 8/4/25    Does chemo/RT need to start on same day: yes  What day of the week does chemo/rt need to begin:Monday    Special Consideration:  This patient was scheduled for RT 8/4/25 after her chemotherapy.     This patient was enrolled in Concurrent Chemo and Radiation Therapy.

## 2025-07-28 ENCOUNTER — APPOINTMENT (OUTPATIENT)
Dept: LAB | Facility: HOSPITAL | Age: 55
End: 2025-07-28
Payer: COMMERCIAL

## 2025-07-28 DIAGNOSIS — C53.0 MALIGNANT NEOPLASM OF ENDOCERVIX (HCC): ICD-10-CM

## 2025-07-28 LAB
BASOPHILS # BLD AUTO: 0.03 THOUSANDS/ÂΜL (ref 0–0.1)
BASOPHILS NFR BLD AUTO: 1 % (ref 0–1)
EOSINOPHIL # BLD AUTO: 0.21 THOUSAND/ÂΜL (ref 0–0.61)
EOSINOPHIL NFR BLD AUTO: 5 % (ref 0–6)
ERYTHROCYTE [DISTWIDTH] IN BLOOD BY AUTOMATED COUNT: 13.1 % (ref 11.6–15.1)
HCT VFR BLD AUTO: 50.7 % (ref 34.8–46.1)
HGB BLD-MCNC: 16.1 G/DL (ref 11.5–15.4)
IMM GRANULOCYTES # BLD AUTO: 0.01 THOUSAND/UL (ref 0–0.2)
IMM GRANULOCYTES NFR BLD AUTO: 0 % (ref 0–2)
LYMPHOCYTES # BLD AUTO: 1.15 THOUSANDS/ÂΜL (ref 0.6–4.47)
LYMPHOCYTES NFR BLD AUTO: 26 % (ref 14–44)
MCH RBC QN AUTO: 27.7 PG (ref 26.8–34.3)
MCHC RBC AUTO-ENTMCNC: 31.8 G/DL (ref 31.4–37.4)
MCV RBC AUTO: 87 FL (ref 82–98)
MONOCYTES # BLD AUTO: 0.42 THOUSAND/ÂΜL (ref 0.17–1.22)
MONOCYTES NFR BLD AUTO: 9 % (ref 4–12)
NEUTROPHILS # BLD AUTO: 2.65 THOUSANDS/ÂΜL (ref 1.85–7.62)
NEUTS SEG NFR BLD AUTO: 59 % (ref 43–75)
NRBC BLD AUTO-RTO: 0 /100 WBCS
PLATELET # BLD AUTO: 183 THOUSANDS/UL (ref 149–390)
PMV BLD AUTO: 8.8 FL (ref 8.9–12.7)
RBC # BLD AUTO: 5.82 MILLION/UL (ref 3.81–5.12)
WBC # BLD AUTO: 4.47 THOUSAND/UL (ref 4.31–10.16)

## 2025-07-28 PROCEDURE — 85025 COMPLETE CBC W/AUTO DIFF WBC: CPT

## 2025-07-28 PROCEDURE — 36415 COLL VENOUS BLD VENIPUNCTURE: CPT

## 2025-08-01 ENCOUNTER — APPOINTMENT (OUTPATIENT)
Dept: LAB | Facility: HOSPITAL | Age: 55
End: 2025-08-01
Payer: COMMERCIAL

## 2025-08-01 DIAGNOSIS — E83.42 HYPOMAGNESEMIA: Primary | ICD-10-CM

## 2025-08-01 DIAGNOSIS — C53.0 MALIGNANT NEOPLASM OF ENDOCERVIX (HCC): ICD-10-CM

## 2025-08-01 DIAGNOSIS — R68.89 OTHER GENERAL SYMPTOMS AND SIGNS: ICD-10-CM

## 2025-08-01 DIAGNOSIS — C53.0 MALIGNANT NEOPLASM OF ENDOCERVIX (HCC): Primary | ICD-10-CM

## 2025-08-01 LAB
ALBUMIN SERPL BCG-MCNC: 4 G/DL (ref 3.5–5)
ALP SERPL-CCNC: 65 U/L (ref 34–104)
ALT SERPL W P-5'-P-CCNC: 9 U/L (ref 7–52)
ANION GAP SERPL CALCULATED.3IONS-SCNC: 9 MMOL/L (ref 4–13)
AST SERPL W P-5'-P-CCNC: 11 U/L (ref 13–39)
BASOPHILS # BLD AUTO: 0.03 THOUSANDS/ÂΜL (ref 0–0.1)
BASOPHILS NFR BLD AUTO: 1 % (ref 0–1)
BILIRUB SERPL-MCNC: 0.55 MG/DL (ref 0.2–1)
BUN SERPL-MCNC: 13 MG/DL (ref 5–25)
CALCIUM SERPL-MCNC: 8.9 MG/DL (ref 8.4–10.2)
CHLORIDE SERPL-SCNC: 105 MMOL/L (ref 96–108)
CO2 SERPL-SCNC: 23 MMOL/L (ref 21–32)
CREAT SERPL-MCNC: 1.03 MG/DL (ref 0.6–1.3)
EOSINOPHIL # BLD AUTO: 0.21 THOUSAND/ÂΜL (ref 0–0.61)
EOSINOPHIL NFR BLD AUTO: 4 % (ref 0–6)
ERYTHROCYTE [DISTWIDTH] IN BLOOD BY AUTOMATED COUNT: 13 % (ref 11.6–15.1)
GFR SERPL CREATININE-BSD FRML MDRD: 62 ML/MIN/1.73SQ M
GLUCOSE P FAST SERPL-MCNC: 209 MG/DL (ref 65–99)
HCT VFR BLD AUTO: 47.7 % (ref 34.8–46.1)
HGB BLD-MCNC: 15.7 G/DL (ref 11.5–15.4)
IMM GRANULOCYTES # BLD AUTO: 0.01 THOUSAND/UL (ref 0–0.2)
IMM GRANULOCYTES NFR BLD AUTO: 0 % (ref 0–2)
LYMPHOCYTES # BLD AUTO: 1.15 THOUSANDS/ÂΜL (ref 0.6–4.47)
LYMPHOCYTES NFR BLD AUTO: 24 % (ref 14–44)
MAGNESIUM SERPL-MCNC: 1.8 MG/DL (ref 1.9–2.7)
MCH RBC QN AUTO: 28.8 PG (ref 26.8–34.3)
MCHC RBC AUTO-ENTMCNC: 32.9 G/DL (ref 31.4–37.4)
MCV RBC AUTO: 88 FL (ref 82–98)
MONOCYTES # BLD AUTO: 0.4 THOUSAND/ÂΜL (ref 0.17–1.22)
MONOCYTES NFR BLD AUTO: 8 % (ref 4–12)
NEUTROPHILS # BLD AUTO: 2.97 THOUSANDS/ÂΜL (ref 1.85–7.62)
NEUTS SEG NFR BLD AUTO: 63 % (ref 43–75)
NRBC BLD AUTO-RTO: 0 /100 WBCS
PLATELET # BLD AUTO: 180 THOUSANDS/UL (ref 149–390)
PMV BLD AUTO: 9.2 FL (ref 8.9–12.7)
POTASSIUM SERPL-SCNC: 4.2 MMOL/L (ref 3.5–5.3)
PROT SERPL-MCNC: 6.7 G/DL (ref 6.4–8.4)
RBC # BLD AUTO: 5.45 MILLION/UL (ref 3.81–5.12)
SODIUM SERPL-SCNC: 137 MMOL/L (ref 135–147)
WBC # BLD AUTO: 4.77 THOUSAND/UL (ref 4.31–10.16)

## 2025-08-01 PROCEDURE — 85025 COMPLETE CBC W/AUTO DIFF WBC: CPT

## 2025-08-01 PROCEDURE — 83735 ASSAY OF MAGNESIUM: CPT

## 2025-08-01 PROCEDURE — 36415 COLL VENOUS BLD VENIPUNCTURE: CPT

## 2025-08-01 PROCEDURE — 80053 COMPREHEN METABOLIC PANEL: CPT

## 2025-08-01 RX ORDER — MAGNESIUM SULFATE HEPTAHYDRATE 40 MG/ML
2 INJECTION, SOLUTION INTRAVENOUS ONCE
Status: CANCELLED | OUTPATIENT
Start: 2025-08-04

## 2025-08-01 RX ORDER — PALONOSETRON 0.05 MG/ML
0.25 INJECTION, SOLUTION INTRAVENOUS ONCE
Status: CANCELLED | OUTPATIENT
Start: 2025-08-04

## 2025-08-01 RX ORDER — SODIUM CHLORIDE 9 MG/ML
20 INJECTION, SOLUTION INTRAVENOUS ONCE
Status: CANCELLED | OUTPATIENT
Start: 2025-08-04

## 2025-08-04 ENCOUNTER — HOSPITAL ENCOUNTER (OUTPATIENT)
Dept: INFUSION CENTER | Facility: CLINIC | Age: 55
Discharge: HOME | End: 2025-08-04
Attending: OBSTETRICS & GYNECOLOGY
Payer: COMMERCIAL

## 2025-08-04 VITALS
SYSTOLIC BLOOD PRESSURE: 139 MMHG | WEIGHT: 293 LBS | DIASTOLIC BLOOD PRESSURE: 78 MMHG | RESPIRATION RATE: 18 BRPM | HEIGHT: 65 IN | TEMPERATURE: 97.4 F | BODY MASS INDEX: 48.82 KG/M2 | HEART RATE: 82 BPM

## 2025-08-04 DIAGNOSIS — C53.0 MALIGNANT NEOPLASM OF ENDOCERVIX (HCC): ICD-10-CM

## 2025-08-04 DIAGNOSIS — E83.42 HYPOMAGNESEMIA: Primary | ICD-10-CM

## 2025-08-04 DIAGNOSIS — C53.0 MALIGNANT NEOPLASM OF ENDOCERVIX (HCC): Primary | ICD-10-CM

## 2025-08-04 LAB
AMYLASE SERPL-CCNC: 30 IU/L (ref 29–103)
LIPASE SERPL-CCNC: 29 U/L (ref 11–82)
RAD ONC ARIA COURSE FIRST TREATMENT DATE: NORMAL
RAD ONC ARIA COURSE ID: NORMAL
RAD ONC ARIA COURSE INTENT: NORMAL
RAD ONC ARIA COURSE LAST TREATMENT DATE: NORMAL
RAD ONC ARIA COURSE SESSION NUMBER: 1
RAD ONC ARIA COURSE START DATE: NORMAL
RAD ONC ARIA COURSE TREATMENT ELAPSED DAYS: 0
RAD ONC ARIA PLAN FRACTIONS TREATED TO DATE: 1
RAD ONC ARIA PLAN ID: NORMAL
RAD ONC ARIA PLAN NAME: NORMAL
RAD ONC ARIA PLAN PRESCRIBED DOSE PER FRACTION: 2.2 GY
RAD ONC ARIA PLAN PRIMARY REFERENCE POINT: NORMAL
RAD ONC ARIA PLAN TOTAL FRACTIONS PRESCRIBED: 25
RAD ONC ARIA PLAN TOTAL PRESCRIBED DOSE: 5500 CGY
RAD ONC ARIA REFERENCE POINT DOSAGE GIVEN TO DATE: 2.2 GY
RAD ONC ARIA REFERENCE POINT ID: NORMAL
RAD ONC ARIA REFERENCE POINT SESSION DOSAGE GIVEN: 2.2 GY
TSH SERPL DL<=0.05 MIU/L-ACNC: 2.85 UIU/ML (ref 0.45–4.5)

## 2025-08-04 PROCEDURE — 84443 ASSAY THYROID STIM HORMONE: CPT | Performed by: OBSTETRICS & GYNECOLOGY

## 2025-08-04 PROCEDURE — 82150 ASSAY OF AMYLASE: CPT | Performed by: OBSTETRICS & GYNECOLOGY

## 2025-08-04 PROCEDURE — 83690 ASSAY OF LIPASE: CPT | Performed by: OBSTETRICS & GYNECOLOGY

## 2025-08-04 PROCEDURE — 96417 CHEMO IV INFUS EACH ADDL SEQ: CPT

## 2025-08-04 PROCEDURE — 96367 TX/PROPH/DG ADDL SEQ IV INF: CPT

## 2025-08-04 PROCEDURE — 96413 CHEMO IV INFUSION 1 HR: CPT

## 2025-08-04 PROCEDURE — 96375 TX/PRO/DX INJ NEW DRUG ADDON: CPT

## 2025-08-04 RX ORDER — SODIUM CHLORIDE 9 MG/ML
20 INJECTION, SOLUTION INTRAVENOUS ONCE
Status: COMPLETED | OUTPATIENT
Start: 2025-08-04 | End: 2025-08-04

## 2025-08-04 RX ORDER — MAGNESIUM SULFATE HEPTAHYDRATE 40 MG/ML
2 INJECTION, SOLUTION INTRAVENOUS ONCE
Status: COMPLETED | OUTPATIENT
Start: 2025-08-04 | End: 2025-08-04

## 2025-08-04 RX ORDER — PALONOSETRON 0.05 MG/ML
0.25 INJECTION, SOLUTION INTRAVENOUS ONCE
Status: COMPLETED | OUTPATIENT
Start: 2025-08-04 | End: 2025-08-04

## 2025-08-04 RX ADMIN — SODIUM CHLORIDE 500 ML: 0.9 INJECTION, SOLUTION INTRAVENOUS at 10:20

## 2025-08-04 RX ADMIN — FOSAPREPITANT 150 MG: 150 INJECTION, POWDER, LYOPHILIZED, FOR SOLUTION INTRAVENOUS at 10:46

## 2025-08-04 RX ADMIN — PALONOSETRON HYDROCHLORIDE 0.25 MG: 0.25 INJECTION INTRAVENOUS at 09:08

## 2025-08-04 RX ADMIN — SODIUM CHLORIDE 20 ML/HR: 0.9 INJECTION, SOLUTION INTRAVENOUS at 09:01

## 2025-08-04 RX ADMIN — SODIUM CHLORIDE 500 ML: 0.9 INJECTION, SOLUTION INTRAVENOUS at 12:34

## 2025-08-04 RX ADMIN — FAMOTIDINE 20 MG: 10 INJECTION, SOLUTION INTRAVENOUS at 09:07

## 2025-08-04 RX ADMIN — DEXAMETHASONE SODIUM PHOSPHATE 20 MG: 10 INJECTION, SOLUTION INTRAMUSCULAR; INTRAVENOUS at 10:21

## 2025-08-04 RX ADMIN — MAGNESIUM SULFATE HEPTAHYDRATE 2 G: 40 INJECTION, SOLUTION INTRAVENOUS at 09:08

## 2025-08-04 RX ADMIN — SODIUM CHLORIDE 200 MG: 9 INJECTION, SOLUTION INTRAVENOUS at 12:34

## 2025-08-04 RX ADMIN — SODIUM CHLORIDE 70 MG: 0.9 INJECTION, SOLUTION INTRAVENOUS at 11:31

## 2025-08-05 DIAGNOSIS — C53.9 MALIGNANT NEOPLASM OF CERVIX, UNSPECIFIED SITE (HCC): Primary | ICD-10-CM

## 2025-08-05 LAB
RAD ONC ARIA COURSE FIRST TREATMENT DATE: NORMAL
RAD ONC ARIA COURSE ID: NORMAL
RAD ONC ARIA COURSE INTENT: NORMAL
RAD ONC ARIA COURSE LAST TREATMENT DATE: NORMAL
RAD ONC ARIA COURSE SESSION NUMBER: 2
RAD ONC ARIA COURSE START DATE: NORMAL
RAD ONC ARIA COURSE TREATMENT ELAPSED DAYS: 1
RAD ONC ARIA PLAN FRACTIONS TREATED TO DATE: 2
RAD ONC ARIA PLAN ID: NORMAL
RAD ONC ARIA PLAN NAME: NORMAL
RAD ONC ARIA PLAN PRESCRIBED DOSE PER FRACTION: 2.2 GY
RAD ONC ARIA PLAN PRIMARY REFERENCE POINT: NORMAL
RAD ONC ARIA PLAN TOTAL FRACTIONS PRESCRIBED: 25
RAD ONC ARIA PLAN TOTAL PRESCRIBED DOSE: 5500 CGY
RAD ONC ARIA REFERENCE POINT DOSAGE GIVEN TO DATE: 4.4 GY
RAD ONC ARIA REFERENCE POINT ID: NORMAL
RAD ONC ARIA REFERENCE POINT SESSION DOSAGE GIVEN: 2.2 GY

## 2025-08-06 LAB
RAD ONC ARIA COURSE FIRST TREATMENT DATE: NORMAL
RAD ONC ARIA COURSE ID: NORMAL
RAD ONC ARIA COURSE INTENT: NORMAL
RAD ONC ARIA COURSE LAST TREATMENT DATE: NORMAL
RAD ONC ARIA COURSE SESSION NUMBER: 3
RAD ONC ARIA COURSE START DATE: NORMAL
RAD ONC ARIA COURSE TREATMENT ELAPSED DAYS: 2
RAD ONC ARIA PLAN FRACTIONS TREATED TO DATE: 3
RAD ONC ARIA PLAN ID: NORMAL
RAD ONC ARIA PLAN NAME: NORMAL
RAD ONC ARIA PLAN PRESCRIBED DOSE PER FRACTION: 2.2 GY
RAD ONC ARIA PLAN PRIMARY REFERENCE POINT: NORMAL
RAD ONC ARIA PLAN TOTAL FRACTIONS PRESCRIBED: 25
RAD ONC ARIA PLAN TOTAL PRESCRIBED DOSE: 5500 CGY
RAD ONC ARIA REFERENCE POINT DOSAGE GIVEN TO DATE: 6.6 GY
RAD ONC ARIA REFERENCE POINT ID: NORMAL
RAD ONC ARIA REFERENCE POINT SESSION DOSAGE GIVEN: 2.2 GY

## 2025-08-07 DIAGNOSIS — J45.30 MILD PERSISTENT ASTHMA WITHOUT COMPLICATION: Chronic | ICD-10-CM

## 2025-08-07 LAB
RAD ONC ARIA COURSE FIRST TREATMENT DATE: NORMAL
RAD ONC ARIA COURSE ID: NORMAL
RAD ONC ARIA COURSE INTENT: NORMAL
RAD ONC ARIA COURSE LAST TREATMENT DATE: NORMAL
RAD ONC ARIA COURSE SESSION NUMBER: 4
RAD ONC ARIA COURSE START DATE: NORMAL
RAD ONC ARIA COURSE TREATMENT ELAPSED DAYS: 3
RAD ONC ARIA PLAN FRACTIONS TREATED TO DATE: 4
RAD ONC ARIA PLAN ID: NORMAL
RAD ONC ARIA PLAN NAME: NORMAL
RAD ONC ARIA PLAN PRESCRIBED DOSE PER FRACTION: 2.2 GY
RAD ONC ARIA PLAN PRIMARY REFERENCE POINT: NORMAL
RAD ONC ARIA PLAN TOTAL FRACTIONS PRESCRIBED: 25
RAD ONC ARIA PLAN TOTAL PRESCRIBED DOSE: 5500 CGY
RAD ONC ARIA REFERENCE POINT DOSAGE GIVEN TO DATE: 8.8 GY
RAD ONC ARIA REFERENCE POINT ID: NORMAL
RAD ONC ARIA REFERENCE POINT SESSION DOSAGE GIVEN: 2.2 GY

## 2025-08-08 ENCOUNTER — APPOINTMENT (OUTPATIENT)
Dept: LAB | Facility: HOSPITAL | Age: 55
End: 2025-08-08
Payer: COMMERCIAL

## 2025-08-08 LAB
RAD ONC ARIA COURSE FIRST TREATMENT DATE: NORMAL
RAD ONC ARIA COURSE ID: NORMAL
RAD ONC ARIA COURSE INTENT: NORMAL
RAD ONC ARIA COURSE LAST TREATMENT DATE: NORMAL
RAD ONC ARIA COURSE SESSION NUMBER: 5
RAD ONC ARIA COURSE START DATE: NORMAL
RAD ONC ARIA COURSE TREATMENT ELAPSED DAYS: 4
RAD ONC ARIA PLAN FRACTIONS TREATED TO DATE: 5
RAD ONC ARIA PLAN ID: NORMAL
RAD ONC ARIA PLAN NAME: NORMAL
RAD ONC ARIA PLAN PRESCRIBED DOSE PER FRACTION: 2.2 GY
RAD ONC ARIA PLAN PRIMARY REFERENCE POINT: NORMAL
RAD ONC ARIA PLAN TOTAL FRACTIONS PRESCRIBED: 25
RAD ONC ARIA PLAN TOTAL PRESCRIBED DOSE: 5500 CGY
RAD ONC ARIA REFERENCE POINT DOSAGE GIVEN TO DATE: 11 GY
RAD ONC ARIA REFERENCE POINT ID: NORMAL
RAD ONC ARIA REFERENCE POINT SESSION DOSAGE GIVEN: 2.2 GY

## 2025-08-08 RX ORDER — MAGNESIUM SULFATE HEPTAHYDRATE 40 MG/ML
2 INJECTION, SOLUTION INTRAVENOUS ONCE
Status: CANCELLED | OUTPATIENT
Start: 2025-08-11

## 2025-08-08 RX ORDER — PALONOSETRON 0.05 MG/ML
0.25 INJECTION, SOLUTION INTRAVENOUS ONCE
Status: CANCELLED | OUTPATIENT
Start: 2025-08-11

## 2025-08-08 RX ORDER — ALBUTEROL SULFATE 90 UG/1
2 INHALANT RESPIRATORY (INHALATION) EVERY 6 HOURS PRN
Qty: 27 G | Refills: 1 | Status: SHIPPED | OUTPATIENT
Start: 2025-08-08

## 2025-08-08 RX ORDER — SODIUM CHLORIDE 9 MG/ML
20 INJECTION, SOLUTION INTRAVENOUS ONCE
Status: CANCELLED | OUTPATIENT
Start: 2025-08-11

## 2025-08-11 ENCOUNTER — HOSPITAL ENCOUNTER (OUTPATIENT)
Dept: INFUSION CENTER | Facility: CLINIC | Age: 55
Discharge: HOME | End: 2025-08-11
Attending: OBSTETRICS & GYNECOLOGY
Payer: COMMERCIAL

## 2025-08-15 ENCOUNTER — APPOINTMENT (OUTPATIENT)
Dept: LAB | Facility: HOSPITAL | Age: 55
End: 2025-08-15
Payer: COMMERCIAL

## 2025-08-18 ENCOUNTER — HOSPITAL ENCOUNTER (OUTPATIENT)
Dept: INFUSION CENTER | Facility: CLINIC | Age: 55
Discharge: HOME | End: 2025-08-18
Attending: OBSTETRICS & GYNECOLOGY
Payer: COMMERCIAL

## 2025-08-18 VITALS
WEIGHT: 293 LBS | RESPIRATION RATE: 18 BRPM | TEMPERATURE: 97.6 F | SYSTOLIC BLOOD PRESSURE: 114 MMHG | DIASTOLIC BLOOD PRESSURE: 74 MMHG | BODY MASS INDEX: 48.82 KG/M2 | HEART RATE: 91 BPM | HEIGHT: 65 IN

## 2025-08-18 DIAGNOSIS — E83.42 HYPOMAGNESEMIA: Primary | ICD-10-CM

## 2025-08-18 DIAGNOSIS — C53.0 MALIGNANT NEOPLASM OF ENDOCERVIX (HCC): ICD-10-CM

## 2025-08-18 LAB
RAD ONC ARIA COURSE FIRST TREATMENT DATE: NORMAL
RAD ONC ARIA COURSE ID: NORMAL
RAD ONC ARIA COURSE INTENT: NORMAL
RAD ONC ARIA COURSE LAST TREATMENT DATE: NORMAL
RAD ONC ARIA COURSE SESSION NUMBER: 10
RAD ONC ARIA COURSE START DATE: NORMAL
RAD ONC ARIA COURSE TREATMENT ELAPSED DAYS: 14
RAD ONC ARIA PLAN FRACTIONS TREATED TO DATE: 10
RAD ONC ARIA PLAN ID: NORMAL
RAD ONC ARIA PLAN NAME: NORMAL
RAD ONC ARIA PLAN PRESCRIBED DOSE PER FRACTION: 2.2 GY
RAD ONC ARIA PLAN PRIMARY REFERENCE POINT: NORMAL
RAD ONC ARIA PLAN TOTAL FRACTIONS PRESCRIBED: 25
RAD ONC ARIA PLAN TOTAL PRESCRIBED DOSE: 5500 CGY
RAD ONC ARIA REFERENCE POINT DOSAGE GIVEN TO DATE: 22 GY
RAD ONC ARIA REFERENCE POINT ID: NORMAL
RAD ONC ARIA REFERENCE POINT SESSION DOSAGE GIVEN: 2.2 GY

## 2025-08-18 PROCEDURE — 96375 TX/PRO/DX INJ NEW DRUG ADDON: CPT

## 2025-08-18 PROCEDURE — 96366 THER/PROPH/DIAG IV INF ADDON: CPT

## 2025-08-18 PROCEDURE — 96413 CHEMO IV INFUSION 1 HR: CPT

## 2025-08-18 PROCEDURE — 96367 TX/PROPH/DG ADDL SEQ IV INF: CPT

## 2025-08-18 RX ORDER — SODIUM CHLORIDE 9 MG/ML
20 INJECTION, SOLUTION INTRAVENOUS ONCE
Status: COMPLETED | OUTPATIENT
Start: 2025-08-18 | End: 2025-08-18

## 2025-08-18 RX ORDER — MAGNESIUM SULFATE HEPTAHYDRATE 40 MG/ML
2 INJECTION, SOLUTION INTRAVENOUS ONCE
Status: CANCELLED | OUTPATIENT
Start: 2025-08-18

## 2025-08-18 RX ORDER — PALONOSETRON 0.05 MG/ML
0.25 INJECTION, SOLUTION INTRAVENOUS ONCE
Status: COMPLETED | OUTPATIENT
Start: 2025-08-18 | End: 2025-08-18

## 2025-08-18 RX ORDER — PALONOSETRON 0.05 MG/ML
0.25 INJECTION, SOLUTION INTRAVENOUS ONCE
Status: CANCELLED | OUTPATIENT
Start: 2025-08-18

## 2025-08-18 RX ORDER — MAGNESIUM SULFATE HEPTAHYDRATE 40 MG/ML
2 INJECTION, SOLUTION INTRAVENOUS ONCE
Status: DISCONTINUED | OUTPATIENT
Start: 2025-08-18 | End: 2025-08-18 | Stop reason: CLARIF

## 2025-08-18 RX ORDER — SODIUM CHLORIDE 9 MG/ML
20 INJECTION, SOLUTION INTRAVENOUS ONCE
Status: CANCELLED | OUTPATIENT
Start: 2025-08-18

## 2025-08-18 RX ADMIN — SODIUM CHLORIDE 20 ML/HR: 0.9 INJECTION, SOLUTION INTRAVENOUS at 09:25

## 2025-08-18 RX ADMIN — PALONOSETRON HYDROCHLORIDE 0.25 MG: 0.25 INJECTION INTRAVENOUS at 09:29

## 2025-08-18 RX ADMIN — FOSAPREPITANT 150 MG: 150 INJECTION, POWDER, LYOPHILIZED, FOR SOLUTION INTRAVENOUS at 10:00

## 2025-08-18 RX ADMIN — MAGNESIUM SULFATE HEPTAHYDRATE: 500 INJECTION, SOLUTION INTRAMUSCULAR; INTRAVENOUS at 12:43

## 2025-08-18 RX ADMIN — CISPLATIN 70 MG: 1 INJECTION, SOLUTION INTRAVENOUS at 11:42

## 2025-08-18 RX ADMIN — FAMOTIDINE 20 MG: 10 INJECTION, SOLUTION INTRAVENOUS at 10:34

## 2025-08-18 RX ADMIN — DEXAMETHASONE SODIUM PHOSPHATE 20 MG: 10 INJECTION, SOLUTION INTRAMUSCULAR; INTRAVENOUS at 09:29

## 2025-08-18 RX ADMIN — MAGNESIUM SULFATE HEPTAHYDRATE: 500 INJECTION, SOLUTION INTRAMUSCULAR; INTRAVENOUS at 10:35

## 2025-08-19 LAB
RAD ONC ARIA COURSE FIRST TREATMENT DATE: NORMAL
RAD ONC ARIA COURSE ID: NORMAL
RAD ONC ARIA COURSE INTENT: NORMAL
RAD ONC ARIA COURSE LAST TREATMENT DATE: NORMAL
RAD ONC ARIA COURSE SESSION NUMBER: 11
RAD ONC ARIA COURSE START DATE: NORMAL
RAD ONC ARIA COURSE TREATMENT ELAPSED DAYS: 15
RAD ONC ARIA PLAN FRACTIONS TREATED TO DATE: 11
RAD ONC ARIA PLAN ID: NORMAL
RAD ONC ARIA PLAN NAME: NORMAL
RAD ONC ARIA PLAN PRESCRIBED DOSE PER FRACTION: 2.2 GY
RAD ONC ARIA PLAN PRIMARY REFERENCE POINT: NORMAL
RAD ONC ARIA PLAN TOTAL FRACTIONS PRESCRIBED: 25
RAD ONC ARIA PLAN TOTAL PRESCRIBED DOSE: 5500 CGY
RAD ONC ARIA REFERENCE POINT DOSAGE GIVEN TO DATE: 24.2 GY
RAD ONC ARIA REFERENCE POINT ID: NORMAL
RAD ONC ARIA REFERENCE POINT SESSION DOSAGE GIVEN: 2.2 GY

## 2025-08-20 LAB
RAD ONC ARIA COURSE FIRST TREATMENT DATE: NORMAL
RAD ONC ARIA COURSE ID: NORMAL
RAD ONC ARIA COURSE INTENT: NORMAL
RAD ONC ARIA COURSE LAST TREATMENT DATE: NORMAL
RAD ONC ARIA COURSE SESSION NUMBER: 12
RAD ONC ARIA COURSE START DATE: NORMAL
RAD ONC ARIA COURSE TREATMENT ELAPSED DAYS: 16
RAD ONC ARIA PLAN FRACTIONS TREATED TO DATE: 12
RAD ONC ARIA PLAN ID: NORMAL
RAD ONC ARIA PLAN NAME: NORMAL
RAD ONC ARIA PLAN PRESCRIBED DOSE PER FRACTION: 2.2 GY
RAD ONC ARIA PLAN PRIMARY REFERENCE POINT: NORMAL
RAD ONC ARIA PLAN TOTAL FRACTIONS PRESCRIBED: 25
RAD ONC ARIA PLAN TOTAL PRESCRIBED DOSE: 5500 CGY
RAD ONC ARIA REFERENCE POINT DOSAGE GIVEN TO DATE: 26.4 GY
RAD ONC ARIA REFERENCE POINT ID: NORMAL
RAD ONC ARIA REFERENCE POINT SESSION DOSAGE GIVEN: 2.2 GY

## 2025-08-21 ENCOUNTER — PATIENT OUTREACH (OUTPATIENT)
Dept: HEMATOLOGY ONCOLOGY | Facility: CLINIC | Age: 55
End: 2025-08-21

## 2025-08-21 LAB
RAD ONC ARIA COURSE FIRST TREATMENT DATE: NORMAL
RAD ONC ARIA COURSE ID: NORMAL
RAD ONC ARIA COURSE INTENT: NORMAL
RAD ONC ARIA COURSE LAST TREATMENT DATE: NORMAL
RAD ONC ARIA COURSE SESSION NUMBER: 13
RAD ONC ARIA COURSE START DATE: NORMAL
RAD ONC ARIA COURSE TREATMENT ELAPSED DAYS: 17
RAD ONC ARIA PLAN FRACTIONS TREATED TO DATE: 13
RAD ONC ARIA PLAN ID: NORMAL
RAD ONC ARIA PLAN NAME: NORMAL
RAD ONC ARIA PLAN PRESCRIBED DOSE PER FRACTION: 2.2 GY
RAD ONC ARIA PLAN PRIMARY REFERENCE POINT: NORMAL
RAD ONC ARIA PLAN TOTAL FRACTIONS PRESCRIBED: 25
RAD ONC ARIA PLAN TOTAL PRESCRIBED DOSE: 5500 CGY
RAD ONC ARIA REFERENCE POINT DOSAGE GIVEN TO DATE: 28.6 GY
RAD ONC ARIA REFERENCE POINT ID: NORMAL
RAD ONC ARIA REFERENCE POINT SESSION DOSAGE GIVEN: 2.2 GY

## 2025-08-22 LAB
RAD ONC ARIA COURSE FIRST TREATMENT DATE: NORMAL
RAD ONC ARIA COURSE ID: NORMAL
RAD ONC ARIA COURSE INTENT: NORMAL
RAD ONC ARIA COURSE LAST TREATMENT DATE: NORMAL
RAD ONC ARIA COURSE SESSION NUMBER: 14
RAD ONC ARIA COURSE START DATE: NORMAL
RAD ONC ARIA COURSE TREATMENT ELAPSED DAYS: 18
RAD ONC ARIA PLAN FRACTIONS TREATED TO DATE: 14
RAD ONC ARIA PLAN ID: NORMAL
RAD ONC ARIA PLAN NAME: NORMAL
RAD ONC ARIA PLAN PRESCRIBED DOSE PER FRACTION: 2.2 GY
RAD ONC ARIA PLAN PRIMARY REFERENCE POINT: NORMAL
RAD ONC ARIA PLAN TOTAL FRACTIONS PRESCRIBED: 25
RAD ONC ARIA PLAN TOTAL PRESCRIBED DOSE: 5500 CGY
RAD ONC ARIA REFERENCE POINT DOSAGE GIVEN TO DATE: 30.8 GY
RAD ONC ARIA REFERENCE POINT ID: NORMAL
RAD ONC ARIA REFERENCE POINT SESSION DOSAGE GIVEN: 2.2 GY